# Patient Record
Sex: FEMALE | Race: BLACK OR AFRICAN AMERICAN | Employment: PART TIME | URBAN - METROPOLITAN AREA
[De-identification: names, ages, dates, MRNs, and addresses within clinical notes are randomized per-mention and may not be internally consistent; named-entity substitution may affect disease eponyms.]

---

## 2021-02-23 ENCOUNTER — OFFICE VISIT (OUTPATIENT)
Dept: FAMILY MEDICINE CLINIC | Facility: CLINIC | Age: 66
End: 2021-02-23
Payer: COMMERCIAL

## 2021-02-23 VITALS
HEIGHT: 62 IN | BODY MASS INDEX: 33.31 KG/M2 | OXYGEN SATURATION: 99 % | WEIGHT: 181 LBS | DIASTOLIC BLOOD PRESSURE: 82 MMHG | HEART RATE: 80 BPM | SYSTOLIC BLOOD PRESSURE: 130 MMHG

## 2021-02-23 DIAGNOSIS — Z12.31 SCREENING MAMMOGRAM, ENCOUNTER FOR: ICD-10-CM

## 2021-02-23 DIAGNOSIS — E78.5 HYPERLIPIDEMIA, UNSPECIFIED HYPERLIPIDEMIA TYPE: Primary | ICD-10-CM

## 2021-02-23 DIAGNOSIS — I10 ESSENTIAL HYPERTENSION: ICD-10-CM

## 2021-02-23 DIAGNOSIS — Z12.11 COLON CANCER SCREENING: ICD-10-CM

## 2021-02-23 DIAGNOSIS — Z23 NEED FOR VACCINATION: ICD-10-CM

## 2021-02-23 PROCEDURE — 3008F BODY MASS INDEX DOCD: CPT | Performed by: FAMILY MEDICINE

## 2021-02-23 PROCEDURE — 90471 IMMUNIZATION ADMIN: CPT | Performed by: FAMILY MEDICINE

## 2021-02-23 PROCEDURE — 99203 OFFICE O/P NEW LOW 30 MIN: CPT | Performed by: FAMILY MEDICINE

## 2021-02-23 PROCEDURE — 3075F SYST BP GE 130 - 139MM HG: CPT | Performed by: FAMILY MEDICINE

## 2021-02-23 PROCEDURE — 3079F DIAST BP 80-89 MM HG: CPT | Performed by: FAMILY MEDICINE

## 2021-02-23 PROCEDURE — 90686 IIV4 VACC NO PRSV 0.5 ML IM: CPT | Performed by: FAMILY MEDICINE

## 2021-02-23 RX ORDER — AMLODIPINE BESYLATE 10 MG/1
10 TABLET ORAL DAILY
COMMUNITY
Start: 2021-01-28 | End: 2021-07-09

## 2021-02-23 RX ORDER — METOPROLOL SUCCINATE 50 MG/1
75 TABLET, EXTENDED RELEASE ORAL EVERY EVENING
COMMUNITY
Start: 2021-01-28

## 2021-02-23 RX ORDER — PRAVASTATIN SODIUM 40 MG
40 TABLET ORAL NIGHTLY
Qty: 90 TABLET | Refills: 1 | Status: SHIPPED | OUTPATIENT
Start: 2021-02-23 | End: 2021-10-26

## 2021-02-23 RX ORDER — HYDROCHLOROTHIAZIDE 25 MG/1
25 TABLET ORAL DAILY
COMMUNITY
Start: 2020-10-05 | End: 2021-04-22

## 2021-02-23 RX ORDER — ASPIRIN 81 MG/1
TABLET, CHEWABLE ORAL DAILY
COMMUNITY
End: 2021-11-17 | Stop reason: ALTCHOICE

## 2021-02-23 RX ORDER — PRAVASTATIN SODIUM 40 MG
40 TABLET ORAL NIGHTLY
COMMUNITY
End: 2021-02-23

## 2021-02-23 NOTE — PATIENT INSTRUCTIONS
Controlling Your Cholesterol  Cholesterol is a waxy substance. It travels in your blood through the blood vessels. When you have high cholesterol, it can build up along the walls of the blood vessels.  This makes the vessels narrower and decreases blood f · Eat about two, 3.5 ounce servings of non-fried fish such as salmon, herring, sardines or mackerel per week . Most fish contain omega-3 fatty acids. These help lower total blood cholesterol.  Omega-3 fatty acids lowers triglyceride levels, another form of © 0631-9224 [employer]. All rights reserved. This information is not intended as a substitute for professional medical care. Always follow your healthcare professional's instructions.

## 2021-02-23 NOTE — PROGRESS NOTES
CC:  Patient presents with:  Establish Care: would like a flu shot  Other: patient has not been able to get pravastatin filled so has not been taking it for about 4 months.       HPI: 63year old female here to establish care and requesting medication refil Intimate partner violence        Fear of current or ex partner: Not on file        Emotionally abused: Not on file        Physically abused: Not on file        Forced sexual activity: Not on file    Other Topics      Concerns:        Caffeine Concern: Not return for physical, blood work, UA, and EKG for screening     3. Screening mammogram, encounter for    - Due for screening mammogram and order given   - [facility] MARI 2D+3D SCREENING BILAT (CPT=77067/98188); Future    4.  Colon cancer screening    - Due for scr

## 2021-03-02 ENCOUNTER — HOSPITAL ENCOUNTER (OUTPATIENT)
Dept: MAMMOGRAPHY | Age: 66
Discharge: HOME | End: 2021-03-02
Attending: FAMILY MEDICINE
Payer: COMMERCIAL

## 2021-03-02 DIAGNOSIS — Z12.31 SCREENING MAMMOGRAM, ENCOUNTER FOR: ICD-10-CM

## 2021-03-02 PROCEDURE — 77067 SCR MAMMO BI INCL CAD: CPT | Performed by: FAMILY MEDICINE

## 2021-03-02 PROCEDURE — 77063 BREAST TOMOSYNTHESIS BI: CPT | Performed by: FAMILY MEDICINE

## 2021-03-12 DIAGNOSIS — Z23 NEED FOR VACCINATION: ICD-10-CM

## 2021-04-08 ENCOUNTER — TELEPHONE (OUTPATIENT)
Dept: FAMILY MEDICINE CLINIC | Facility: CLINIC | Age: 66
End: 2021-04-08

## 2021-04-08 DIAGNOSIS — R92.8 ABNORMAL MAMMOGRAM OF LEFT BREAST: Primary | ICD-10-CM

## 2021-04-08 NOTE — TELEPHONE ENCOUNTER
Pt called to say she received a letter asking her to schedule another mammo. Pt states when she called to schedule, she was told she needed an order. Pt would like to know if she does need to retake the mammo?

## 2021-04-08 NOTE — TELEPHONE ENCOUNTER
Reviewed chart and :    RECOMMENDATIONS:   ADDITIONAL MAMMOGRAPHIC VIEWS REQUIRED: LEFT BREAST  --We will call the patient back for additional views and issue an addendum report.        ULTRASOUND: LEFT BREAST  --We will call the patient back for an [place]

## 2021-04-13 ENCOUNTER — HOSPITAL ENCOUNTER (OUTPATIENT)
Dept: MAMMOGRAPHY | Facility: HOSPITAL | Age: 66
Discharge: HOME | End: 2021-04-13
Attending: FAMILY MEDICINE
Payer: COMMERCIAL

## 2021-04-13 DIAGNOSIS — R92.8 ABNORMAL MAMMOGRAM OF LEFT BREAST: ICD-10-CM

## 2021-04-13 PROCEDURE — 77061 BREAST TOMOSYNTHESIS UNI: CPT | Performed by: FAMILY MEDICINE

## 2021-04-13 PROCEDURE — 77065 DX MAMMO INCL CAD UNI: CPT | Performed by: FAMILY MEDICINE

## 2021-04-22 ENCOUNTER — OFFICE VISIT (OUTPATIENT)
Dept: FAMILY MEDICINE CLINIC | Facility: CLINIC | Age: 66
End: 2021-04-22
Payer: COMMERCIAL

## 2021-04-22 ENCOUNTER — HOSPITAL ENCOUNTER (OUTPATIENT)
Dept: GENERAL RADIOLOGY | Age: 66
Discharge: HOME | End: 2021-04-22
Attending: FAMILY MEDICINE
Payer: COMMERCIAL

## 2021-04-22 ENCOUNTER — LAB ENCOUNTER (OUTPATIENT)
Dept: LAB | Facility: REFERENCE LAB | Age: 66
End: 2021-04-22
Attending: FAMILY MEDICINE
Payer: COMMERCIAL

## 2021-04-22 ENCOUNTER — EKG ENCOUNTER (OUTPATIENT)
Dept: LAB | Age: 66
End: 2021-04-22
Attending: FAMILY MEDICINE
Payer: COMMERCIAL

## 2021-04-22 VITALS
DIASTOLIC BLOOD PRESSURE: 84 MMHG | HEIGHT: 62 IN | BODY MASS INDEX: 33.31 KG/M2 | HEART RATE: 64 BPM | SYSTOLIC BLOOD PRESSURE: 128 MMHG | OXYGEN SATURATION: 97 % | WEIGHT: 181 LBS

## 2021-04-22 DIAGNOSIS — Z00.00 ENCOUNTER FOR ROUTINE ADULT HEALTH EXAMINATION WITHOUT ABNORMAL FINDINGS: ICD-10-CM

## 2021-04-22 DIAGNOSIS — I10 ESSENTIAL HYPERTENSION: ICD-10-CM

## 2021-04-22 DIAGNOSIS — Z00.00 ENCOUNTER FOR ROUTINE ADULT HEALTH EXAMINATION WITHOUT ABNORMAL FINDINGS: Primary | ICD-10-CM

## 2021-04-22 DIAGNOSIS — R76.11 POSITIVE TB TEST: ICD-10-CM

## 2021-04-22 DIAGNOSIS — Z12.4 PAP SMEAR FOR CERVICAL CANCER SCREENING: ICD-10-CM

## 2021-04-22 PROBLEM — R73.03 PREDIABETES: Status: ACTIVE | Noted: 2021-04-22

## 2021-04-22 PROCEDURE — 93010 ELECTROCARDIOGRAM REPORT: CPT | Performed by: FAMILY MEDICINE

## 2021-04-22 PROCEDURE — 85025 COMPLETE CBC W/AUTO DIFF WBC: CPT

## 2021-04-22 PROCEDURE — 82043 UR ALBUMIN QUANTITATIVE: CPT | Performed by: FAMILY MEDICINE

## 2021-04-22 PROCEDURE — 83036 HEMOGLOBIN GLYCOSYLATED A1C: CPT

## 2021-04-22 PROCEDURE — 99396 PREV VISIT EST AGE 40-64: CPT | Performed by: FAMILY MEDICINE

## 2021-04-22 PROCEDURE — 80053 COMPREHEN METABOLIC PANEL: CPT

## 2021-04-22 PROCEDURE — 71046 X-RAY EXAM CHEST 2 VIEWS: CPT | Performed by: FAMILY MEDICINE

## 2021-04-22 PROCEDURE — 80061 LIPID PANEL: CPT

## 2021-04-22 PROCEDURE — 87624 HPV HI-RISK TYP POOLED RSLT: CPT | Performed by: FAMILY MEDICINE

## 2021-04-22 PROCEDURE — 93005 ELECTROCARDIOGRAM TRACING: CPT

## 2021-04-22 PROCEDURE — 3008F BODY MASS INDEX DOCD: CPT | Performed by: FAMILY MEDICINE

## 2021-04-22 PROCEDURE — 36415 COLL VENOUS BLD VENIPUNCTURE: CPT

## 2021-04-22 PROCEDURE — 3079F DIAST BP 80-89 MM HG: CPT | Performed by: FAMILY MEDICINE

## 2021-04-22 PROCEDURE — 3074F SYST BP LT 130 MM HG: CPT | Performed by: FAMILY MEDICINE

## 2021-04-22 PROCEDURE — 82570 ASSAY OF URINE CREATININE: CPT | Performed by: FAMILY MEDICINE

## 2021-04-22 RX ORDER — HYDROCHLOROTHIAZIDE 25 MG/1
25 TABLET ORAL DAILY
Qty: 90 TABLET | Refills: 1 | Status: SHIPPED | OUTPATIENT
Start: 2021-04-22 | End: 2021-10-26

## 2021-04-22 NOTE — PROGRESS NOTES
1HPI:   Gricelda Sandhu is a 61year old female who presents for a complete physical exam.     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      Last pap: 2015 and normal, no history of abnormal pap smears   L SWELLING   Past Medical History:   Diagnosis Date   • Essential hypertension    • Hyperlipidemia       Past Surgical History:   Procedure Laterality Date   • BREAST SURGERY      Benign breast cyst   • AIMEE LOCALIZATION WIRE 1 SITE LEFT (CPT=19281) [E]      Hpv High Risk , Thin Prep Collection      Lipid Panel [E]      Hemoglobin A1C (Glycohemoglobin) [E]      CBC W Differential W Platelet [E]      Comp Metabolic Panel (14) [E]      HCV Antibody [E]      ThinPrep PAP Smear [E]    Chest x-ray ordered MG Oral Tab 90 tablet 1     Sig: Take 1 tablet (25 mg total) by mouth daily.        Imaging & Consults:  XR CHEST PA + LAT CHEST (CPT=71046)  EKG 12-LEAD    Maryfrances Halsted, DO  4/22/2021  1:36 PM

## 2021-04-23 ENCOUNTER — TELEPHONE (OUTPATIENT)
Dept: FAMILY MEDICINE CLINIC | Facility: CLINIC | Age: 66
End: 2021-04-23

## 2021-04-23 NOTE — TELEPHONE ENCOUNTER
Called patient after reviewing colonoscopy report in [facility] from 9/2012 showing cancerous polyp that was removed with recommendation to repeat CLN in 3 months.  She states she did do a follow-up 12/2012 and has a copy of the report but does not remember th

## 2021-04-26 ENCOUNTER — TELEPHONE (OUTPATIENT)
Dept: FAMILY MEDICINE CLINIC | Facility: CLINIC | Age: 66
End: 2021-04-26

## 2021-04-26 DIAGNOSIS — Z12.11 COLON CANCER SCREENING: Primary | ICD-10-CM

## 2021-04-27 ENCOUNTER — TELEPHONE (OUTPATIENT)
Dept: FAMILY MEDICINE CLINIC | Facility: CLINIC | Age: 66
End: 2021-04-27

## 2021-04-27 NOTE — TELEPHONE ENCOUNTER
PT made an appt to have her colonoscopy-  Scheduled for 5.27.21  PT said there is no need to call back

## 2021-05-26 NOTE — H&P
[facility], [facility] - Gastroenterology                                                                                                               Reason for consult: c Social History: Social History    Tobacco Use      Smoking status: Never Smoker      Smokeless tobacco: Never Used    Vaping Use      Vaping Use: Never used    Alcohol use: Never    Drug use: Never       Medications (Active prior to today's visit):  Curr Collection Time: 04/22/21  2:17 PM   Result Value Ref Range    WBC 6.9 4.0 - 11.0 x10(3) uL    RBC 4.11 3.80 - 5.30 x10(6)uL    HGB 12.3 12.0 - 16.0 g/dL    HCT 38.1 35.0 - 48.0 %    MCV 92.7 80.0 - 100.0 fL    MCH 29.9 26.0 - 34.0 pg    MCHC 32.3 31.0 - 3 encounters for the requested time period, some results have not been displayed. A complete set of results can be found in Results Review.         ASSESSMENT/PLAN:   Jethro Lopez is a 61year old year-old female with history of htn, hld:    #constipation this.       >>>Please note: if you were prescribed Suprep for the bowel prep and it is too expensive or not covered by insurance, it is okay to substitute Trilyte (or any similar generic prep).  This can be done by notifying the pharmacy or calling our offi

## 2021-05-27 ENCOUNTER — TELEPHONE (OUTPATIENT)
Dept: GASTROENTEROLOGY | Facility: CLINIC | Age: 66
End: 2021-05-27

## 2021-05-27 ENCOUNTER — OFFICE VISIT (OUTPATIENT)
Dept: GASTROENTEROLOGY | Facility: CLINIC | Age: 66
End: 2021-05-27
Payer: COMMERCIAL

## 2021-05-27 VITALS — HEART RATE: 60 BPM | DIASTOLIC BLOOD PRESSURE: 81 MMHG | TEMPERATURE: 98 F | SYSTOLIC BLOOD PRESSURE: 155 MMHG

## 2021-05-27 DIAGNOSIS — K59.00 CONSTIPATION, UNSPECIFIED CONSTIPATION TYPE: ICD-10-CM

## 2021-05-27 DIAGNOSIS — K21.9 GASTROESOPHAGEAL REFLUX DISEASE, UNSPECIFIED WHETHER ESOPHAGITIS PRESENT: ICD-10-CM

## 2021-05-27 DIAGNOSIS — Z86.010 PERSONAL HISTORY OF COLONIC POLYPS: ICD-10-CM

## 2021-05-27 DIAGNOSIS — Z86.010 PERSONAL HISTORY OF COLONIC POLYPS: Primary | ICD-10-CM

## 2021-05-27 DIAGNOSIS — K21.9 GASTROESOPHAGEAL REFLUX DISEASE WITHOUT ESOPHAGITIS: ICD-10-CM

## 2021-05-27 DIAGNOSIS — Z12.11 COLON CANCER SCREENING: Primary | ICD-10-CM

## 2021-05-27 DIAGNOSIS — Z12.11 COLON CANCER SCREENING: ICD-10-CM

## 2021-05-27 PROCEDURE — 3077F SYST BP >= 140 MM HG: CPT | Performed by: NURSE PRACTITIONER

## 2021-05-27 PROCEDURE — 99244 OFF/OP CNSLTJ NEW/EST MOD 40: CPT | Performed by: NURSE PRACTITIONER

## 2021-05-27 PROCEDURE — 3079F DIAST BP 80-89 MM HG: CPT | Performed by: NURSE PRACTITIONER

## 2021-05-27 NOTE — TELEPHONE ENCOUNTER
Scheduled for:  Colonoscopy 46288  Provider Name:  Dr. Marya Noe  Date:  8/10/21  Location:  [facility]  Sedation:  MAC  Time:  11:15am (pt is aware to arrive at 10:15am)  Prep:  Miralax/Gatorade  Meds/Allergies Reconciled?:  Emily/NP reviewed.    Diagnosis with co

## 2021-05-27 NOTE — PATIENT INSTRUCTIONS
-miralax  -pepcid as needed for reflux  -reflux diet modification  -request cln from [facility]  -continue to monitor for need for EGD  1. Schedule colonoscopy with MAC w/ Dr. Cachorro Prather [Diagnosis: h/o cln polyps, constipation]    2.   bowel prep from pharmacy Limit aspirin and ibuprofen  · Stop smoking     Efren last reviewed this educational content on 6/1/2019  © 7904-0928 [employer] [address]. [address] [facility], [address] [place] [address]. All rights reserved.  This information is not intended as a substi

## 2021-07-09 RX ORDER — AMLODIPINE BESYLATE 10 MG/1
TABLET ORAL
Qty: 90 TABLET | Refills: 0 | Status: SHIPPED | OUTPATIENT
Start: 2021-07-09 | End: 2021-10-26

## 2021-07-12 ENCOUNTER — TELEPHONE (OUTPATIENT)
Dept: GASTROENTEROLOGY | Facility: CLINIC | Age: 66
End: 2021-07-12

## 2021-07-26 NOTE — TELEPHONE ENCOUNTER
Avni Buchanan-    Received fax return from [facility] records. No colonoscopy and/or GI records available for patient.      Thank you

## 2021-08-09 ENCOUNTER — TELEPHONE (OUTPATIENT)
Dept: GASTROENTEROLOGY | Facility: CLINIC | Age: 66
End: 2021-08-09

## 2021-08-09 NOTE — TELEPHONE ENCOUNTER
Contacted patient and reviewed prep instructions. Patient had question regarding miralax. I reviewed instructions, patient verbalized understanding.

## 2021-08-09 NOTE — TELEPHONE ENCOUNTER
Pt has procedure tomorrow and needs clarification on how much Miralax she is supposed to take.  Please call

## 2021-08-10 ENCOUNTER — ANESTHESIA EVENT (OUTPATIENT)
Dept: ENDOSCOPY | Facility: HOSPITAL | Age: 66
End: 2021-08-10
Payer: COMMERCIAL

## 2021-08-10 ENCOUNTER — HOSPITAL ENCOUNTER (OUTPATIENT)
Facility: HOSPITAL | Age: 66
Setting detail: HOSPITAL OUTPATIENT SURGERY
Discharge: HOME | End: 2021-08-10
Attending: INTERNAL MEDICINE | Admitting: INTERNAL MEDICINE
Payer: COMMERCIAL

## 2021-08-10 ENCOUNTER — ANESTHESIA (OUTPATIENT)
Dept: ENDOSCOPY | Facility: HOSPITAL | Age: 66
End: 2021-08-10
Payer: COMMERCIAL

## 2021-08-10 VITALS
BODY MASS INDEX: 30.36 KG/M2 | TEMPERATURE: 97 F | WEIGHT: 165 LBS | OXYGEN SATURATION: 100 % | DIASTOLIC BLOOD PRESSURE: 70 MMHG | SYSTOLIC BLOOD PRESSURE: 91 MMHG | RESPIRATION RATE: 16 BRPM | HEART RATE: 64 BPM | HEIGHT: 62 IN

## 2021-08-10 DIAGNOSIS — K59.00 CONSTIPATION, UNSPECIFIED CONSTIPATION TYPE: ICD-10-CM

## 2021-08-10 DIAGNOSIS — Z12.11 COLON CANCER SCREENING: ICD-10-CM

## 2021-08-10 DIAGNOSIS — Z86.010 PERSONAL HISTORY OF COLONIC POLYPS: ICD-10-CM

## 2021-08-10 DIAGNOSIS — K21.9 GASTROESOPHAGEAL REFLUX DISEASE WITHOUT ESOPHAGITIS: ICD-10-CM

## 2021-08-10 PROCEDURE — 0DBM8ZX EXCISION OF DESCENDING COLON, VIA NATURAL OR ARTIFICIAL OPENING ENDOSCOPIC, DIAGNOSTIC: ICD-10-PCS | Performed by: INTERNAL MEDICINE

## 2021-08-10 PROCEDURE — 45385 COLONOSCOPY W/LESION REMOVAL: CPT | Performed by: INTERNAL MEDICINE

## 2021-08-10 PROCEDURE — 45381 COLONOSCOPY SUBMUCOUS NJX: CPT | Performed by: INTERNAL MEDICINE

## 2021-08-10 PROCEDURE — 0DBN8ZX EXCISION OF SIGMOID COLON, VIA NATURAL OR ARTIFICIAL OPENING ENDOSCOPIC, DIAGNOSTIC: ICD-10-PCS | Performed by: INTERNAL MEDICINE

## 2021-08-10 PROCEDURE — 0DBP8ZX EXCISION OF RECTUM, VIA NATURAL OR ARTIFICIAL OPENING ENDOSCOPIC, DIAGNOSTIC: ICD-10-PCS | Performed by: INTERNAL MEDICINE

## 2021-08-10 PROCEDURE — 45380 COLONOSCOPY AND BIOPSY: CPT | Performed by: INTERNAL MEDICINE

## 2021-08-10 RX ORDER — SODIUM CHLORIDE, SODIUM LACTATE, POTASSIUM CHLORIDE, CALCIUM CHLORIDE 600; 310; 30; 20 MG/100ML; MG/100ML; MG/100ML; MG/100ML
INJECTION, SOLUTION INTRAVENOUS CONTINUOUS
Status: DISCONTINUED | OUTPATIENT
Start: 2021-08-10 | End: 2021-08-10

## 2021-08-10 RX ORDER — NALOXONE HYDROCHLORIDE 0.4 MG/ML
80 INJECTION, SOLUTION INTRAMUSCULAR; INTRAVENOUS; SUBCUTANEOUS AS NEEDED
Status: DISCONTINUED | OUTPATIENT
Start: 2021-08-10 | End: 2021-08-10

## 2021-08-10 RX ORDER — LIDOCAINE HYDROCHLORIDE 10 MG/ML
INJECTION, SOLUTION EPIDURAL; INFILTRATION; INTRACAUDAL; PERINEURAL AS NEEDED
Status: DISCONTINUED | OUTPATIENT
Start: 2021-08-10 | End: 2021-08-10 | Stop reason: SURG

## 2021-08-10 RX ADMIN — LIDOCAINE HYDROCHLORIDE 50 MG: 10 INJECTION, SOLUTION EPIDURAL; INFILTRATION; INTRACAUDAL; PERINEURAL at 11:05:00

## 2021-08-10 RX ADMIN — SODIUM CHLORIDE, SODIUM LACTATE, POTASSIUM CHLORIDE, CALCIUM CHLORIDE: 600; 310; 30; 20 INJECTION, SOLUTION INTRAVENOUS at 11:42:00

## 2021-08-10 RX ADMIN — SODIUM CHLORIDE, SODIUM LACTATE, POTASSIUM CHLORIDE, CALCIUM CHLORIDE: 600; 310; 30; 20 INJECTION, SOLUTION INTRAVENOUS at 11:01:00

## 2021-08-10 NOTE — OPERATIVE REPORT
COLONOSCOPY REPORT    Indira Doll 3/30/1955 Age 61year old   PCP Serena Ross DO Endoscopist Frank Agarwal MD     Date of procedure: 08/10/21    Procedure: Colonoscopy w/cold snare polypectomy, cold biopsy and submucosal injectio from entry, a large, soft polypoid mass was noted adjacent to a prior SPOT marking. The polypoid mass is occupying about 50% of the luminal space and appears to be broad based and disha-circumferential to some degree (estimated to be 5x6 cm in size).  The gar obtained and you have not received your pathology results either by phone or letter within 2 weeks, please call our office at [phone].     Specimens: colon     Blood loss: <1 ml

## 2021-08-10 NOTE — PROGRESS NOTES
Brief note:    I was informed patient had a fall and I went to see patient. SHe is sitting comfortably in the chair. Aline Forrest on her R side when she was putting on socks. Denies pain. Denies hitting her head. SPeaking normally.  No pain to palpation on R side,

## 2021-08-10 NOTE — PROGRESS NOTES
Pt fell while getting dressed. Pt got herself up. Denies pain. Seen by Dr. Ana Luisa Olea. Ok to be discharged.

## 2021-08-10 NOTE — ANESTHESIA PREPROCEDURE EVALUATION
Anesthesia PreOp Note    HPI:     Wellington Gomez is a 61year old female who presents for preoperative consultation requested by: Faustino Vega MD    Date of Surgery: 8/10/2021    Procedure(s):  COLONOSCOPY  Indication: Colon cancer screening, P Hypertension Father    • Diabetes Sister    • Diabetes Brother    • Diabetes Sister      Social History    Socioeconomic History      Marital status:       Spouse name: Not on file      Number of children: Not on file      Years of education: Not on visit.      Anesthesia Evaluation     Patient summary reviewed and Nursing notes reviewed    No history of anesthetic complications   Airway   Mallampati: II  TM distance: >3 FB  Neck ROM: full  Dental - normal exam     Pulmonary - negative ROS and normal e

## 2021-08-10 NOTE — ANESTHESIA POSTPROCEDURE EVALUATION
Patient: Lupe Padilla    Procedure Summary     Date: 08/10/21 Room / Location: [address] [place] [address] ENDOSCOPY 01 / [address] [place] [address] ENDOSCOPY    Anesthesia Start: 1101 Anesthesia Stop: 2076    Procedure: COLONOSCOPY (N/A ) Diagnosis:       Colon cancer screening      Personal

## 2021-08-10 NOTE — H&P
History & Physical Examination    Patient Name: Carter Wills  MRN: [id number]  [facility]: [id number]  YOB: 1955    Diagnosis: screening for colon cancer    AMLODIPINE BESYLATE 10 MG Oral Tab, TAKE 1 TABLET BY MOUTH DAILY, Disp: 90 tablet, Tory Ott MD  [facility] - Gastroenterology  8/10/2021  11:01 AM

## 2021-08-11 ENCOUNTER — TELEPHONE (OUTPATIENT)
Dept: GASTROENTEROLOGY | Facility: CLINIC | Age: 66
End: 2021-08-11

## 2021-08-16 ENCOUNTER — TELEPHONE (OUTPATIENT)
Dept: GASTROENTEROLOGY | Facility: CLINIC | Age: 66
End: 2021-08-16

## 2021-08-16 DIAGNOSIS — K63.89 COLONIC MASS: Primary | ICD-10-CM

## 2021-08-16 RX ORDER — PREDNISONE 50 MG/1
TABLET ORAL
Qty: 3 TABLET | Refills: 0 | Status: SHIPPED | OUTPATIENT
Start: 2021-08-16 | End: 2021-11-17

## 2021-08-16 NOTE — TELEPHONE ENCOUNTER
Late entry note:    I discussed the pathology results from c-scope showing no malignancy. Possibly large polyp. However will need to proceed with CT A/P to exclude LAD.     She remembers having HIVES during prior contrast, so will pre-medicate with Steroids

## 2021-08-17 NOTE — TELEPHONE ENCOUNTER
Contacted patient and reviewed medication instructions, phone number provided to patient for central scheduling. Patient verbalized understanding. Sent patient rankur message with instructions.

## 2021-09-08 ENCOUNTER — TELEPHONE (OUTPATIENT)
Dept: CASE MANAGEMENT | Age: 66
End: 2021-09-08

## 2021-09-08 NOTE — TELEPHONE ENCOUNTER
[facility] and spoke to [facility]. She took down the information and will call back with date/time for peer to peer.     Please transfer to RN

## 2021-09-08 NOTE — TELEPHONE ENCOUNTER
GI Clinical Staff:   Can you please set up a peer to peer, thank you.  Monday's after 4pm work best. thanks

## 2021-09-08 NOTE — TELEPHONE ENCOUNTER
Dr. Reina Encarnacion,    I contacted [address] [place] [address] and spoke to [facility]. They do not have any availability after 4pm for any day.  They have 30 minute blocks open the latest would be 3pm-3:30pm and earliest 9:30am.     Please provide additional dates/times of availabili

## 2021-09-08 NOTE — TELEPHONE ENCOUNTER
HI Dr. Olesya Hunter,    The CT you ordered for Vipin Matias has been denied by her insurance. Please f/u with patient for her future plan of care.     Thank you  Yanet Beltran

## 2021-09-10 NOTE — TELEPHONE ENCOUNTER
Contacted patient and informed her insurance did authorize CT A/P.      Patient will call central scheduling to reschedule test.

## 2021-09-10 NOTE — TELEPHONE ENCOUNTER
Peer to peer done yesterday:    Authorization number [id number]- for 3 months    Please have patient proceed with CT A/P ASAP.

## 2021-09-20 ENCOUNTER — TELEPHONE (OUTPATIENT)
Dept: GASTROENTEROLOGY | Facility: CLINIC | Age: 66
End: 2021-09-20

## 2021-09-29 ENCOUNTER — TELEPHONE (OUTPATIENT)
Dept: GASTROENTEROLOGY | Facility: CLINIC | Age: 66
End: 2021-09-29

## 2021-09-29 NOTE — TELEPHONE ENCOUNTER
Contacted patient and reviewed prednisone and diphenhydramine instructions to be taken prior to Ct exam.     Patient verbalized understanding. Sent again via [address]

## 2021-09-29 NOTE — TELEPHONE ENCOUNTER
Pt has question about the upcoming CT scan, she is wondering if she needs to stop taking any medication prior to the CT scan.  Please follow up

## 2021-10-01 ENCOUNTER — HOSPITAL ENCOUNTER (OUTPATIENT)
Dept: CT IMAGING | Facility: HOSPITAL | Age: 66
Discharge: HOME | End: 2021-10-01
Attending: INTERNAL MEDICINE
Payer: COMMERCIAL

## 2021-10-01 ENCOUNTER — TELEPHONE (OUTPATIENT)
Dept: GASTROENTEROLOGY | Facility: CLINIC | Age: 66
End: 2021-10-01

## 2021-10-01 DIAGNOSIS — K63.89 COLONIC MASS: ICD-10-CM

## 2021-10-01 DIAGNOSIS — N28.89 RIGHT RENAL MASS: Primary | ICD-10-CM

## 2021-10-01 PROCEDURE — 82565 ASSAY OF CREATININE: CPT

## 2021-10-01 PROCEDURE — 74177 CT ABD & PELVIS W/CONTRAST: CPT | Performed by: INTERNAL MEDICINE

## 2021-10-01 NOTE — TELEPHONE ENCOUNTER
D/w patient re: CT A/P. I asked her to go for CT due to a very large sigmoid polyp, which on biopsies show no malignancy but I wanted to confirm no invasive features in the sigmoid on CT. The CT shows no obvious evidence of colon cancer.  However there i

## 2021-10-02 NOTE — TELEPHONE ENCOUNTER
Please notify patient I have referred her to Dr. Irving Phipps from urology for further evaluation of right renal mass found on CT scan.  She is aware of need to see someone ASAP and please make sure she calls back if having a hard time getting a visit in a lizet

## 2021-10-04 NOTE — TELEPHONE ENCOUNTER
Called pt and provided Dr. Ricki Doe instructions including calling insurance to expedite authorization. This RN [facility] with [facility]. Pt verbalized understanding.

## 2021-10-04 NOTE — TELEPHONE ENCOUNTER
Message from [address] [place] [address]:  Good Morning Tania Pinos - Patient has [employer]. so no authorization is needed for an office visit. Patient just needs to confirm that the provider accepts her insurnace.  Thanks    Called pt already has appointment and did provide to She will need repeat DXA scan in future  Continue vitamin D supplementation

## 2021-10-05 ENCOUNTER — OFFICE VISIT (OUTPATIENT)
Dept: SURGERY | Facility: CLINIC | Age: 66
End: 2021-10-05
Payer: COMMERCIAL

## 2021-10-05 VITALS
WEIGHT: 165 LBS | SYSTOLIC BLOOD PRESSURE: 132 MMHG | RESPIRATION RATE: 16 BRPM | HEART RATE: 65 BPM | HEIGHT: 62 IN | BODY MASS INDEX: 30.36 KG/M2 | DIASTOLIC BLOOD PRESSURE: 70 MMHG

## 2021-10-05 DIAGNOSIS — D25.9 UTERINE LEIOMYOMA, UNSPECIFIED LOCATION: ICD-10-CM

## 2021-10-05 DIAGNOSIS — R35.1 NOCTURIA: ICD-10-CM

## 2021-10-05 DIAGNOSIS — N28.89 RIGHT RENAL MASS: Primary | ICD-10-CM

## 2021-10-05 DIAGNOSIS — K80.20 CALCULUS OF GALLBLADDER WITHOUT CHOLECYSTITIS WITHOUT OBSTRUCTION: ICD-10-CM

## 2021-10-05 DIAGNOSIS — Z79.82 LONG TERM CURRENT USE OF ASPIRIN: ICD-10-CM

## 2021-10-05 PROCEDURE — 3075F SYST BP GE 130 - 139MM HG: CPT | Performed by: UROLOGY

## 2021-10-05 PROCEDURE — 3078F DIAST BP <80 MM HG: CPT | Performed by: UROLOGY

## 2021-10-05 PROCEDURE — 3008F BODY MASS INDEX DOCD: CPT | Performed by: UROLOGY

## 2021-10-05 PROCEDURE — 99244 OFF/OP CNSLTJ NEW/EST MOD 40: CPT | Performed by: UROLOGY

## 2021-10-05 NOTE — PROGRESS NOTES
Vick Mata is a 61year old female. Reason for Consultation:       History provided by patient.  Referred by Dr. Janna Ascencio, PCP      History of Present Illness:       Renal Mass  Incidental finding on 10/01/2021 CT abdomen + pelvis =\" Suspicious blood pressure    • High cholesterol       Past Surgical History:   Procedure Laterality Date   • BREAST SURGERY      Benign breast cyst   • COLONOSCOPY N/A 8/10/2021    Procedure: COLONOSCOPY;  Surgeon: Liseth Warner MD;  Location: [facility] ENDOSCOPY   • MA activity, polydipsia and polyphagia; Positive for hot flashes     Allergic/Immuno:  Negative for environmental allergies;  Positive for food allergies   Cardiovascular:  Negative for cool extremity and irregular heartbeat/palpitations  Constitutional:  Nega heterogeneously enhancing 4.4 cm exophytic right upper pole renal mass;   No suspicious  retroperitoneal lymphadenopathy or renal vein invasion/thrombosis; no pelvic lymphadenopathy; no significant bony lesion or fracture; multi fibroid uterus; cholelithias taking any medication for this. She feels this is stable and chooses to continue observation.  Patient will submit a urine specimen for UA tomorrow morning.     (Z79.82) Long term current use of aspirin  Patient is on aspirin 81 mg for general health  and a Prescriptions      No prescriptions requested or ordered in this encounter       Imaging & Referrals:  CT CHEST (CPT=71250)     By signing my name below, I, Pascale Backers,  attest that this documentation has been prepared under the direction and in the pre

## 2021-10-05 NOTE — PATIENT INSTRUCTIONS
Sohan Rai M.D.      1.  The right kidney has a 4.4 cm growth (approximately      1   3/4  of an inch ) on CT of 10/1/2021; no evidence of spread outside the kidney. This  is more likely than not malignant.

## 2021-10-06 ENCOUNTER — OFFICE VISIT (OUTPATIENT)
Dept: SURGERY | Facility: CLINIC | Age: 66
End: 2021-10-06
Payer: COMMERCIAL

## 2021-10-06 ENCOUNTER — TELEPHONE (OUTPATIENT)
Dept: SURGERY | Facility: CLINIC | Age: 66
End: 2021-10-06

## 2021-10-06 ENCOUNTER — LAB ENCOUNTER (OUTPATIENT)
Dept: LAB | Facility: HOSPITAL | Age: 66
End: 2021-10-06
Attending: UROLOGY
Payer: COMMERCIAL

## 2021-10-06 VITALS
SYSTOLIC BLOOD PRESSURE: 171 MMHG | WEIGHT: 178 LBS | BODY MASS INDEX: 32.76 KG/M2 | HEART RATE: 65 BPM | HEIGHT: 62 IN | DIASTOLIC BLOOD PRESSURE: 64 MMHG

## 2021-10-06 DIAGNOSIS — R35.1 NOCTURIA: ICD-10-CM

## 2021-10-06 DIAGNOSIS — N28.89 RIGHT RENAL MASS: Primary | ICD-10-CM

## 2021-10-06 DIAGNOSIS — Z51.81 MONITORING FOR ANTICOAGULANT USE: ICD-10-CM

## 2021-10-06 DIAGNOSIS — N28.89 RIGHT RENAL MASS: ICD-10-CM

## 2021-10-06 DIAGNOSIS — E87.6 HYPOKALEMIA: Primary | ICD-10-CM

## 2021-10-06 DIAGNOSIS — Z79.01 MONITORING FOR ANTICOAGULANT USE: ICD-10-CM

## 2021-10-06 PROCEDURE — 3008F BODY MASS INDEX DOCD: CPT | Performed by: UROLOGY

## 2021-10-06 PROCEDURE — 3077F SYST BP >= 140 MM HG: CPT | Performed by: UROLOGY

## 2021-10-06 PROCEDURE — 87086 URINE CULTURE/COLONY COUNT: CPT | Performed by: UROLOGY

## 2021-10-06 PROCEDURE — 81001 URINALYSIS AUTO W/SCOPE: CPT

## 2021-10-06 PROCEDURE — 99215 OFFICE O/P EST HI 40 MIN: CPT | Performed by: UROLOGY

## 2021-10-06 PROCEDURE — 80053 COMPREHEN METABOLIC PANEL: CPT

## 2021-10-06 PROCEDURE — 85025 COMPLETE CBC W/AUTO DIFF WBC: CPT

## 2021-10-06 PROCEDURE — 36415 COLL VENOUS BLD VENIPUNCTURE: CPT

## 2021-10-06 PROCEDURE — 3078F DIAST BP <80 MM HG: CPT | Performed by: UROLOGY

## 2021-10-06 RX ORDER — POTASSIUM CHLORIDE 20 MEQ/1
TABLET, EXTENDED RELEASE ORAL
Qty: 180 TABLET | Refills: 0 | Status: SHIPPED | OUTPATIENT
Start: 2021-10-06 | End: 2021-11-17

## 2021-10-06 RX ORDER — DIPHENHYDRAMINE HCL 50 MG/1
CAPSULE ORAL
COMMUNITY
Start: 2021-08-16 | End: 2021-11-17

## 2021-10-06 NOTE — TELEPHONE ENCOUNTER
Called patient regarding low potassium result. Has been having more muscle cramps in her feet and legs but no other symptoms.  Has been on hydrochlorothiazide for several years and does not recall low potassium in the past. Will start KCl 20 meq x 4 doses t

## 2021-10-06 NOTE — PROGRESS NOTES
Urology staff,  Please call this patient on urgent basis and notify her that her blood potassium level was 2.9, and that is abnormally low; patient needs to call Dr. Kimberly Pemberton office; I sent Dr. Tim Nolasco a detailed message. Also please see below.   Thank you,

## 2021-10-06 NOTE — TELEPHONE ENCOUNTER
----- Message from Usman Jennings MD sent at 10/6/2021  4:28 PM CDT -----  Urology staff,  Please call this patient on urgent basis and notify her that her blood potassium level was 2.9, and that is abnormally low; patient needs to call Dr. Yobany Willis offic

## 2021-10-06 NOTE — H&P
[facility], [facility] Urologic Oncology  Initial Office Consultation    HPI:   Sophia Mills is a 61year old female here today for consultation at the request of, and a copy of this note will be sent to, Darvin Ahumada, DO, and Marvin Gandara MD.     1 Patient Position: Sitting, Cuff Size: adult)   Pulse 65   Ht 5' 2\" (1.575 m)   Wt 178 lb (80.7 kg)   BMI 32.56 kg/m²     Physical Exam  Vitals reviewed. Constitutional:       General: She is not in acute distress. Appearance: She is well-developed. recommended. 3. Small retrocardiac hiatal hernia. 4. Cholelithiasis. 5. There is a multi fibroid uterus. IMPRESSION:  61year old -American female with an incidental 4.4 cm solid enhancing right renal mass (clinical stage T1b Nx Mx).     After incidentally detected renal masses may have a slow growing course.   The limitations of these data and their uncertainty were stressed and the patient understands that observation is a calculated risk and that even small, closely followed renal masses may m therapy) given their history. The expected hospital and post-operative courses were reviewed.       Regarding nephron sparing surgery or partial nephrectomy, the rationale for this approach was outlined in detail including the way this operation is perform proactive in learning about renal masses and the treatment risks and benefits. They understand that the decision as to which approach to take is one made based on the medical risks and benefits as well as their own informed tolerance of this risk.  They wer

## 2021-10-06 NOTE — TELEPHONE ENCOUNTER
-S/w pt; she correctly verifies identity with name & .  -I read message from [address] as stated below.  -Pt had to pull over from driving & assures me she will call Dr. Rajan Holcomb office as directed by [facility].   -We reviewed potassium rich foods listed in K messag

## 2021-10-06 NOTE — TELEPHONE ENCOUNTER
Doctor Ar Avila,  As part of work-up for suspected renal malignancy, patient underwent a blood draw for CMP today and unexpectedly the serum potassium is 2.9. I am asking patient to call your office for further work-up and treatment of that.   Today the ania

## 2021-10-06 NOTE — PROGRESS NOTES
Patient seen in office, scheduled RIGHT ROBOTIC ASSISTED LAPAROSCOPIC PARTIAL NEPHRECTOMY, INTRAOPERATIVE ULTRASOUND,POSSIBLE TOTAL, POSSIBLE OPEN, [facility] went over pre-op instructions, some labs done today, marie

## 2021-10-07 ENCOUNTER — TELEPHONE (OUTPATIENT)
Dept: GASTROENTEROLOGY | Facility: CLINIC | Age: 66
End: 2021-10-07

## 2021-10-07 ENCOUNTER — TELEPHONE (OUTPATIENT)
Dept: SURGERY | Facility: CLINIC | Age: 66
End: 2021-10-07

## 2021-10-07 DIAGNOSIS — Z86.010 HISTORY OF COLON POLYPS: Primary | ICD-10-CM

## 2021-10-07 NOTE — TELEPHONE ENCOUNTER
Patient with large sigmoid laterally spreading polyp (benign on biopsy) that needs removal with endoscopic mucosal resection. I spoke with Dr. Jose Jolley in our group who will attempt to remove polyp.     She does have a CT a/p showing a large R kidney lesio

## 2021-10-07 NOTE — TELEPHONE ENCOUNTER
----- Message from Claire Peacock MD sent at 10/6/2021  4:28 PM CDT -----  Urology staff,  Please call this patient on urgent basis and notify her that her blood potassium level was 2.9, and that is abnormally low; patient needs to call Dr. Lennox Sark offic

## 2021-10-07 NOTE — TELEPHONE ENCOUNTER
I LM for pt about RENATA's results note as stated below and told her that I see that she read RENATA's my chart msg to her also and hope that she is following up with her PCP and following his instructions on diet.  I asked that she c/b if she has any questions o

## 2021-10-08 ENCOUNTER — LAB ENCOUNTER (OUTPATIENT)
Dept: LAB | Facility: REFERENCE LAB | Age: 66
End: 2021-10-08
Attending: FAMILY MEDICINE
Payer: COMMERCIAL

## 2021-10-08 ENCOUNTER — LAB ENCOUNTER (OUTPATIENT)
Dept: LAB | Age: 66
End: 2021-10-08
Attending: FAMILY MEDICINE
Payer: COMMERCIAL

## 2021-10-08 DIAGNOSIS — E87.6 HYPOKALEMIA: ICD-10-CM

## 2021-10-08 DIAGNOSIS — E87.6 HYPOPOTASSEMIA: Primary | ICD-10-CM

## 2021-10-08 PROCEDURE — 93005 ELECTROCARDIOGRAM TRACING: CPT

## 2021-10-08 PROCEDURE — 93010 ELECTROCARDIOGRAM REPORT: CPT | Performed by: FAMILY MEDICINE

## 2021-10-08 NOTE — TELEPHONE ENCOUNTER
I think it should be ok as it is before her kidney surgery    Caitlin Lynn MD  [facility], [facility] - Gastroenterology  10/8/2021  10:56 AM

## 2021-10-08 NOTE — TELEPHONE ENCOUNTER
Dr Phu Núñez- Dr William Schroeder next available is 11/15/2021. Would this time work? FYI: Dr Trinity Amor please see message below.

## 2021-10-08 NOTE — TELEPHONE ENCOUNTER
Scheduled for:  Colonoscopy  Provider Name:  Dr Connie Maciel  Date:  11/15/2021  Location:  [facility]  Sedation:  MAC  Time:  2:30PM (pt is aware to arrive at 1:30PM)    Prep:  TRILYTE  Meds/Allergies Reconciled?:  Physician reviewed  Diagnosis with codes:  Hx of C

## 2021-10-09 ENCOUNTER — HOSPITAL ENCOUNTER (OUTPATIENT)
Dept: CT IMAGING | Facility: HOSPITAL | Age: 66
Discharge: HOME | End: 2021-10-09
Attending: UROLOGY
Payer: COMMERCIAL

## 2021-10-09 ENCOUNTER — LAB ENCOUNTER (OUTPATIENT)
Dept: LAB | Facility: REFERENCE LAB | Age: 66
End: 2021-10-09
Attending: FAMILY MEDICINE
Payer: COMMERCIAL

## 2021-10-09 DIAGNOSIS — E87.6 HYPOKALEMIA: ICD-10-CM

## 2021-10-09 DIAGNOSIS — Z79.01 MONITORING FOR ANTICOAGULANT USE: ICD-10-CM

## 2021-10-09 DIAGNOSIS — N28.89 RIGHT RENAL MASS: ICD-10-CM

## 2021-10-09 DIAGNOSIS — Z51.81 MONITORING FOR ANTICOAGULANT USE: ICD-10-CM

## 2021-10-09 PROCEDURE — 85730 THROMBOPLASTIN TIME PARTIAL: CPT

## 2021-10-09 PROCEDURE — 71250 CT THORAX DX C-: CPT | Performed by: UROLOGY

## 2021-10-09 PROCEDURE — 80048 BASIC METABOLIC PNL TOTAL CA: CPT

## 2021-10-09 PROCEDURE — 85610 PROTHROMBIN TIME: CPT

## 2021-10-09 PROCEDURE — 36415 COLL VENOUS BLD VENIPUNCTURE: CPT

## 2021-10-10 DIAGNOSIS — N28.89 RENAL MASS: ICD-10-CM

## 2021-10-10 DIAGNOSIS — M95.8 ACQUIRED DEFORMITY OF CLAVICLE: ICD-10-CM

## 2021-10-10 DIAGNOSIS — D49.519 RENAL NEOPLASM: ICD-10-CM

## 2021-10-10 DIAGNOSIS — R93.89 ABNORMAL CT OF THE CHEST: Primary | ICD-10-CM

## 2021-10-26 RX ORDER — HYDROCHLOROTHIAZIDE 25 MG/1
TABLET ORAL
Qty: 90 TABLET | Refills: 1 | Status: SHIPPED | OUTPATIENT
Start: 2021-10-26

## 2021-10-26 RX ORDER — AMLODIPINE BESYLATE 10 MG/1
TABLET ORAL
Qty: 90 TABLET | Refills: 0 | Status: SHIPPED | OUTPATIENT
Start: 2021-10-26

## 2021-10-26 RX ORDER — PRAVASTATIN SODIUM 40 MG
TABLET ORAL
Qty: 90 TABLET | Refills: 1 | Status: SHIPPED | OUTPATIENT
Start: 2021-10-26

## 2021-11-05 ENCOUNTER — HOSPITAL ENCOUNTER (OUTPATIENT)
Dept: NUCLEAR MEDICINE | Facility: HOSPITAL | Age: 66
Discharge: HOME | End: 2021-11-05
Attending: UROLOGY
Payer: COMMERCIAL

## 2021-11-05 DIAGNOSIS — R93.89 ABNORMAL CT OF THE CHEST: ICD-10-CM

## 2021-11-05 DIAGNOSIS — M95.8 ACQUIRED DEFORMITY OF CLAVICLE: ICD-10-CM

## 2021-11-05 DIAGNOSIS — D49.519 RENAL NEOPLASM: ICD-10-CM

## 2021-11-05 DIAGNOSIS — N28.89 RENAL MASS: ICD-10-CM

## 2021-11-05 PROCEDURE — 78306 BONE IMAGING WHOLE BODY: CPT | Performed by: UROLOGY

## 2021-11-08 ENCOUNTER — TELEPHONE (OUTPATIENT)
Dept: GASTROENTEROLOGY | Facility: CLINIC | Age: 66
End: 2021-11-08

## 2021-11-08 NOTE — TELEPHONE ENCOUNTER
I called the pharmacy and spoke to [facility]. She had the bowel prep on file for the pt since she never picked it up on 10/08/21    I called and spoke to the pt and I let her know.     I will put her instructions on MyChart for her    She will call me with a

## 2021-11-08 NOTE — TELEPHONE ENCOUNTER
Patient states pharmacy has not received Preps Rx and has Questions regarding Prep instructions for 11/15/2021 CLN. Please call. Thank you.

## 2021-11-12 ENCOUNTER — LAB ENCOUNTER (OUTPATIENT)
Dept: LAB | Age: 66
End: 2021-11-12
Attending: INTERNAL MEDICINE
Payer: COMMERCIAL

## 2021-11-12 DIAGNOSIS — Z01.818 PRE-OP TESTING: ICD-10-CM

## 2021-11-15 ENCOUNTER — ANESTHESIA (OUTPATIENT)
Dept: ENDOSCOPY | Facility: HOSPITAL | Age: 66
End: 2021-11-15
Payer: COMMERCIAL

## 2021-11-15 ENCOUNTER — HOSPITAL ENCOUNTER (OUTPATIENT)
Facility: HOSPITAL | Age: 66
Setting detail: HOSPITAL OUTPATIENT SURGERY
Discharge: HOME | End: 2021-11-15
Attending: INTERNAL MEDICINE | Admitting: INTERNAL MEDICINE
Payer: COMMERCIAL

## 2021-11-15 ENCOUNTER — ANESTHESIA EVENT (OUTPATIENT)
Dept: ENDOSCOPY | Facility: HOSPITAL | Age: 66
End: 2021-11-15
Payer: COMMERCIAL

## 2021-11-15 VITALS
BODY MASS INDEX: 30.36 KG/M2 | WEIGHT: 165 LBS | OXYGEN SATURATION: 99 % | RESPIRATION RATE: 11 BRPM | HEART RATE: 77 BPM | SYSTOLIC BLOOD PRESSURE: 136 MMHG | HEIGHT: 62 IN | DIASTOLIC BLOOD PRESSURE: 59 MMHG

## 2021-11-15 DIAGNOSIS — Z01.818 PRE-OP TESTING: Primary | ICD-10-CM

## 2021-11-15 DIAGNOSIS — Z86.010 HISTORY OF COLON POLYPS: ICD-10-CM

## 2021-11-15 PROCEDURE — 45338 SIGMOIDOSCOPY W/TUMR REMOVE: CPT | Performed by: INTERNAL MEDICINE

## 2021-11-15 PROCEDURE — 3E0H8GC INTRODUCTION OF OTHER THERAPEUTIC SUBSTANCE INTO LOWER GI, VIA NATURAL OR ARTIFICIAL OPENING ENDOSCOPIC: ICD-10-PCS | Performed by: INTERNAL MEDICINE

## 2021-11-15 PROCEDURE — 45335 SIGMOIDOSCOPY W/SUBMUC INJ: CPT | Performed by: INTERNAL MEDICINE

## 2021-11-15 PROCEDURE — 0DBN8ZX EXCISION OF SIGMOID COLON, VIA NATURAL OR ARTIFICIAL OPENING ENDOSCOPIC, DIAGNOSTIC: ICD-10-PCS | Performed by: INTERNAL MEDICINE

## 2021-11-15 RX ORDER — LIDOCAINE HYDROCHLORIDE 10 MG/ML
INJECTION, SOLUTION EPIDURAL; INFILTRATION; INTRACAUDAL; PERINEURAL AS NEEDED
Status: DISCONTINUED | OUTPATIENT
Start: 2021-11-15 | End: 2021-11-15 | Stop reason: SURG

## 2021-11-15 RX ORDER — SODIUM CHLORIDE, SODIUM LACTATE, POTASSIUM CHLORIDE, CALCIUM CHLORIDE 600; 310; 30; 20 MG/100ML; MG/100ML; MG/100ML; MG/100ML
INJECTION, SOLUTION INTRAVENOUS CONTINUOUS
Status: DISCONTINUED | OUTPATIENT
Start: 2021-11-15 | End: 2021-11-15

## 2021-11-15 RX ADMIN — LIDOCAINE HYDROCHLORIDE 50 MG: 10 INJECTION, SOLUTION EPIDURAL; INFILTRATION; INTRACAUDAL; PERINEURAL at 13:48:00

## 2021-11-15 RX ADMIN — SODIUM CHLORIDE, SODIUM LACTATE, POTASSIUM CHLORIDE, CALCIUM CHLORIDE: 600; 310; 30; 20 INJECTION, SOLUTION INTRAVENOUS at 13:48:00

## 2021-11-15 NOTE — ANESTHESIA PREPROCEDURE EVALUATION
Anesthesia PreOp Note    HPI:     Sophia Mills is a 61year old female who presents for preoperative consultation requested by: Leandro Carvalho MD    Date of Surgery: 11/15/2021    Procedure(s):  COLONOSCOPY  Indication: History of colon poly current Epic-ordered outpatient medications on file.         Lisinopril              SWELLING  Radiology Contrast *    RASH    Family History   Problem Relation Age of Onset   • Diabetes Mother    • Hypertension Mother    • Hypertension Father    • Diabetes BMI.   vitals were not taken for this visit. There were no vitals filed for this visit.      Anesthesia Evaluation     Patient summary reviewed and Nursing notes reviewed    Airway   Mallampati: II  TM distance: >3 FB  Neck ROM: full  Dental      Pulmonar

## 2021-11-15 NOTE — H&P
History & Physical Examination    Patient Name: Zahira Victoria  MRN: [id number]  CSN: [id number]  YOB: 1955    Diagnosis: adenomatous sigmoid colon polyp    AMLODIPINE 10 MG Oral Tab, TAKE 1 TABLET BY MOUTH DAILY, Disp: 90 tablet, Rfl tobacco: Never Used    Alcohol use: Never      SYSTEM Check if Physical Exam is Normal If not normal, please explain:   CHANTEL Hwang  [ Dinora Ho [ Brian Piedra [ Juliana Kaye [ Babar Bell [ X]      I have discussed the risks and benefits an

## 2021-11-15 NOTE — OPERATIVE REPORT
FlexibleSigmoidoscopy Report    Vedia Sicard     3/30/1955 Age 61year old   PCP Jolene Saenz DO Endoscopist Britton Kaminski MD     Date of procedure: 11/15/21    Procedure: Flexible sigmoidoscopy w/ submucosal injection and snare polypecto insignificant    Specimens collected:  Colon polyp    Complications: none     Sigmoidoscoy findings:     The gastroscope fitted with a clear cap was advanced into the colon to the sigmoid colon where blue hued mucosa was noted starting at approximately 25 c

## 2021-11-15 NOTE — ANESTHESIA POSTPROCEDURE EVALUATION
Patient: Lenard Shine    Procedure Summary     Date: 11/15/21 Room / Location: [address] [place] [address] ENDOSCOPY 01 / [address] [place] [address] ENDOSCOPY    Anesthesia Start: 0433 Anesthesia Stop:     Procedure: COLONOSCOPY (N/A ) Diagnosis:       History of colon polyps      (colon poly

## 2021-11-17 ENCOUNTER — OFFICE VISIT (OUTPATIENT)
Dept: FAMILY MEDICINE CLINIC | Facility: CLINIC | Age: 66
End: 2021-11-17
Payer: COMMERCIAL

## 2021-11-17 VITALS
BODY MASS INDEX: 32.2 KG/M2 | HEIGHT: 62 IN | HEART RATE: 69 BPM | DIASTOLIC BLOOD PRESSURE: 78 MMHG | WEIGHT: 175 LBS | OXYGEN SATURATION: 98 % | SYSTOLIC BLOOD PRESSURE: 122 MMHG

## 2021-11-17 DIAGNOSIS — N28.89 RIGHT RENAL MASS: ICD-10-CM

## 2021-11-17 DIAGNOSIS — R73.03 PREDIABETES: ICD-10-CM

## 2021-11-17 DIAGNOSIS — Z23 NEED FOR VACCINATION: ICD-10-CM

## 2021-11-17 DIAGNOSIS — E78.5 HYPERLIPIDEMIA, UNSPECIFIED HYPERLIPIDEMIA TYPE: ICD-10-CM

## 2021-11-17 DIAGNOSIS — K63.5 POLYP OF SIGMOID COLON, UNSPECIFIED TYPE: ICD-10-CM

## 2021-11-17 DIAGNOSIS — Z01.818 PREOPERATIVE CLEARANCE: Primary | ICD-10-CM

## 2021-11-17 DIAGNOSIS — I10 ESSENTIAL HYPERTENSION: ICD-10-CM

## 2021-11-17 PROCEDURE — 3008F BODY MASS INDEX DOCD: CPT | Performed by: FAMILY MEDICINE

## 2021-11-17 PROCEDURE — 99214 OFFICE O/P EST MOD 30 MIN: CPT | Performed by: FAMILY MEDICINE

## 2021-11-17 PROCEDURE — 90750 HZV VACC RECOMBINANT IM: CPT | Performed by: FAMILY MEDICINE

## 2021-11-17 PROCEDURE — 90471 IMMUNIZATION ADMIN: CPT | Performed by: FAMILY MEDICINE

## 2021-11-17 PROCEDURE — 3078F DIAST BP <80 MM HG: CPT | Performed by: FAMILY MEDICINE

## 2021-11-17 PROCEDURE — 3074F SYST BP LT 130 MM HG: CPT | Performed by: FAMILY MEDICINE

## 2021-11-17 RX ORDER — POTASSIUM CHLORIDE 20 MEQ/1
40 TABLET, EXTENDED RELEASE ORAL DAILY
Qty: 180 TABLET | Refills: 0 | COMMUNITY
Start: 2021-11-17

## 2021-11-17 NOTE — PROGRESS NOTES
Torey Chirinos is a 61year old female. Patient presents with:  Pre-Op Exam: having nephrectomy on 11/23/21 by Jono Mckeon      HPI:   PREOP EXAM    PRE-OP Physical  What is the full name of procedure/ surgery?  Right robotic assisted laparoscopic Mother    • Hypertension Mother    • Hypertension Father    • Diabetes Sister    • Diabetes Brother    • Diabetes Sister        Current Outpatient Medications   Medication Sig Dispense Refill   • AMLODIPINE 10 MG Oral Tab TAKE 1 TABLET BY MOUTH DAILY 90 ta exertion  GI: denies abdominal pain and denies heartburn  NEURO: denies headaches    EXAM:   /78   Pulse 69   Ht 5' 2\" (1.575 m)   Wt 175 lb (79.4 kg)   SpO2 98%   Breastfeeding No   BMI 32.01 kg/m²  Body mass index is 32.01 kg/m².     GENERAL: well

## 2021-11-20 ENCOUNTER — LAB ENCOUNTER (OUTPATIENT)
Dept: LAB | Facility: HOSPITAL | Age: 66
End: 2021-11-20
Attending: UROLOGY
Payer: COMMERCIAL

## 2021-11-20 DIAGNOSIS — Z01.818 PREOPERATIVE CLEARANCE: ICD-10-CM

## 2021-11-20 DIAGNOSIS — Z01.818 PREOP TESTING: ICD-10-CM

## 2021-11-20 PROCEDURE — 36415 COLL VENOUS BLD VENIPUNCTURE: CPT

## 2021-11-20 PROCEDURE — 80048 BASIC METABOLIC PNL TOTAL CA: CPT

## 2021-11-20 PROCEDURE — 86901 BLOOD TYPING SEROLOGIC RH(D): CPT

## 2021-11-20 PROCEDURE — 86850 RBC ANTIBODY SCREEN: CPT

## 2021-11-20 PROCEDURE — 86900 BLOOD TYPING SEROLOGIC ABO: CPT

## 2021-11-23 ENCOUNTER — ANESTHESIA EVENT (OUTPATIENT)
Dept: SURGERY | Facility: HOSPITAL | Age: 66
End: 2021-11-23
Payer: COMMERCIAL

## 2021-11-23 ENCOUNTER — HOSPITAL ENCOUNTER (OUTPATIENT)
Facility: HOSPITAL | Age: 66
Discharge: HOME | End: 2021-11-25
Attending: UROLOGY | Admitting: UROLOGY
Payer: COMMERCIAL

## 2021-11-23 ENCOUNTER — ANESTHESIA (OUTPATIENT)
Dept: SURGERY | Facility: HOSPITAL | Age: 66
End: 2021-11-23
Payer: COMMERCIAL

## 2021-11-23 DIAGNOSIS — Z01.818 PREOP TESTING: Primary | ICD-10-CM

## 2021-11-23 DIAGNOSIS — N28.89 RIGHT RENAL MASS: ICD-10-CM

## 2021-11-23 PROCEDURE — 76775 US EXAM ABDO BACK WALL LIM: CPT | Performed by: UROLOGY

## 2021-11-23 PROCEDURE — 50543 LAPARO PARTIAL NEPHRECTOMY: CPT | Performed by: UROLOGY

## 2021-11-23 PROCEDURE — 99203 OFFICE O/P NEW LOW 30 MIN: CPT | Performed by: HOSPITALIST

## 2021-11-23 PROCEDURE — 0TB04ZZ EXCISION OF RIGHT KIDNEY, PERCUTANEOUS ENDOSCOPIC APPROACH: ICD-10-PCS | Performed by: UROLOGY

## 2021-11-23 PROCEDURE — 8E0W4CZ ROBOTIC ASSISTED PROCEDURE OF TRUNK REGION, PERCUTANEOUS ENDOSCOPIC APPROACH: ICD-10-PCS | Performed by: UROLOGY

## 2021-11-23 RX ORDER — CEFAZOLIN SODIUM/WATER 2 G/20 ML
2 SYRINGE (ML) INTRAVENOUS ONCE
Status: COMPLETED | OUTPATIENT
Start: 2021-11-23 | End: 2021-11-23

## 2021-11-23 RX ORDER — GLYCOPYRROLATE 0.2 MG/ML
INJECTION, SOLUTION INTRAMUSCULAR; INTRAVENOUS AS NEEDED
Status: DISCONTINUED | OUTPATIENT
Start: 2021-11-23 | End: 2021-11-23 | Stop reason: SURG

## 2021-11-23 RX ORDER — ONDANSETRON 2 MG/ML
INJECTION INTRAMUSCULAR; INTRAVENOUS AS NEEDED
Status: DISCONTINUED | OUTPATIENT
Start: 2021-11-23 | End: 2021-11-23 | Stop reason: SURG

## 2021-11-23 RX ORDER — ONDANSETRON 2 MG/ML
4 INJECTION INTRAMUSCULAR; INTRAVENOUS EVERY 6 HOURS PRN
Status: DISCONTINUED | OUTPATIENT
Start: 2021-11-23 | End: 2021-11-25

## 2021-11-23 RX ORDER — BUPIVACAINE HYDROCHLORIDE 5 MG/ML
INJECTION, SOLUTION EPIDURAL; INTRACAUDAL AS NEEDED
Status: DISCONTINUED | OUTPATIENT
Start: 2021-11-23 | End: 2021-11-23 | Stop reason: HOSPADM

## 2021-11-23 RX ORDER — SODIUM CHLORIDE, SODIUM LACTATE, POTASSIUM CHLORIDE, CALCIUM CHLORIDE 600; 310; 30; 20 MG/100ML; MG/100ML; MG/100ML; MG/100ML
INJECTION, SOLUTION INTRAVENOUS CONTINUOUS
Status: DISCONTINUED | OUTPATIENT
Start: 2021-11-23 | End: 2021-11-23 | Stop reason: HOSPADM

## 2021-11-23 RX ORDER — DEXAMETHASONE SODIUM PHOSPHATE 4 MG/ML
VIAL (ML) INJECTION AS NEEDED
Status: DISCONTINUED | OUTPATIENT
Start: 2021-11-23 | End: 2021-11-23 | Stop reason: SURG

## 2021-11-23 RX ORDER — METOCLOPRAMIDE HYDROCHLORIDE 5 MG/ML
5 INJECTION INTRAMUSCULAR; INTRAVENOUS EVERY 6 HOURS PRN
Status: DISCONTINUED | OUTPATIENT
Start: 2021-11-23 | End: 2021-11-25

## 2021-11-23 RX ORDER — OXYCODONE HYDROCHLORIDE 5 MG/1
10 TABLET ORAL EVERY 4 HOURS PRN
Status: DISCONTINUED | OUTPATIENT
Start: 2021-11-23 | End: 2021-11-25

## 2021-11-23 RX ORDER — METOCLOPRAMIDE 10 MG/1
10 TABLET ORAL ONCE
Status: COMPLETED | OUTPATIENT
Start: 2021-11-23 | End: 2021-11-23

## 2021-11-23 RX ORDER — ATORVASTATIN CALCIUM 10 MG/1
10 TABLET, FILM COATED ORAL NIGHTLY
Status: DISCONTINUED | OUTPATIENT
Start: 2021-11-23 | End: 2021-11-25

## 2021-11-23 RX ORDER — MORPHINE SULFATE 10 MG/ML
6 INJECTION, SOLUTION INTRAMUSCULAR; INTRAVENOUS EVERY 10 MIN PRN
Status: DISCONTINUED | OUTPATIENT
Start: 2021-11-23 | End: 2021-11-23 | Stop reason: HOSPADM

## 2021-11-23 RX ORDER — ENOXAPARIN SODIUM 100 MG/ML
40 INJECTION SUBCUTANEOUS NIGHTLY
Status: DISCONTINUED | OUTPATIENT
Start: 2021-11-23 | End: 2021-11-25

## 2021-11-23 RX ORDER — ROCURONIUM BROMIDE 10 MG/ML
INJECTION, SOLUTION INTRAVENOUS AS NEEDED
Status: DISCONTINUED | OUTPATIENT
Start: 2021-11-23 | End: 2021-11-23 | Stop reason: SURG

## 2021-11-23 RX ORDER — SODIUM CHLORIDE, SODIUM LACTATE, POTASSIUM CHLORIDE, CALCIUM CHLORIDE 600; 310; 30; 20 MG/100ML; MG/100ML; MG/100ML; MG/100ML
INJECTION, SOLUTION INTRAVENOUS CONTINUOUS PRN
Status: DISCONTINUED | OUTPATIENT
Start: 2021-11-23 | End: 2021-11-23 | Stop reason: SURG

## 2021-11-23 RX ORDER — LIDOCAINE HYDROCHLORIDE 10 MG/ML
INJECTION, SOLUTION EPIDURAL; INFILTRATION; INTRACAUDAL; PERINEURAL AS NEEDED
Status: DISCONTINUED | OUTPATIENT
Start: 2021-11-23 | End: 2021-11-23 | Stop reason: SURG

## 2021-11-23 RX ORDER — SODIUM CHLORIDE, SODIUM LACTATE, POTASSIUM CHLORIDE, CALCIUM CHLORIDE 600; 310; 30; 20 MG/100ML; MG/100ML; MG/100ML; MG/100ML
INJECTION, SOLUTION INTRAVENOUS CONTINUOUS
Status: DISCONTINUED | OUTPATIENT
Start: 2021-11-23 | End: 2021-11-23

## 2021-11-23 RX ORDER — METRONIDAZOLE 500 MG/100ML
500 INJECTION, SOLUTION INTRAVENOUS ONCE
Status: COMPLETED | OUTPATIENT
Start: 2021-11-23 | End: 2021-11-23

## 2021-11-23 RX ORDER — AMLODIPINE BESYLATE 10 MG/1
10 TABLET ORAL EVERY EVENING
Status: DISCONTINUED | OUTPATIENT
Start: 2021-11-23 | End: 2021-11-25

## 2021-11-23 RX ORDER — HYDROMORPHONE HYDROCHLORIDE 1 MG/ML
0.4 INJECTION, SOLUTION INTRAMUSCULAR; INTRAVENOUS; SUBCUTANEOUS EVERY 4 HOURS PRN
Status: DISCONTINUED | OUTPATIENT
Start: 2021-11-23 | End: 2021-11-25

## 2021-11-23 RX ORDER — MORPHINE SULFATE 4 MG/ML
2 INJECTION, SOLUTION INTRAMUSCULAR; INTRAVENOUS EVERY 10 MIN PRN
Status: DISCONTINUED | OUTPATIENT
Start: 2021-11-23 | End: 2021-11-23 | Stop reason: HOSPADM

## 2021-11-23 RX ORDER — ONDANSETRON 2 MG/ML
4 INJECTION INTRAMUSCULAR; INTRAVENOUS ONCE AS NEEDED
Status: DISCONTINUED | OUTPATIENT
Start: 2021-11-23 | End: 2021-11-23 | Stop reason: HOSPADM

## 2021-11-23 RX ORDER — CEFAZOLIN SODIUM/WATER 2 G/20 ML
2 SYRINGE (ML) INTRAVENOUS EVERY 8 HOURS
Status: COMPLETED | OUTPATIENT
Start: 2021-11-23 | End: 2021-11-24

## 2021-11-23 RX ORDER — SODIUM CHLORIDE, SODIUM LACTATE, POTASSIUM CHLORIDE, CALCIUM CHLORIDE 600; 310; 30; 20 MG/100ML; MG/100ML; MG/100ML; MG/100ML
INJECTION, SOLUTION INTRAVENOUS CONTINUOUS
Status: DISCONTINUED | OUTPATIENT
Start: 2021-11-23 | End: 2021-11-24

## 2021-11-23 RX ORDER — ONDANSETRON 4 MG/1
4 TABLET, FILM COATED ORAL EVERY 6 HOURS PRN
Status: DISCONTINUED | OUTPATIENT
Start: 2021-11-23 | End: 2021-11-25

## 2021-11-23 RX ORDER — HYDROCHLOROTHIAZIDE 25 MG/1
25 TABLET ORAL DAILY
Status: DISCONTINUED | OUTPATIENT
Start: 2021-11-24 | End: 2021-11-25

## 2021-11-23 RX ORDER — HYDROMORPHONE HYDROCHLORIDE 1 MG/ML
0.6 INJECTION, SOLUTION INTRAMUSCULAR; INTRAVENOUS; SUBCUTANEOUS EVERY 5 MIN PRN
Status: DISCONTINUED | OUTPATIENT
Start: 2021-11-23 | End: 2021-11-23 | Stop reason: HOSPADM

## 2021-11-23 RX ORDER — HYDROCODONE BITARTRATE AND ACETAMINOPHEN 5; 325 MG/1; MG/1
2 TABLET ORAL AS NEEDED
Status: DISCONTINUED | OUTPATIENT
Start: 2021-11-23 | End: 2021-11-23 | Stop reason: HOSPADM

## 2021-11-23 RX ORDER — HYDROMORPHONE HYDROCHLORIDE 1 MG/ML
0.2 INJECTION, SOLUTION INTRAMUSCULAR; INTRAVENOUS; SUBCUTANEOUS EVERY 5 MIN PRN
Status: DISCONTINUED | OUTPATIENT
Start: 2021-11-23 | End: 2021-11-23 | Stop reason: HOSPADM

## 2021-11-23 RX ORDER — OXYCODONE HYDROCHLORIDE 5 MG/1
5 TABLET ORAL EVERY 4 HOURS PRN
Status: DISCONTINUED | OUTPATIENT
Start: 2021-11-23 | End: 2021-11-25

## 2021-11-23 RX ORDER — FAMOTIDINE 20 MG/1
20 TABLET ORAL ONCE
Status: COMPLETED | OUTPATIENT
Start: 2021-11-23 | End: 2021-11-23

## 2021-11-23 RX ORDER — SODIUM CHLORIDE 9 MG/ML
INJECTION, SOLUTION INTRAVENOUS CONTINUOUS PRN
Status: DISCONTINUED | OUTPATIENT
Start: 2021-11-23 | End: 2021-11-23 | Stop reason: SURG

## 2021-11-23 RX ORDER — MORPHINE SULFATE 4 MG/ML
4 INJECTION, SOLUTION INTRAMUSCULAR; INTRAVENOUS EVERY 10 MIN PRN
Status: DISCONTINUED | OUTPATIENT
Start: 2021-11-23 | End: 2021-11-23 | Stop reason: HOSPADM

## 2021-11-23 RX ORDER — ACETAMINOPHEN 10 MG/ML
1000 INJECTION, SOLUTION INTRAVENOUS EVERY 8 HOURS
Status: COMPLETED | OUTPATIENT
Start: 2021-11-23 | End: 2021-11-24

## 2021-11-23 RX ORDER — ACETAMINOPHEN 500 MG
1000 TABLET ORAL ONCE
Status: COMPLETED | OUTPATIENT
Start: 2021-11-23 | End: 2021-11-23

## 2021-11-23 RX ORDER — HYDROMORPHONE HYDROCHLORIDE 1 MG/ML
0.4 INJECTION, SOLUTION INTRAMUSCULAR; INTRAVENOUS; SUBCUTANEOUS EVERY 5 MIN PRN
Status: DISCONTINUED | OUTPATIENT
Start: 2021-11-23 | End: 2021-11-23 | Stop reason: HOSPADM

## 2021-11-23 RX ORDER — NALOXONE HYDROCHLORIDE 0.4 MG/ML
80 INJECTION, SOLUTION INTRAMUSCULAR; INTRAVENOUS; SUBCUTANEOUS AS NEEDED
Status: DISCONTINUED | OUTPATIENT
Start: 2021-11-23 | End: 2021-11-23 | Stop reason: HOSPADM

## 2021-11-23 RX ORDER — HALOPERIDOL 5 MG/ML
0.25 INJECTION INTRAMUSCULAR ONCE AS NEEDED
Status: DISCONTINUED | OUTPATIENT
Start: 2021-11-23 | End: 2021-11-23 | Stop reason: HOSPADM

## 2021-11-23 RX ORDER — PROCHLORPERAZINE EDISYLATE 5 MG/ML
5 INJECTION INTRAMUSCULAR; INTRAVENOUS ONCE AS NEEDED
Status: DISCONTINUED | OUTPATIENT
Start: 2021-11-23 | End: 2021-11-23 | Stop reason: HOSPADM

## 2021-11-23 RX ORDER — HYDROCODONE BITARTRATE AND ACETAMINOPHEN 5; 325 MG/1; MG/1
1 TABLET ORAL AS NEEDED
Status: DISCONTINUED | OUTPATIENT
Start: 2021-11-23 | End: 2021-11-23 | Stop reason: HOSPADM

## 2021-11-23 RX ADMIN — ONDANSETRON 4 MG: 2 INJECTION INTRAMUSCULAR; INTRAVENOUS at 12:28:00

## 2021-11-23 RX ADMIN — SODIUM CHLORIDE, SODIUM LACTATE, POTASSIUM CHLORIDE, CALCIUM CHLORIDE: 600; 310; 30; 20 INJECTION, SOLUTION INTRAVENOUS at 13:07:00

## 2021-11-23 RX ADMIN — METRONIDAZOLE 500 MG: 500 INJECTION, SOLUTION INTRAVENOUS at 08:00:00

## 2021-11-23 RX ADMIN — ROCURONIUM BROMIDE 20 MG: 10 INJECTION, SOLUTION INTRAVENOUS at 09:01:00

## 2021-11-23 RX ADMIN — ROCURONIUM BROMIDE 5 MG: 10 INJECTION, SOLUTION INTRAVENOUS at 07:40:00

## 2021-11-23 RX ADMIN — SODIUM CHLORIDE: 9 INJECTION, SOLUTION INTRAVENOUS at 07:45:00

## 2021-11-23 RX ADMIN — CEFAZOLIN SODIUM/WATER 2 G: 2 G/20 ML SYRINGE (ML) INTRAVENOUS at 07:50:00

## 2021-11-23 RX ADMIN — ROCURONIUM BROMIDE 20 MG: 10 INJECTION, SOLUTION INTRAVENOUS at 08:33:00

## 2021-11-23 RX ADMIN — SODIUM CHLORIDE, SODIUM LACTATE, POTASSIUM CHLORIDE, CALCIUM CHLORIDE: 600; 310; 30; 20 INJECTION, SOLUTION INTRAVENOUS at 13:08:00

## 2021-11-23 RX ADMIN — SODIUM CHLORIDE, SODIUM LACTATE, POTASSIUM CHLORIDE, CALCIUM CHLORIDE: 600; 310; 30; 20 INJECTION, SOLUTION INTRAVENOUS at 11:18:00

## 2021-11-23 RX ADMIN — LIDOCAINE HYDROCHLORIDE 50 MG: 10 INJECTION, SOLUTION EPIDURAL; INFILTRATION; INTRACAUDAL; PERINEURAL at 07:41:00

## 2021-11-23 RX ADMIN — ROCURONIUM BROMIDE 20 MG: 10 INJECTION, SOLUTION INTRAVENOUS at 11:21:00

## 2021-11-23 RX ADMIN — DEXAMETHASONE SODIUM PHOSPHATE 4 MG: 4 MG/ML VIAL (ML) INJECTION at 08:26:00

## 2021-11-23 RX ADMIN — GLYCOPYRROLATE 0.2 MG: 0.2 INJECTION, SOLUTION INTRAMUSCULAR; INTRAVENOUS at 08:27:00

## 2021-11-23 RX ADMIN — SODIUM CHLORIDE, SODIUM LACTATE, POTASSIUM CHLORIDE, CALCIUM CHLORIDE: 600; 310; 30; 20 INJECTION, SOLUTION INTRAVENOUS at 07:35:00

## 2021-11-23 RX ADMIN — SODIUM CHLORIDE, SODIUM LACTATE, POTASSIUM CHLORIDE, CALCIUM CHLORIDE: 600; 310; 30; 20 INJECTION, SOLUTION INTRAVENOUS at 11:19:00

## 2021-11-23 RX ADMIN — ROCURONIUM BROMIDE 45 MG: 10 INJECTION, SOLUTION INTRAVENOUS at 07:45:00

## 2021-11-23 NOTE — ADDENDUM NOTE
Addendum  created 11/23/21 1415 by Ambar Simmons CRNA    Clinical Note Signed, Intraprocedure Blocks edited

## 2021-11-23 NOTE — H&P
History & Physical Examination    Patient Name: Brittney Hernandez  MRN: [id number]  CSN: [id number]  YOB: 1955    Diagnosis: Right Renal Mass (cT1b Nx Mx).     Present Illness: Patient is a pleasant 61year old female who was incidentally Social History    Tobacco Use      Smoking status: Never Smoker      Smokeless tobacco: Never Used    Alcohol use: Never      SYSTEM Check if Review is Normal Check if Physical Exam is Normal If not normal, please explain:   HEENT [X] [X]    NECK & DAVIN

## 2021-11-23 NOTE — ANESTHESIA PROCEDURE NOTES
Airway  Date/Time: 11/23/2021 7:42 AM  Urgency: elective    Airway not difficult    General Information and Staff    Patient location during procedure: OR  Anesthesiologist: Luis Alberto Price MD  Resident/CRNA: Shahram Lo CRNA  Performed: CRNA     Ind

## 2021-11-23 NOTE — PLAN OF CARE
Pt arrived to unit post L partial nephrectomy per Dr. Parish Gaming. Alert/oriented. Vitals stable. Lap sites x6 intact. PAMELA drain with 20mL bloody output. Clear liquids, some nausea. Reglan given. IVF infusing.  Pain control with scheduled Tylenol and dilaudid p social influences on pain and pain management  - Manage/alleviate anxiety  - Utilize distraction and/or relaxation techniques  - Monitor for opioid side effects  - Notify MD/LIP if interventions unsuccessful or patient reports new pain  - Anticipate increa post-hospital services based on physician/LIP order or complex needs related to functional status, cognitive ability or social support system  Outcome: Progressing

## 2021-11-23 NOTE — ANESTHESIA PREPROCEDURE EVALUATION
Anesthesia PreOp Note    HPI:     Lenard Shine is a 61year old female who presents for preoperative consultation requested by: Apoorva Byers MD    Date of Surgery: 11/23/2021    Procedure(s):  RIGHT XI ROBOTIC ASSISTED LAPAROSCOPIC PARTIAL N 11/21/2021 at 1800      lactated ringers infusion, , Intravenous, Continuous, Roderick Villegas MD  metRONIDAZOLE in NaCl (FLAGYL) IVPB premix 500 mg, 500 mg, Intravenous, Once, Yanelis Ludwig MD  ceFAZolin sodium (ANCEF/KEFZOL) 2 GM/20ML premix IV syri 30.0 10/06/2021    MCHC 33.3 10/06/2021    RDW 11.9 10/06/2021    .0 10/06/2021     Lab Results   Component Value Date     11/20/2021    K 3.9 11/20/2021     11/20/2021    CO2 28.0 11/20/2021    BUN 12 11/20/2021    CREATSERUM 1.18 (H) 1 answered to the best of my ability. The patient desires the anesthetic management as planned.   Derick Holden MD  11/23/2021 7:09 AM

## 2021-11-23 NOTE — PROGRESS NOTES
[facility] HOSP - [facility]    Progress Note    Jennifer Bass Patient Status:  Outpatient in a Bed    3/30/1955 MRN [id number]   Location [address] [facility] Attending Juan Sanders MD   Hosp Day # 0 PCP Chey Monson 10/06/2021    .0 10/06/2021    CREATSERUM 1.18 (H) 11/20/2021    BUN 12 11/20/2021     11/20/2021    K 3.9 11/20/2021     11/20/2021    CO2 28.0 11/20/2021    GLU 96 11/20/2021    CA 9.6 11/20/2021    ALB 4.0 10/06/2021    ALKPHO 61 10/0

## 2021-11-23 NOTE — OPERATIVE REPORT
[facility]   Urology Operative Note     Nalini Flores Location: OR   [facility] [id number] MRN [id number]   Admission Date 11/23/2021 Operation Date 11/23/2021   Service Urology Surgeon Milagro Greene MD      Primary Surgeon: Mac Elmore the inferior vena cava requiring additional dissection. Anesthesia: General endotracheal.      Surgical Findings: Right upper pole posterior renal mass. Robotic assisted laparoscopic right partial nephrectomy performed.   All gross potential disease was direct vision, consisting of a monopolar scissors, maryland bipolar dissector and a ProGrasp grasper. A self-locking grasper was used to grasp the diaphragm, in order to lift the liver anteriorly and cranially.  The colon was mobilized along the line of used to crispin out the margins of the resection. A total of three bulldog clamps were then placed on the 2 renal arteries and the vein was left unclamped.  Then cold resection was used with the scissors with minimal use of cautery, in a spiral circumferentia Interrupted 0 Vicryl sutures were used to close the fascia incision, 3-0 Vicryl was used for the subcutaneous tissues, and then the skin incisions were closed using 4-0 Monocryl and topical skin adhesive. The skin was infiltrated at the incisions using 0.

## 2021-11-23 NOTE — ANESTHESIA POSTPROCEDURE EVALUATION
Patient: Carter Wills    Procedure Summary     Date: 11/23/21 Room / Location: [address] [place] [address] MAIN OR 06 / [address] [place] [address] MAIN OR    Anesthesia Start: 5186 Anesthesia Stop: 6320    Procedure: RIGHT XI ROBOTIC ASSISTED LAPAROSCOPIC PARTIAL NEPHRECTOMY, INTRAOPERATIVE U

## 2021-11-23 NOTE — PROGRESS NOTES
[facility] Pharmacy Note:  Renal Dose Adjustment for Metoclopramide (REGLAN)    Sebastien Spenec has been prescribed Metoclopramide (REGLAN) 10 mg every 6 hours as needed for nausea/vomiting,.     Estimated Creatinine Clearance: 38.1 mL/min (A) (based on SCr

## 2021-11-24 PROCEDURE — 99214 OFFICE O/P EST MOD 30 MIN: CPT | Performed by: HOSPITALIST

## 2021-11-24 RX ORDER — SENNA AND DOCUSATE SODIUM 50; 8.6 MG/1; MG/1
2 TABLET, FILM COATED ORAL NIGHTLY PRN
Qty: 28 TABLET | Refills: 0 | Status: SHIPPED | OUTPATIENT
Start: 2021-11-24 | End: 2021-12-08

## 2021-11-24 RX ORDER — MAGNESIUM OXIDE 400 MG (241.3 MG MAGNESIUM) TABLET
400 TABLET ONCE
Status: COMPLETED | OUTPATIENT
Start: 2021-11-24 | End: 2021-11-24

## 2021-11-24 RX ORDER — HYDROCODONE BITARTRATE AND ACETAMINOPHEN 5; 325 MG/1; MG/1
1-2 TABLET ORAL EVERY 6 HOURS PRN
Qty: 30 TABLET | Refills: 0 | Status: SHIPPED | OUTPATIENT
Start: 2021-11-24 | End: 2021-11-25

## 2021-11-24 RX ORDER — POTASSIUM CHLORIDE 20 MEQ/1
40 TABLET, EXTENDED RELEASE ORAL DAILY
Status: DISCONTINUED | OUTPATIENT
Start: 2021-11-24 | End: 2021-11-25

## 2021-11-24 RX ORDER — SODIUM CHLORIDE, SODIUM LACTATE, POTASSIUM CHLORIDE, CALCIUM CHLORIDE 600; 310; 30; 20 MG/100ML; MG/100ML; MG/100ML; MG/100ML
INJECTION, SOLUTION INTRAVENOUS CONTINUOUS
Status: DISCONTINUED | OUTPATIENT
Start: 2021-11-24 | End: 2021-11-25

## 2021-11-24 NOTE — DISCHARGE SUMMARY
[facility]    Discharge Summary    Ganesh  Patient Status:  Outpatient in a Bed    3/30/1955 MRN [id number]   Location [facility] 4W/SW/SE Attending Janeth Henry MD   Hosp Day # 0 PCP Hodan Bates DO     Date of Pending Labs     Order Current Status    Specimen to Pathology Tissue In process          Discharge Plan:   Discharge Condition: Stable    Current Discharge Medication List    New Orders    HYDROcodone-acetaminophen 5-325 MG Oral Tab  Take 1-2 tablets Tb24  Commonly known as: Toprol XL      Take 75 mg by mouth every evening. Refills: 0     potassium chloride 20 MEQ Tbcr  Commonly known as: K-DUR M20      Take 2 tablets (40 mEq total) by mouth daily.    Quantity: 180 tablet  Refills: 0     pravastatin 4 types of foods will likely add to that. Drink plenty of fluids. You should start passing gas within 2-3 days of surgery. You should have a bowel movement within 3-5 days from surgery.      Wound Care  You may shower starting the second day after your surger

## 2021-11-24 NOTE — PROGRESS NOTES
[facility]  Urology Consult Follow-Up Note    Sophia Mills Patient Status:  Outpatient in a Bed    3/30/1955 MRN [id number]   Location [facility] 4W/SW/SE Attending Teresa Martinez MD   Hosp Day # 0 PCP and afebrile. Good UOP, lucio removed this am and she is due to void. PAMELA with serosanguinous drainage, small amount of output. Recommendations:  - Advance to general diet as tolerated. Encouraged patient to eat small frequent meals.     - Up to chair

## 2021-11-24 NOTE — PLAN OF CARE
VSS. Room Air. Main issue throughout night was nausea and vomiting. PRN Reglan and Zofran. Nausea seems to be improving this morning per patient. Hanson in place and draining clear, yellow. PAMELA draining serosanguinous.  PRN Oxy which patient stated eased the devices as appropriate  - Consider OT/PT consult to assist with strengthening/mobility  - Encourage toileting schedule  Outcome: Progressing     Problem: GASTROINTESTINAL - ADULT  Goal: Minimal or absence of nausea and vomiting  Description: INTERVENTIONS:

## 2021-11-25 VITALS
BODY MASS INDEX: 32.42 KG/M2 | SYSTOLIC BLOOD PRESSURE: 151 MMHG | HEART RATE: 51 BPM | HEIGHT: 62 IN | TEMPERATURE: 99 F | DIASTOLIC BLOOD PRESSURE: 57 MMHG | WEIGHT: 176.19 LBS | OXYGEN SATURATION: 94 % | RESPIRATION RATE: 18 BRPM

## 2021-11-25 PROCEDURE — 99214 OFFICE O/P EST MOD 30 MIN: CPT | Performed by: HOSPITALIST

## 2021-11-25 RX ORDER — HYDROCODONE BITARTRATE AND ACETAMINOPHEN 5; 325 MG/1; MG/1
1 TABLET ORAL EVERY 6 HOURS PRN
Qty: 20 TABLET | Refills: 0 | Status: SHIPPED | OUTPATIENT
Start: 2021-11-25 | End: 2021-12-17

## 2021-11-25 RX ORDER — SODIUM CHLORIDE 9 MG/ML
INJECTION INTRAVENOUS
Status: DISCONTINUED
Start: 2021-11-25 | End: 2021-11-25

## 2021-11-25 NOTE — PROGRESS NOTES
[facility]    Progress Note    Emma Christiansen Patient Status:  Outpatient in a Bed    3/30/1955 MRN [id number]   Location [facility] 4W/SW/SE Attending Fara Mcgee MD   Hosp Day # 0 PCP Puma Pierre DO       Subjective:

## 2021-11-25 NOTE — PLAN OF CARE
Patient is resting in bed, A&O x4, VSS, and denies pain or nausea. Tolerating general diet. IV is saline locked. Up independently and voiding freely. PAMELA drain is in place and emptying.  Bed is in lowest position, safety precautions are in place, and call li interventions unsuccessful or patient reports new pain  - Anticipate increased pain with activity and pre-medicate as appropriate  Outcome: Progressing     Problem: RISK FOR INFECTION - ADULT  Goal: Absence of fever/infection during anticipated neutropenic GASTROINTESTINAL - ADULT  Goal: Minimal or absence of nausea and vomiting  Description: INTERVENTIONS:  - Maintain adequate hydration with IV or PO as ordered and tolerated  - Nasogastric tube to low intermittent suction as ordered  - Evaluate effectivenes

## 2021-11-25 NOTE — PLAN OF CARE
Pt is alert/oriented. Vitals stable. Lap sites clean dry intact. PAMELA drain with 70mL output. Vomited x2 - once this morning and once this afternoon. Zofran and reglan prn. Tolerated dinner so far, tentative plan to discharge in AM if tolerating diet.  [employer] cultural and social influences on pain and pain management  - Manage/alleviate anxiety  - Utilize distraction and/or relaxation techniques  - Monitor for opioid side effects  - Notify MD/LIP if interventions unsuccessful or patient reports new pain  - Anti patient needs post-hospital services based on physician/LIP order or complex needs related to functional status, cognitive ability or social support system  Outcome: Progressing     Problem: GASTROINTESTINAL - ADULT  Goal: Minimal or absence of nausea and

## 2021-11-25 NOTE — PROGRESS NOTES
[facility]    Progress Note    Dinorah Dimas Patient Status:  Outpatient in a Bed    3/30/1955 MRN [id number]   Location [address] [facility] Attending Angi Robles MD   Hosp Day # 0 PCP Chris Lopez Results   Component Value Date    WBC 12.4 (H) 11/25/2021    HGB 10.7 (L) 11/25/2021    HCT 32.8 (L) 11/25/2021    .0 11/25/2021    CREATSERUM 1.12 (H) 11/25/2021    BUN 13 11/25/2021     11/25/2021    K 3.6 11/25/2021     11/25/2021

## 2021-11-26 ENCOUNTER — TELEPHONE (OUTPATIENT)
Dept: SURGERY | Facility: CLINIC | Age: 66
End: 2021-11-26

## 2021-11-26 NOTE — TELEPHONE ENCOUNTER
Please advise, can patient be seen by NP? Or please advise when patient should be seen. No future appointments.

## 2021-11-26 NOTE — TELEPHONE ENCOUNTER
Pt contacted. Post op appointment scheduled 12-8-21 doctor not available and needs to rescheduled. Pt had surgery 11-23-21. Pt is not sure when pt would need to be seen.   Please call pt to advise

## 2021-11-30 NOTE — TELEPHONE ENCOUNTER
Tasked to [facility], can pt see [facility] insteadsince you will be out of the office. Follow up:             Follow-up Information           Radha Arroyo MD. Go on 12/8/2021.     Specialty: UROLOGY  Why: at 1:30 pm; [facility], [address] parking, 2nd floo

## 2021-12-08 NOTE — TELEPHONE ENCOUNTER
S/W pt and offered her the appt for 12 noon on Fri. 12/17 and she accepted. I WILL HAVE BAM OPEN THE SLOT SO I CAN BOOK THE PT'S APPT.

## 2021-12-08 NOTE — TELEPHONE ENCOUNTER
For now, please add her on to my schedule for 12/17/21 at noon. I'm in the OR that day doing minor cases but I should be able to see her. We might need to tweak the time of the appointment however Iet's do noon for now.      ZH

## 2021-12-16 ENCOUNTER — TELEPHONE (OUTPATIENT)
Dept: SURGERY | Facility: CLINIC | Age: 66
End: 2021-12-16

## 2021-12-16 NOTE — TELEPHONE ENCOUNTER
LM will also send [facility] message.      Future Appointments   Date Time Provider Ana Hernández   12/17/2021 12:00 PM [facility]

## 2021-12-17 ENCOUNTER — OFFICE VISIT (OUTPATIENT)
Dept: SURGERY | Facility: CLINIC | Age: 66
End: 2021-12-17
Payer: COMMERCIAL

## 2021-12-17 DIAGNOSIS — N28.89 RIGHT RENAL MASS: Primary | ICD-10-CM

## 2021-12-17 PROCEDURE — 99024 POSTOP FOLLOW-UP VISIT: CPT | Performed by: UROLOGY

## 2021-12-17 NOTE — PROGRESS NOTES
[facility] Urology  Follow-Up Visit    HPI: Emma Christiansen is a 61year old female presents for a follow up visit. Patient was last seen on 10/6/2021. Here by herself.     INTERVAL HISTORY: Patient is status post right robotic assisted laparos 11/23/2021: underwent right robotic assisted laparoscopic partial nephrectomy. 24 minutes warm ischemia time. Pathology pending and sent for consultation at [facility]. PAST MEDICAL HISTORY: HTN, HLD, pre-DM.      PAST SURGICAL HISTORY: Tubal ligati    CONCLUSION:  1. Polypoid sigmoid colonic lesion described on recent prior colonoscopy does not demonstrate a convincing CT correlate. Please note that the majority of the sigmoid colon is incompletely distended and suboptimally evaluated.   There are understanding. All questions answered. PLAN:  1. Final pathology results from right partial nephrectomy still pending, awaiting report of consultation from [facility]. Patient will be notified of the results and further follow-up accordingly.     2.

## 2022-01-19 ENCOUNTER — OFFICE VISIT (OUTPATIENT)
Dept: SURGERY | Facility: CLINIC | Age: 67
End: 2022-01-19
Payer: COMMERCIAL

## 2022-01-19 DIAGNOSIS — C64.1 RENAL CELL CARCINOMA OF RIGHT KIDNEY (HCC): Primary | ICD-10-CM

## 2022-01-19 PROBLEM — N28.89 RIGHT RENAL MASS: Status: RESOLVED | Noted: 2021-11-23 | Resolved: 2022-01-19

## 2022-01-19 PROCEDURE — 99024 POSTOP FOLLOW-UP VISIT: CPT | Performed by: UROLOGY

## 2022-01-19 RX ORDER — PREDNISONE 50 MG/1
TABLET ORAL
Qty: 3 TABLET | Refills: 0 | Status: SHIPPED | OUTPATIENT
Start: 2022-01-19

## 2022-01-19 NOTE — PROGRESS NOTES
[address] [facility] Urology  Follow-Up Visit    HPI: Henrique Graft is a 61year old female presents for a follow up visit. Patient was last seen on 12/17/2021. Here by herself.     INTERVAL HISTORY: Patient is status post right robotic assisted laparo 11/23/2021: underwent right robotic assisted laparoscopic partial nephrectomy. 24 minutes warm ischemia time.   Pathology pending and sent for consultation at [facility].    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  · Tumor measures 4.3 cm in greatest dimension and is limited to the kidney. · No vascular or perinephric fat invasion identified.   · See synoptic report for fu 0.55 - 1.02 mg/dL 1.12 1.01 0.85   eGFR      >=60 60 68 84       IMAGING:    CT ABDOMEN+PELVIS w/IV Contrast (10/1/2021):      KIDNEYS:  Suspicious heterogeneously enhancing exophytic 4.4 x 3.8 x 4.0 cm right upper pole renal mass.      C chest x-ray, and BMP prior to office visit. 2. Patient is cleared from a urological standpoint to proceed with surgical management of large colonic polyp.       Ramsey Rosenthal MD  01/19/22

## 2022-01-23 ENCOUNTER — TELEPHONE (OUTPATIENT)
Dept: GASTROENTEROLOGY | Facility: CLINIC | Age: 67
End: 2022-01-23

## 2022-01-23 NOTE — TELEPHONE ENCOUNTER
Received messages from Dr. Nisha Mejia and Dr. Rayne Wilde regarding completion and recovery from recent kidney surgery and clearance for surgery of her colon polyp.      GI staff:    Please contact patient and ask her to schedule an appointment with Dr. Yudi Seymour to whom

## 2022-01-24 NOTE — TELEPHONE ENCOUNTER
Spoke with [employer]. Reviewed MD response below. Agreed reasonable consulting with Dr. Anmol Emerson in relation to removal of large colon polyp. Verified MD phone number to contact there office.      Expressed understanding on instructions provided and appr

## 2022-02-01 ENCOUNTER — TELEPHONE (OUTPATIENT)
Dept: SURGERY | Facility: CLINIC | Age: 67
End: 2022-02-01

## 2022-02-01 ENCOUNTER — OFFICE VISIT (OUTPATIENT)
Dept: SURGERY | Facility: CLINIC | Age: 67
End: 2022-02-01
Payer: COMMERCIAL

## 2022-02-01 VITALS — WEIGHT: 176 LBS | BODY MASS INDEX: 32 KG/M2

## 2022-02-01 DIAGNOSIS — D12.5 ADENOMATOUS POLYP OF SIGMOID COLON: Primary | ICD-10-CM

## 2022-02-01 PROCEDURE — 99204 OFFICE O/P NEW MOD 45 MIN: CPT | Performed by: SURGERY

## 2022-02-01 RX ORDER — METRONIDAZOLE 500 MG/1
TABLET ORAL
Qty: 6 TABLET | Refills: 0 | Status: SHIPPED | OUTPATIENT
Start: 2022-03-08 | End: 2022-03-08

## 2022-02-01 RX ORDER — NEOMYCIN SULFATE 500 MG/1
TABLET ORAL
Qty: 6 TABLET | Refills: 0 | Status: SHIPPED | OUTPATIENT
Start: 2022-03-08 | End: 2022-03-08

## 2022-02-01 NOTE — TELEPHONE ENCOUNTER
Pt was wondering if she should be taking prednisone and I notified her that this is to be taken prior to exam. Instructed her to take prednisone and benadryl prior to CT exam due to her contrast dye allergy that is listed as a rash reaction in order to prevent an adverse reaction. Notified her to take prednisone 13 hours before, 7 hours before, and 1 hour before and to take benadryl 1 hour prior. Instructed her to call central scheduling to set up appointment for exam. Pt verbalized understanding.

## 2022-02-01 NOTE — TELEPHONE ENCOUNTER
Pt was in office for an appt with another provider and asked if she should be taking the medication Dr Mercy Cannon ordered for her on 1/19/2022

## 2022-02-07 ENCOUNTER — TELEPHONE (OUTPATIENT)
Dept: SURGERY | Facility: CLINIC | Age: 67
End: 2022-02-07

## 2022-02-23 RX ORDER — AMLODIPINE BESYLATE 10 MG/1
10 TABLET ORAL DAILY
Qty: 90 TABLET | Refills: 0 | Status: SHIPPED | OUTPATIENT
Start: 2022-02-23

## 2022-03-03 ENCOUNTER — TELEPHONE (OUTPATIENT)
Dept: SURGERY | Facility: CLINIC | Age: 67
End: 2022-03-03

## 2022-03-07 ENCOUNTER — TELEPHONE (OUTPATIENT)
Dept: SURGERY | Facility: CLINIC | Age: 67
End: 2022-03-07

## 2022-03-19 ENCOUNTER — HOSPITAL ENCOUNTER (OUTPATIENT)
Dept: GENERAL RADIOLOGY | Facility: HOSPITAL | Age: 67
Discharge: HOME | End: 2022-03-19
Attending: UROLOGY
Payer: COMMERCIAL

## 2022-03-19 ENCOUNTER — HOSPITAL ENCOUNTER (OUTPATIENT)
Dept: CT IMAGING | Facility: HOSPITAL | Age: 67
Discharge: HOME | End: 2022-03-19
Attending: UROLOGY
Payer: COMMERCIAL

## 2022-03-19 ENCOUNTER — LAB ENCOUNTER (OUTPATIENT)
Dept: LAB | Facility: HOSPITAL | Age: 67
End: 2022-03-19
Attending: UROLOGY
Payer: COMMERCIAL

## 2022-03-19 DIAGNOSIS — C64.1 RENAL CELL CARCINOMA OF RIGHT KIDNEY (HCC): ICD-10-CM

## 2022-03-19 DIAGNOSIS — Z01.818 PREOP TESTING: ICD-10-CM

## 2022-03-19 LAB
ANION GAP SERPL CALC-SCNC: 7 MMOL/L (ref 0–18)
ANTIBODY SCREEN: NEGATIVE
BUN BLD-MCNC: 13 MG/DL (ref 7–18)
BUN/CREAT SERPL: 12.7 (ref 10–20)
CALCIUM BLD-MCNC: 9.5 MG/DL (ref 8.5–10.1)
CHLORIDE SERPL-SCNC: 104 MMOL/L (ref 98–112)
CO2 SERPL-SCNC: 30 MMOL/L (ref 21–32)
CREAT BLD-MCNC: 1.02 MG/DL
GLUCOSE BLD-MCNC: 116 MG/DL (ref 70–99)
OSMOLALITY SERPL CALC.SUM OF ELEC: 293 MOSM/KG (ref 275–295)
POTASSIUM SERPL-SCNC: 4 MMOL/L (ref 3.5–5.1)
RH BLOOD TYPE: POSITIVE
SODIUM SERPL-SCNC: 141 MMOL/L (ref 136–145)

## 2022-03-19 PROCEDURE — 86900 BLOOD TYPING SEROLOGIC ABO: CPT

## 2022-03-19 PROCEDURE — 36415 COLL VENOUS BLD VENIPUNCTURE: CPT

## 2022-03-19 PROCEDURE — 86850 RBC ANTIBODY SCREEN: CPT

## 2022-03-19 PROCEDURE — 71046 X-RAY EXAM CHEST 2 VIEWS: CPT | Performed by: UROLOGY

## 2022-03-19 PROCEDURE — 80048 BASIC METABOLIC PNL TOTAL CA: CPT

## 2022-03-19 PROCEDURE — 86901 BLOOD TYPING SEROLOGIC RH(D): CPT

## 2022-03-20 ENCOUNTER — LAB ENCOUNTER (OUTPATIENT)
Dept: LAB | Facility: HOSPITAL | Age: 67
End: 2022-03-20
Attending: SURGERY
Payer: COMMERCIAL

## 2022-03-20 DIAGNOSIS — Z01.818 PREOP TESTING: ICD-10-CM

## 2022-03-21 LAB — SARS-COV-2 RNA RESP QL NAA+PROBE: NOT DETECTED

## 2022-03-21 RX ORDER — METRONIDAZOLE 500 MG/1
500 TABLET ORAL 3 TIMES DAILY
COMMUNITY
End: 2022-03-26

## 2022-03-21 RX ORDER — NEOMYCIN SULFATE 500 MG/1
500 TABLET ORAL 4 TIMES DAILY
COMMUNITY
End: 2022-03-26

## 2022-03-21 NOTE — TELEPHONE ENCOUNTER
Patient would like a call back. States she has some questions in regards to her procedure scheduled for Wednesday 03/23/2022.  Please advise

## 2022-03-23 ENCOUNTER — HOSPITAL ENCOUNTER (INPATIENT)
Facility: HOSPITAL | Age: 67
LOS: 3 days | Discharge: HOME | DRG: 331 | End: 2022-03-26
Attending: SURGERY | Admitting: HOSPITALIST
Payer: COMMERCIAL

## 2022-03-23 ENCOUNTER — ANESTHESIA EVENT (OUTPATIENT)
Dept: SURGERY | Facility: HOSPITAL | Age: 67
DRG: 331 | End: 2022-03-23
Payer: COMMERCIAL

## 2022-03-23 ENCOUNTER — ANESTHESIA (OUTPATIENT)
Dept: SURGERY | Facility: HOSPITAL | Age: 67
DRG: 331 | End: 2022-03-23
Payer: COMMERCIAL

## 2022-03-23 DIAGNOSIS — D12.5 ADENOMATOUS POLYP OF SIGMOID COLON: ICD-10-CM

## 2022-03-23 DIAGNOSIS — Z01.818 PREOP TESTING: Primary | ICD-10-CM

## 2022-03-23 PROBLEM — D12.6 ADENOMATOUS COLON POLYP: Status: ACTIVE | Noted: 2022-03-23

## 2022-03-23 PROCEDURE — 88307 TISSUE EXAM BY PATHOLOGIST: CPT | Performed by: SURGERY

## 2022-03-23 PROCEDURE — 88341 IMHCHEM/IMCYTCHM EA ADD ANTB: CPT | Performed by: SURGERY

## 2022-03-23 PROCEDURE — 88309 TISSUE EXAM BY PATHOLOGIST: CPT | Performed by: SURGERY

## 2022-03-23 PROCEDURE — S0030 INJECTION, METRONIDAZOLE: HCPCS

## 2022-03-23 PROCEDURE — 88305 TISSUE EXAM BY PATHOLOGIST: CPT | Performed by: SURGERY

## 2022-03-23 PROCEDURE — 88342 IMHCHEM/IMCYTCHM 1ST ANTB: CPT | Performed by: SURGERY

## 2022-03-23 PROCEDURE — 0T778DZ DILATION OF LEFT URETER WITH INTRALUMINAL DEVICE, VIA NATURAL OR ARTIFICIAL OPENING ENDOSCOPIC: ICD-10-PCS | Performed by: UROLOGY

## 2022-03-23 PROCEDURE — 0DTN4ZZ RESECTION OF SIGMOID COLON, PERCUTANEOUS ENDOSCOPIC APPROACH: ICD-10-PCS | Performed by: SURGERY

## 2022-03-23 PROCEDURE — C9113 INJ PANTOPRAZOLE SODIUM, VIA: HCPCS | Performed by: SURGERY

## 2022-03-23 RX ORDER — AMLODIPINE BESYLATE 10 MG/1
10 TABLET ORAL DAILY
Status: DISCONTINUED | OUTPATIENT
Start: 2022-03-24 | End: 2022-03-26

## 2022-03-23 RX ORDER — HEPARIN SODIUM 5000 [USP'U]/ML
5000 INJECTION, SOLUTION INTRAVENOUS; SUBCUTANEOUS EVERY 12 HOURS SCHEDULED
Status: DISCONTINUED | OUTPATIENT
Start: 2022-03-23 | End: 2022-03-26

## 2022-03-23 RX ORDER — MORPHINE SULFATE 4 MG/ML
2 INJECTION, SOLUTION INTRAMUSCULAR; INTRAVENOUS EVERY 10 MIN PRN
Status: DISCONTINUED | OUTPATIENT
Start: 2022-03-23 | End: 2022-03-23 | Stop reason: HOSPADM

## 2022-03-23 RX ORDER — LIDOCAINE HYDROCHLORIDE ANHYDROUS AND DEXTROSE MONOHYDRATE .8; 5 G/100ML; G/100ML
INJECTION, SOLUTION INTRAVENOUS CONTINUOUS PRN
Status: DISCONTINUED | OUTPATIENT
Start: 2022-03-23 | End: 2022-03-23 | Stop reason: SURG

## 2022-03-23 RX ORDER — KETAMINE HYDROCHLORIDE 50 MG/ML
INJECTION, SOLUTION, CONCENTRATE INTRAMUSCULAR; INTRAVENOUS AS NEEDED
Status: DISCONTINUED | OUTPATIENT
Start: 2022-03-23 | End: 2022-03-23 | Stop reason: SURG

## 2022-03-23 RX ORDER — PROCHLORPERAZINE EDISYLATE 5 MG/ML
5 INJECTION INTRAMUSCULAR; INTRAVENOUS ONCE AS NEEDED
Status: DISCONTINUED | OUTPATIENT
Start: 2022-03-23 | End: 2022-03-23 | Stop reason: HOSPADM

## 2022-03-23 RX ORDER — ONDANSETRON 2 MG/ML
4 INJECTION INTRAMUSCULAR; INTRAVENOUS ONCE AS NEEDED
Status: DISCONTINUED | OUTPATIENT
Start: 2022-03-23 | End: 2022-03-23 | Stop reason: HOSPADM

## 2022-03-23 RX ORDER — ROCURONIUM BROMIDE 10 MG/ML
INJECTION, SOLUTION INTRAVENOUS AS NEEDED
Status: DISCONTINUED | OUTPATIENT
Start: 2022-03-23 | End: 2022-03-23 | Stop reason: SURG

## 2022-03-23 RX ORDER — METRONIDAZOLE 500 MG/100ML
500 INJECTION, SOLUTION INTRAVENOUS ONCE
Status: COMPLETED | OUTPATIENT
Start: 2022-03-23 | End: 2022-03-23

## 2022-03-23 RX ORDER — PROCHLORPERAZINE EDISYLATE 5 MG/ML
5 INJECTION INTRAMUSCULAR; INTRAVENOUS EVERY 8 HOURS PRN
Status: DISCONTINUED | OUTPATIENT
Start: 2022-03-23 | End: 2022-03-26

## 2022-03-23 RX ORDER — ACETAMINOPHEN 500 MG
1000 TABLET ORAL ONCE
Status: COMPLETED | OUTPATIENT
Start: 2022-03-23 | End: 2022-03-23

## 2022-03-23 RX ORDER — OXYCODONE HYDROCHLORIDE 5 MG/1
5 TABLET ORAL EVERY 4 HOURS PRN
Status: DISCONTINUED | OUTPATIENT
Start: 2022-03-23 | End: 2022-03-26

## 2022-03-23 RX ORDER — LIDOCAINE HYDROCHLORIDE 10 MG/ML
INJECTION, SOLUTION EPIDURAL; INFILTRATION; INTRACAUDAL; PERINEURAL AS NEEDED
Status: DISCONTINUED | OUTPATIENT
Start: 2022-03-23 | End: 2022-03-23 | Stop reason: SURG

## 2022-03-23 RX ORDER — SODIUM CHLORIDE, SODIUM LACTATE, POTASSIUM CHLORIDE, CALCIUM CHLORIDE 600; 310; 30; 20 MG/100ML; MG/100ML; MG/100ML; MG/100ML
INJECTION, SOLUTION INTRAVENOUS CONTINUOUS
Status: DISCONTINUED | OUTPATIENT
Start: 2022-03-23 | End: 2022-03-25

## 2022-03-23 RX ORDER — HYDROCODONE BITARTRATE AND ACETAMINOPHEN 5; 325 MG/1; MG/1
1 TABLET ORAL AS NEEDED
Status: DISCONTINUED | OUTPATIENT
Start: 2022-03-23 | End: 2022-03-23 | Stop reason: HOSPADM

## 2022-03-23 RX ORDER — HYDROCODONE BITARTRATE AND ACETAMINOPHEN 5; 325 MG/1; MG/1
2 TABLET ORAL AS NEEDED
Status: DISCONTINUED | OUTPATIENT
Start: 2022-03-23 | End: 2022-03-23 | Stop reason: HOSPADM

## 2022-03-23 RX ORDER — HYDROMORPHONE HYDROCHLORIDE 1 MG/ML
0.4 INJECTION, SOLUTION INTRAMUSCULAR; INTRAVENOUS; SUBCUTANEOUS EVERY 5 MIN PRN
Status: DISCONTINUED | OUTPATIENT
Start: 2022-03-23 | End: 2022-03-23 | Stop reason: HOSPADM

## 2022-03-23 RX ORDER — HYDROMORPHONE HYDROCHLORIDE 1 MG/ML
0.4 INJECTION, SOLUTION INTRAMUSCULAR; INTRAVENOUS; SUBCUTANEOUS EVERY 2 HOUR PRN
Status: DISCONTINUED | OUTPATIENT
Start: 2022-03-23 | End: 2022-03-26

## 2022-03-23 RX ORDER — FAMOTIDINE 20 MG/1
20 TABLET, FILM COATED ORAL ONCE
Status: COMPLETED | OUTPATIENT
Start: 2022-03-23 | End: 2022-03-23

## 2022-03-23 RX ORDER — PRAVASTATIN SODIUM 20 MG
20 TABLET ORAL NIGHTLY
Status: DISCONTINUED | OUTPATIENT
Start: 2022-03-23 | End: 2022-03-26

## 2022-03-23 RX ORDER — METOPROLOL TARTRATE 5 MG/5ML
2.5 INJECTION INTRAVENOUS ONCE
Status: DISCONTINUED | OUTPATIENT
Start: 2022-03-23 | End: 2022-03-23 | Stop reason: HOSPADM

## 2022-03-23 RX ORDER — MIDAZOLAM HYDROCHLORIDE 1 MG/ML
INJECTION INTRAMUSCULAR; INTRAVENOUS AS NEEDED
Status: DISCONTINUED | OUTPATIENT
Start: 2022-03-23 | End: 2022-03-23 | Stop reason: SURG

## 2022-03-23 RX ORDER — SODIUM CHLORIDE, SODIUM LACTATE, POTASSIUM CHLORIDE, CALCIUM CHLORIDE 600; 310; 30; 20 MG/100ML; MG/100ML; MG/100ML; MG/100ML
INJECTION, SOLUTION INTRAVENOUS CONTINUOUS
Status: DISCONTINUED | OUTPATIENT
Start: 2022-03-23 | End: 2022-03-23 | Stop reason: HOSPADM

## 2022-03-23 RX ORDER — METOCLOPRAMIDE 10 MG/1
10 TABLET ORAL ONCE
Status: COMPLETED | OUTPATIENT
Start: 2022-03-23 | End: 2022-03-23

## 2022-03-23 RX ORDER — KETOROLAC TROMETHAMINE 15 MG/ML
15 INJECTION, SOLUTION INTRAMUSCULAR; INTRAVENOUS EVERY 6 HOURS
Status: DISPENSED | OUTPATIENT
Start: 2022-03-23 | End: 2022-03-25

## 2022-03-23 RX ORDER — EPHEDRINE SULFATE 50 MG/ML
INJECTION, SOLUTION INTRAVENOUS AS NEEDED
Status: DISCONTINUED | OUTPATIENT
Start: 2022-03-23 | End: 2022-03-23 | Stop reason: SURG

## 2022-03-23 RX ORDER — NALOXONE HYDROCHLORIDE 0.4 MG/ML
80 INJECTION, SOLUTION INTRAMUSCULAR; INTRAVENOUS; SUBCUTANEOUS AS NEEDED
Status: DISCONTINUED | OUTPATIENT
Start: 2022-03-23 | End: 2022-03-23 | Stop reason: HOSPADM

## 2022-03-23 RX ORDER — BUPIVACAINE HYDROCHLORIDE 5 MG/ML
INJECTION, SOLUTION EPIDURAL; INTRACAUDAL AS NEEDED
Status: DISCONTINUED | OUTPATIENT
Start: 2022-03-23 | End: 2022-03-23 | Stop reason: HOSPADM

## 2022-03-23 RX ORDER — HYDROCHLOROTHIAZIDE 25 MG/1
25 TABLET ORAL DAILY
Status: DISCONTINUED | OUTPATIENT
Start: 2022-03-24 | End: 2022-03-26

## 2022-03-23 RX ORDER — MORPHINE SULFATE 10 MG/ML
6 INJECTION, SOLUTION INTRAMUSCULAR; INTRAVENOUS EVERY 10 MIN PRN
Status: DISCONTINUED | OUTPATIENT
Start: 2022-03-23 | End: 2022-03-23 | Stop reason: HOSPADM

## 2022-03-23 RX ORDER — HYDROMORPHONE HYDROCHLORIDE 1 MG/ML
0.6 INJECTION, SOLUTION INTRAMUSCULAR; INTRAVENOUS; SUBCUTANEOUS EVERY 5 MIN PRN
Status: DISCONTINUED | OUTPATIENT
Start: 2022-03-23 | End: 2022-03-23 | Stop reason: HOSPADM

## 2022-03-23 RX ORDER — OXYCODONE HYDROCHLORIDE 5 MG/1
10 TABLET ORAL EVERY 4 HOURS PRN
Status: DISCONTINUED | OUTPATIENT
Start: 2022-03-23 | End: 2022-03-26

## 2022-03-23 RX ORDER — MORPHINE SULFATE 4 MG/ML
4 INJECTION, SOLUTION INTRAMUSCULAR; INTRAVENOUS EVERY 10 MIN PRN
Status: DISCONTINUED | OUTPATIENT
Start: 2022-03-23 | End: 2022-03-23 | Stop reason: HOSPADM

## 2022-03-23 RX ORDER — DEXAMETHASONE SODIUM PHOSPHATE 4 MG/ML
VIAL (ML) INJECTION AS NEEDED
Status: DISCONTINUED | OUTPATIENT
Start: 2022-03-23 | End: 2022-03-23 | Stop reason: SURG

## 2022-03-23 RX ORDER — SODIUM CHLORIDE, SODIUM LACTATE, POTASSIUM CHLORIDE, CALCIUM CHLORIDE 600; 310; 30; 20 MG/100ML; MG/100ML; MG/100ML; MG/100ML
INJECTION, SOLUTION INTRAVENOUS CONTINUOUS
Status: DISCONTINUED | OUTPATIENT
Start: 2022-03-23 | End: 2022-03-26

## 2022-03-23 RX ORDER — CEFAZOLIN SODIUM/WATER 2 G/20 ML
2 SYRINGE (ML) INTRAVENOUS ONCE
Status: COMPLETED | OUTPATIENT
Start: 2022-03-23 | End: 2022-03-23

## 2022-03-23 RX ORDER — HYDROMORPHONE HYDROCHLORIDE 1 MG/ML
0.8 INJECTION, SOLUTION INTRAMUSCULAR; INTRAVENOUS; SUBCUTANEOUS EVERY 2 HOUR PRN
Status: DISCONTINUED | OUTPATIENT
Start: 2022-03-23 | End: 2022-03-26

## 2022-03-23 RX ORDER — ONDANSETRON 2 MG/ML
INJECTION INTRAMUSCULAR; INTRAVENOUS AS NEEDED
Status: DISCONTINUED | OUTPATIENT
Start: 2022-03-23 | End: 2022-03-23 | Stop reason: SURG

## 2022-03-23 RX ORDER — HALOPERIDOL 5 MG/ML
0.25 INJECTION INTRAMUSCULAR ONCE AS NEEDED
Status: DISCONTINUED | OUTPATIENT
Start: 2022-03-23 | End: 2022-03-23 | Stop reason: HOSPADM

## 2022-03-23 RX ORDER — HYDROMORPHONE HYDROCHLORIDE 1 MG/ML
0.2 INJECTION, SOLUTION INTRAMUSCULAR; INTRAVENOUS; SUBCUTANEOUS EVERY 5 MIN PRN
Status: DISCONTINUED | OUTPATIENT
Start: 2022-03-23 | End: 2022-03-23 | Stop reason: HOSPADM

## 2022-03-23 RX ORDER — ONDANSETRON 2 MG/ML
4 INJECTION INTRAMUSCULAR; INTRAVENOUS EVERY 6 HOURS PRN
Status: DISCONTINUED | OUTPATIENT
Start: 2022-03-23 | End: 2022-03-26

## 2022-03-23 RX ORDER — SODIUM CHLORIDE 9 MG/ML
INJECTION, SOLUTION INTRAVENOUS CONTINUOUS PRN
Status: DISCONTINUED | OUTPATIENT
Start: 2022-03-23 | End: 2022-03-23 | Stop reason: SURG

## 2022-03-23 RX ADMIN — LIDOCAINE HYDROCHLORIDE 50 MG: 10 INJECTION, SOLUTION EPIDURAL; INFILTRATION; INTRACAUDAL; PERINEURAL at 07:45:00

## 2022-03-23 RX ADMIN — SODIUM CHLORIDE, SODIUM LACTATE, POTASSIUM CHLORIDE, CALCIUM CHLORIDE: 600; 310; 30; 20 INJECTION, SOLUTION INTRAVENOUS at 07:45:00

## 2022-03-23 RX ADMIN — KETAMINE HYDROCHLORIDE 35 MG: 50 INJECTION, SOLUTION, CONCENTRATE INTRAMUSCULAR; INTRAVENOUS at 07:58:00

## 2022-03-23 RX ADMIN — SODIUM CHLORIDE: 9 INJECTION, SOLUTION INTRAVENOUS at 07:57:00

## 2022-03-23 RX ADMIN — METRONIDAZOLE 500 MG: 500 INJECTION, SOLUTION INTRAVENOUS at 07:53:00

## 2022-03-23 RX ADMIN — DEXAMETHASONE SODIUM PHOSPHATE 4 MG: 4 MG/ML VIAL (ML) INJECTION at 07:45:00

## 2022-03-23 RX ADMIN — CEFAZOLIN SODIUM/WATER 2 G: 2 G/20 ML SYRINGE (ML) INTRAVENOUS at 07:53:00

## 2022-03-23 RX ADMIN — EPHEDRINE SULFATE 5 MG: 50 INJECTION, SOLUTION INTRAVENOUS at 08:21:00

## 2022-03-23 RX ADMIN — SODIUM CHLORIDE: 9 INJECTION, SOLUTION INTRAVENOUS at 09:56:00

## 2022-03-23 RX ADMIN — LIDOCAINE HYDROCHLORIDE ANHYDROUS AND DEXTROSE MONOHYDRATE 1 MG/MIN: .8; 5 INJECTION, SOLUTION INTRAVENOUS at 07:56:00

## 2022-03-23 RX ADMIN — MIDAZOLAM HYDROCHLORIDE 2 MG: 1 INJECTION INTRAMUSCULAR; INTRAVENOUS at 07:36:00

## 2022-03-23 RX ADMIN — ROCURONIUM BROMIDE 50 MG: 10 INJECTION, SOLUTION INTRAVENOUS at 07:45:00

## 2022-03-23 RX ADMIN — ONDANSETRON 4 MG: 2 INJECTION INTRAMUSCULAR; INTRAVENOUS at 09:50:00

## 2022-03-23 RX ADMIN — SODIUM CHLORIDE, SODIUM LACTATE, POTASSIUM CHLORIDE, CALCIUM CHLORIDE: 600; 310; 30; 20 INJECTION, SOLUTION INTRAVENOUS at 09:55:00

## 2022-03-23 NOTE — OPERATIVE REPORT
[facility]   Urology Operative Report     Wan Juan Location: OR   CSN [id number] MRN [id number]   Admission Date 3/23/2022 Operation Date 3/23/2022   Service Urology Surgeon Yoseph Stearns MD      Primary Surgeon: Yoseph Stearns MD      Assistant(s): None. Indication for Procedure: Patient is a pleasant 21-year-old female with history of stage I right kidney RCC status post robotic partial nephrectomy 11/2021. She has a large sigmoid polyp not amenable to endoscopic resection. She has been seen by general surgery and presents today for elective laparoscopic sigmoid colectomy. Urologic assistance was requested to perform cystoscopy and insertion of a left-sided ureteral stent at the beginning of the procedure to aid in intraoperative identification of the left ureter. We discussed the procedure with the patient including the rationale, approach, benefits, risks, possible complications, reasonable alternatives. She verbalized understanding and wishes to proceed. Preoperative Diagnosis: Adenomatous polyp of sigmoid colon [D12.5]     Postoperative Diagnosis: Adenomatous polyp of sigmoid colon [D12.5]     Procedure Performed: CYSTOSCOPY WITH INSERTION OF  LEFT LIGHTED URETERAL STENT. Anesthesia: General endotracheal.     Surgical Findings: Normal bladder without masses, stones, or lesions. Description of Procedure: The patient was brought to the operating room and identified by their wristband including the name, medical record number, and date of birth. They were transferred to the operating room table. Appropriate monitoring devices were connected to the patient. Successful induction of general level endotracheal anesthesia was achieved. IV antibiotics were administered for surgical prophylaxis. The patient was then positioned in dorsal lithotomy with their legs in [address] [place] [address] and all pressure areas and bony prominences padded appropriately.  The surgical site was prepped and draped in standard sterile fashion. A full surgical timeout was performed with agreement upon all of its components. A 22 [place] Zealander rigid cystoscope with a 30 degree lens was introduced per urethral meatus and advanced up to the level of the urinary bladder. Pan-cystoscopy was performed which revealed no evidence of any tumors, masses, stones or lesions. The bilateral ureteral orifices were identified in their orthotopic locations. A 0.035 sensor guidewire was introduced and the left ureteral orifice was cannulated. The outer open-ended sheath of the [place] lighted ureteral stent kit was introduced over the wire into the left ureteral orifice and advanced up to the 20 cm level under direct visual guidance without meeting any resistance. The wire was removed. Cystoscope was removed while maintaining the open-ended catheter in place. A 16 [place] Zealander Hanson catheter was inserted alongside the stent and 10 mL's of sterile water were utilized to inflate the balloon once correct positioning was confirmed. The inner lighted fiber was then introduced through the outer sheath and secured to the Hanson catheter tubing using clips. This concluded my part of the procedure which the patient tolerated well without any immediate complications. The case was turned over to Dr. Eren Acevedo for their part of the operation. Estimated Blood Loss: None for urology. Complications: None for urology. Specimens Removed: None for urology. Drains: [place] lighted left ureteral stent. 16 New Zealander urethral Hanson catheter. Patient's Condition at the End of the Procedure: Remains intubated for colorectal surgery. Disposition: Dr. Eren Acevedo to perform his operation. He may remove the lighted stent when no longer needed. Hanson management per surgery protocol. I was present, scrubbed and performed the urological procedure in its entirety.     Anamika Amezcua MD  3/23/2022

## 2022-03-23 NOTE — ANESTHESIA POSTPROCEDURE EVALUATION
Patient: João Cortes    Procedure Summary     Date: 03/23/22 Room / Location: [address] [place] [address] MAIN OR 12 / [address] [place] [address] MAIN OR    Anesthesia Start: 8488 Anesthesia Stop: 3097    Procedures:       LAPAROSCOPIC SIGMOIDECTOMY, SPLENIC FLEXURE MOBILIZATION (DREW), INSERTION OF  LEFT LIGHTED URETERAL PLACEMENT (Nelly) (Left Abdomen)      LIGHTED URETERAL STENT INSERTION (Left Urethra) Diagnosis:       Adenomatous polyp of sigmoid colon      (Adenomatous polyp of sigmoid colon [D12.5])    Surgeons: Raman Gómez MD; Jay Jay Calvo MD Anesthesiologist: Tenisha Brooks MD    Anesthesia Type: general ASA Status: 3          Anesthesia Type: general    Vitals Value Taken Time   /62 03/23/22 1040   Temp 97.8 03/23/22 1040   Pulse 75 03/23/22 1040   Resp 14 03/23/22 1040   SpO2 100 % 03/23/22 1040   Vitals shown include unvalidated device data.     [address] [place] [address] AN Post Evaluation:   Patient Evaluated in PACU  Patient Participation: waiting for patient participation  Level of Consciousness: sleepy but conscious  Pain Management: adequate  Airway Patency:patent  Dental exam unchanged from preop  Yes    Cardiovascular Status: acceptable  Respiratory Status: acceptable and nasal cannula  Postoperative Hydration acceptable      Odella Gitelman, CRNA  3/23/2022 10:40 AM

## 2022-03-23 NOTE — ANESTHESIA PROCEDURE NOTES
Airway  Date/Time: 3/23/2022 7:47 AM  Urgency: elective    Airway not difficult    General Information and Staff    Patient location during procedure: OR  Anesthesiologist: Carmen Davis MD  Resident/CRNA: Opal Mar CRNA  Performed: CRNA     Indications and Patient Condition  Indications for airway management: airway protection and anesthesia  Preoxygenated: yes  Mask difficulty assessment: 1 - vent by mask    Final Airway Details  Final airway type: endotracheal airway      Successful airway: ETT  Cuffed: yes   Successful intubation technique: Video laryngoscopy  Facilitating devices/methods: intubating stylet  Blade: GlideScope  Blade size: #3  ETT size (mm): 7.0    Cormack-Lehane Classification: grade I - full view of glottis  Placement verified by: chest auscultation and capnometry

## 2022-03-23 NOTE — OPERATIVE REPORT
Operative Report    Patient Name:  Graciela Linares  MR:  [id number]  :  3/30/1955  DOS:  24    Preop Dx: Adenomatous polyp of sigmoid colon [D12.5]  Postop Dx: Adenomatous polyp of sigmoid colon [D12.5]  Procedure:    1, Laparoscopic sigmoidectomy with colorectal anastomosis  2. Laparoscopic splenic flexure mobilization  Surgeon:  Praveen Turner MD  Surgical Assistant.: Trang Adhikari CSA, Enrico Reyes MD  EBL: Blood Output: 50 mL (3/23/2022  7:64 AM)    Complication:  None    INDICATION:  Pt is a 61year old female who with Adenomatous polyp of sigmoid colon [D12.5] who is scheduled for a LAPAROSCOPIC SIGMOIDECTOMY, SPLENIC FLEXURE MOBILIZATION (DREW), INSERTION OF  LEFT LIGHTED URETERAL PLACEMENT (Nelly). CONSENT:  An informed consent discussion was held with the patient regarding the nature of large colonic polyps, the treatment options and the details of the procedure. The risks including but not limited to bleeding, wound infection, intra-abdominal infection, injury to the colon, small intestine, left ureter, incomplete resection, anastomotic leak/bleeding/stricture, internal and incisional hernia were discussed. The patient expressed understanding and want to proceed with the planned procedure. TECHNIQUE:  The patient was taken to the OR and placed in supine position. General anesthesia was established and the patient was placed in lithotomy position. Dr. Geovanny Gomez performed a cystoscopy and placement of a left lighted ureteral stent. Please see his operative note for further details. The perineum and abdomen were then prepped in standard fashion. Pneumoperitoneum was obtained using Veress needle technique through a supraumbilical incision. A 5-mm trocar was inserted under direct visualization and no injury occurred. Examination of the abdomen showed no obvious pathology. Four additional 5-mm trocars were placed in each of the abdominal quadrants.   A Pfannenstiel incision was made to place the small Ayden port device. Examination of the sigmoid colon showed areas of endoscopic ink tattoo of the colon proximal and distal to the colonic polyp. The left lighted ureteral stent was identified. We performed a lateral to medial approach by incising the line of Toldt. The descending and sigmoid colon were free from the retroperitoneum. The splenic flexure was mobilized to allow the descending colon to reach the pelvis. The mesentery of the sigmoid colon was divided using the Enseal.  The distal sigmoid was divided using a 60 x 3.6 mm linear stapler. The sigmoid colon was delivered through the Brooke port. The descending colon was divided and the anvil head of the 29 mm EEA was inserted. The anvil head was secured using a 3-0 PDS. The handle of the EEA was inserted per rectum. The spike was advanced through the rectal stump and connected to the anvil head. The EEA was created and two complete donuts were seen. I further reinforced the EEA using interrupted 4-0 PDS. The EEA was tested using air insufflation and found to be air tight. There was no tension, twisting or bleeding from the EEA. The abdomen was irrigated with saline and found to be hemostatic. The fascia of the Pfannenstiel incision was closed using 0 looped PDS. The ports were withdrawn under direct visualization and no port site bleeding was seen. The skin incisions were closed using 4-0 vicryl. Sterile dressings were applied. All instrument and sponge counts were correct. I was present during the critical portions of the procedure.         Taz Pacheco MD

## 2022-03-24 LAB
ANION GAP SERPL CALC-SCNC: 6 MMOL/L (ref 0–18)
BASOPHILS # BLD AUTO: 0.01 X10(3) UL (ref 0–0.2)
BASOPHILS NFR BLD AUTO: 0.1 %
BUN BLD-MCNC: 12 MG/DL (ref 7–18)
BUN/CREAT SERPL: 10.8 (ref 10–20)
CALCIUM BLD-MCNC: 8.1 MG/DL (ref 8.5–10.1)
CHLORIDE SERPL-SCNC: 109 MMOL/L (ref 98–112)
CO2 SERPL-SCNC: 26 MMOL/L (ref 21–32)
CREAT BLD-MCNC: 1.11 MG/DL
DEPRECATED RDW RBC AUTO: 42.1 FL (ref 35.1–46.3)
EOSINOPHIL # BLD AUTO: 0 X10(3) UL (ref 0–0.7)
EOSINOPHIL NFR BLD AUTO: 0 %
ERYTHROCYTE [DISTWIDTH] IN BLOOD BY AUTOMATED COUNT: 12.4 % (ref 11–15)
GLUCOSE BLD-MCNC: 117 MG/DL (ref 70–99)
HCT VFR BLD AUTO: 32.1 %
HGB BLD-MCNC: 10.6 G/DL
IMM GRANULOCYTES # BLD AUTO: 0.05 X10(3) UL (ref 0–1)
IMM GRANULOCYTES NFR BLD: 0.4 %
LYMPHOCYTES # BLD AUTO: 1.54 X10(3) UL (ref 1–4)
LYMPHOCYTES NFR BLD AUTO: 13.5 %
MAGNESIUM SERPL-MCNC: 2.2 MG/DL (ref 1.6–2.6)
MCH RBC QN AUTO: 30.6 PG (ref 26–34)
MCHC RBC AUTO-ENTMCNC: 33 G/DL (ref 31–37)
MCV RBC AUTO: 92.8 FL
MONOCYTES # BLD AUTO: 0.87 X10(3) UL (ref 0.1–1)
MONOCYTES NFR BLD AUTO: 7.7 %
NEUTROPHILS # BLD AUTO: 8.9 X10 (3) UL (ref 1.5–7.7)
NEUTROPHILS # BLD AUTO: 8.9 X10(3) UL (ref 1.5–7.7)
NEUTROPHILS NFR BLD AUTO: 78.3 %
OSMOLALITY SERPL CALC.SUM OF ELEC: 293 MOSM/KG (ref 275–295)
PHOSPHATE SERPL-MCNC: 2.8 MG/DL (ref 2.5–4.9)
PLATELET # BLD AUTO: 246 10(3)UL (ref 150–450)
POTASSIUM SERPL-SCNC: 3.7 MMOL/L (ref 3.5–5.1)
RBC # BLD AUTO: 3.46 X10(6)UL
SODIUM SERPL-SCNC: 141 MMOL/L (ref 136–145)
WBC # BLD AUTO: 11.4 X10(3) UL (ref 4–11)

## 2022-03-24 PROCEDURE — C9113 INJ PANTOPRAZOLE SODIUM, VIA: HCPCS | Performed by: SURGERY

## 2022-03-24 PROCEDURE — 83735 ASSAY OF MAGNESIUM: CPT | Performed by: SURGERY

## 2022-03-24 PROCEDURE — 85025 COMPLETE CBC W/AUTO DIFF WBC: CPT | Performed by: SURGERY

## 2022-03-24 PROCEDURE — 80048 BASIC METABOLIC PNL TOTAL CA: CPT | Performed by: SURGERY

## 2022-03-24 PROCEDURE — 84100 ASSAY OF PHOSPHORUS: CPT | Performed by: SURGERY

## 2022-03-24 NOTE — PLAN OF CARE
No c/o nausea. Scheduled toradol covering pain. Patient moving well, up with walker and SBA to bathroom. Voiding [place] clear urine, MD aware. No flatus yet. IVF infusing, still NPO with sips of clear allowed. Safety plan in place, calling appropriately for assistance. Plan is home upon discharge. Problem: Patient/Family Goals  Goal: Patient/Family Short Term Goal  Description: Patient's Short Term Goal: no pain and recovery quickly  - See additional Care Plan goals for specific interventions  Outcome: Progressing         Monitor pain  Monitor VS  Monitor labs  administer medications  Keep patient updated on plan of care  Attentive listening        Problem: PAIN - ADULT  Goal: Verbalizes/displays adequate comfort level or patient's stated pain goal  Description: INTERVENTIONS:  - Encourage pt to monitor pain and request assistance  - Assess pain using appropriate pain scale  - Administer analgesics based on type and severity of pain and evaluate response  - Implement non-pharmacological measures as appropriate and evaluate response  - Consider cultural and social influences on pain and pain management  - Manage/alleviate anxiety  - Utilize distraction and/or relaxation techniques  - Monitor for opioid side effects  - Notify MD/LIP if interventions unsuccessful or patient reports new pain  - Anticipate increased pain with activity and pre-medicate as appropriate  Outcome: Progressing     Problem: RISK FOR INFECTION - ADULT  Goal: Absence of fever/infection during anticipated neutropenic period  Description: INTERVENTIONS  - Monitor WBC  - Administer growth factors as ordered  - Implement neutropenic guidelines  Outcome: Progressing     Problem: SAFETY ADULT - FALL  Goal: Free from fall injury  Description: INTERVENTIONS:  - Assess pt frequently for physical needs  - Identify cognitive and physical deficits and behaviors that affect risk of falls.   - [place] fall precautions as indicated by assessment.  - Educate pt/family on patient safety including physical limitations  - Instruct pt to call for assistance with activity based on assessment  - Modify environment to reduce risk of injury  - Provide assistive devices as appropriate  - Consider OT/PT consult to assist with strengthening/mobility  - Encourage toileting schedule  Outcome: Progressing     Problem: DISCHARGE PLANNING  Goal: Discharge to home or other facility with appropriate resources  Description: INTERVENTIONS:  - Identify barriers to discharge w/pt and caregiver  - Include patient/family/discharge partner in discharge planning  - Arrange for needed discharge resources and transportation as appropriate  - Identify discharge learning needs (meds, wound care, etc)  - Arrange for interpreters to assist at discharge as needed  - Consider post-discharge preferences of patient/family/discharge partner  - Complete POLST form as appropriate  - Assess patient's ability to be responsible for managing their own health  - Refer to Case Management Department for coordinating discharge planning if the patient needs post-hospital services based on physician/LIP order or complex needs related to functional status, cognitive ability or social support system  Outcome: Progressing

## 2022-03-24 NOTE — PLAN OF CARE
Olesya Mike is alert/oriented. Vitals stable. Tolerating sips of clear liquids and ice chips. Ambulating with assistance. IVF infusing. Pain control adequate with scheduled toradol. Heparin given for DVT prophylaxis. Voiding adequately. No gas yet, ambulation encouraged to promote. Plan of care reviewed with pt. Bed low, locked, all safety measures in place.     Problem: Patient Centered Care  Goal: Patient preferences are identified and integrated in the patient's plan of care  Description: Interventions:  - What would you like us to know as we care for you?   - Provide timely, complete, and accurate information to patient/family  - Incorporate patient and family knowledge, values, beliefs, and cultural backgrounds into the planning and delivery of care  - Encourage patient/family to participate in care and decision-making at the level they choose  - Honor patient and family perspectives and choices  Outcome: Progressing     Problem: Patient/Family Goals  Goal: Patient/Family Long Term Goal  Description: Patient's Long Term Goal:     Interventions:  -   - See additional Care Plan goals for specific interventions  Outcome: Progressing  Goal: Patient/Family Short Term Goal  Description: Patient's Short Term Goal:     Interventions:   -   - See additional Care Plan goals for specific interventions  Outcome: Progressing     Problem: PAIN - ADULT  Goal: Verbalizes/displays adequate comfort level or patient's stated pain goal  Description: INTERVENTIONS:  - Encourage pt to monitor pain and request assistance  - Assess pain using appropriate pain scale  - Administer analgesics based on type and severity of pain and evaluate response  - Implement non-pharmacological measures as appropriate and evaluate response  - Consider cultural and social influences on pain and pain management  - Manage/alleviate anxiety  - Utilize distraction and/or relaxation techniques  - Monitor for opioid side effects  - Notify MD/LIP if interventions unsuccessful or patient reports new pain  - Anticipate increased pain with activity and pre-medicate as appropriate  Outcome: Progressing     Problem: RISK FOR INFECTION - ADULT  Goal: Absence of fever/infection during anticipated neutropenic period  Description: INTERVENTIONS  - Monitor WBC  - Administer growth factors as ordered  - Implement neutropenic guidelines  Outcome: Progressing     Problem: SAFETY ADULT - FALL  Goal: Free from fall injury  Description: INTERVENTIONS:  - Assess pt frequently for physical needs  - Identify cognitive and physical deficits and behaviors that affect risk of falls.   - [place] fall precautions as indicated by assessment.  - Educate pt/family on patient safety including physical limitations  - Instruct pt to call for assistance with activity based on assessment  - Modify environment to reduce risk of injury  - Provide assistive devices as appropriate  - Consider OT/PT consult to assist with strengthening/mobility  - Encourage toileting schedule  Outcome: Progressing     Problem: DISCHARGE PLANNING  Goal: Discharge to home or other facility with appropriate resources  Description: INTERVENTIONS:  - Identify barriers to discharge w/pt and caregiver  - Include patient/family/discharge partner in discharge planning  - Arrange for needed discharge resources and transportation as appropriate  - Identify discharge learning needs (meds, wound care, etc)  - Arrange for interpreters to assist at discharge as needed  - Consider post-discharge preferences of patient/family/discharge partner  - Complete POLST form as appropriate  - Assess patient's ability to be responsible for managing their own health  - Refer to Case Management Department for coordinating discharge planning if the patient needs post-hospital services based on physician/LIP order or complex needs related to functional status, cognitive ability or social support system  Outcome: Progressing

## 2022-03-25 LAB
ANION GAP SERPL CALC-SCNC: 4 MMOL/L (ref 0–18)
BASOPHILS # BLD AUTO: 0.01 X10(3) UL (ref 0–0.2)
BASOPHILS NFR BLD AUTO: 0.1 %
BUN BLD-MCNC: 11 MG/DL (ref 7–18)
BUN/CREAT SERPL: 11.7 (ref 10–20)
CALCIUM BLD-MCNC: 8.7 MG/DL (ref 8.5–10.1)
CHLORIDE SERPL-SCNC: 108 MMOL/L (ref 98–112)
CO2 SERPL-SCNC: 31 MMOL/L (ref 21–32)
CREAT BLD-MCNC: 0.94 MG/DL
DEPRECATED RDW RBC AUTO: 41.5 FL (ref 35.1–46.3)
EOSINOPHIL # BLD AUTO: 0.04 X10(3) UL (ref 0–0.7)
EOSINOPHIL NFR BLD AUTO: 0.5 %
ERYTHROCYTE [DISTWIDTH] IN BLOOD BY AUTOMATED COUNT: 12.2 % (ref 11–15)
GLUCOSE BLD-MCNC: 76 MG/DL (ref 70–99)
HCT VFR BLD AUTO: 33 %
HGB BLD-MCNC: 10.6 G/DL
IMM GRANULOCYTES # BLD AUTO: 0.02 X10(3) UL (ref 0–1)
IMM GRANULOCYTES NFR BLD: 0.2 %
LYMPHOCYTES # BLD AUTO: 2.83 X10(3) UL (ref 1–4)
LYMPHOCYTES NFR BLD AUTO: 33.5 %
MCH RBC QN AUTO: 29.7 PG (ref 26–34)
MCHC RBC AUTO-ENTMCNC: 32.1 G/DL (ref 31–37)
MCV RBC AUTO: 92.4 FL
MONOCYTES # BLD AUTO: 0.69 X10(3) UL (ref 0.1–1)
MONOCYTES NFR BLD AUTO: 8.2 %
NEUTROPHILS # BLD AUTO: 4.86 X10 (3) UL (ref 1.5–7.7)
NEUTROPHILS # BLD AUTO: 4.86 X10(3) UL (ref 1.5–7.7)
NEUTROPHILS NFR BLD AUTO: 57.5 %
OSMOLALITY SERPL CALC.SUM OF ELEC: 294 MOSM/KG (ref 275–295)
PLATELET # BLD AUTO: 239 10(3)UL (ref 150–450)
POTASSIUM SERPL-SCNC: 3.4 MMOL/L (ref 3.5–5.1)
RBC # BLD AUTO: 3.57 X10(6)UL
SODIUM SERPL-SCNC: 143 MMOL/L (ref 136–145)
WBC # BLD AUTO: 8.5 X10(3) UL (ref 4–11)

## 2022-03-25 PROCEDURE — 85025 COMPLETE CBC W/AUTO DIFF WBC: CPT | Performed by: HOSPITALIST

## 2022-03-25 PROCEDURE — C9113 INJ PANTOPRAZOLE SODIUM, VIA: HCPCS | Performed by: SURGERY

## 2022-03-25 PROCEDURE — 80048 BASIC METABOLIC PNL TOTAL CA: CPT | Performed by: HOSPITALIST

## 2022-03-25 RX ORDER — POTASSIUM CHLORIDE 20 MEQ/1
40 TABLET, EXTENDED RELEASE ORAL ONCE
Status: COMPLETED | OUTPATIENT
Start: 2022-03-25 | End: 2022-03-25

## 2022-03-25 NOTE — PLAN OF CARE
Passed flatus overnight, no BM. Scheduled toradol covering pain. Up with walker and SBA to bathroom. Abdominal binder in place. Urine clear yellow, voiding well. IVF infusing. NPO with sips of clear , no c/o nausea. Safety plan in place, calling appropriately for assistance. Plan is home with family upon discharge.     Problem: Patient Centered Care  Goal: Patient preferences are identified and integrated in the patient's plan of care  Description: Interventions:  - What would you like us to know as we care for you?   - Provide timely, complete, and accurate information to patient/family  - Incorporate patient and family knowledge, values, beliefs, and cultural backgrounds into the planning and delivery of care  - Encourage patient/family to participate in care and decision-making at the level they choose  - Honor patient and family perspectives and choices  Outcome: Progressing     Problem: PAIN - ADULT  Goal: Verbalizes/displays adequate comfort level or patient's stated pain goal  Description: INTERVENTIONS:  - Encourage pt to monitor pain and request assistance  - Assess pain using appropriate pain scale  - Administer analgesics based on type and severity of pain and evaluate response  - Implement non-pharmacological measures as appropriate and evaluate response  - Consider cultural and social influences on pain and pain management  - Manage/alleviate anxiety  - Utilize distraction and/or relaxation techniques  - Monitor for opioid side effects  - Notify MD/LIP if interventions unsuccessful or patient reports new pain  - Anticipate increased pain with activity and pre-medicate as appropriate  Outcome: Progressing     Problem: RISK FOR INFECTION - ADULT  Goal: Absence of fever/infection during anticipated neutropenic period  Description: INTERVENTIONS  - Monitor WBC  - Administer growth factors as ordered  - Implement neutropenic guidelines  Outcome: Progressing     Problem: SAFETY ADULT - FALL  Goal: Free from fall injury  Description: INTERVENTIONS:  - Assess pt frequently for physical needs  - Identify cognitive and physical deficits and behaviors that affect risk of falls.   - [place] fall precautions as indicated by assessment.  - Educate pt/family on patient safety including physical limitations  - Instruct pt to call for assistance with activity based on assessment  - Modify environment to reduce risk of injury  - Provide assistive devices as appropriate  - Consider OT/PT consult to assist with strengthening/mobility  - Encourage toileting schedule  Outcome: Progressing     Problem: DISCHARGE PLANNING  Goal: Discharge to home or other facility with appropriate resources  Description: INTERVENTIONS:  - Identify barriers to discharge w/pt and caregiver  - Include patient/family/discharge partner in discharge planning  - Arrange for needed discharge resources and transportation as appropriate  - Identify discharge learning needs (meds, wound care, etc)  - Arrange for interpreters to assist at discharge as needed  - Consider post-discharge preferences of patient/family/discharge partner  - Complete POLST form as appropriate  - Assess patient's ability to be responsible for managing their own health  - Refer to Case Management Department for coordinating discharge planning if the patient needs post-hospital services based on physician/LIP order or complex needs related to functional status, cognitive ability or social support system  Outcome: Progressing

## 2022-03-26 VITALS
SYSTOLIC BLOOD PRESSURE: 141 MMHG | WEIGHT: 170 LBS | HEART RATE: 50 BPM | HEIGHT: 62 IN | RESPIRATION RATE: 18 BRPM | TEMPERATURE: 98 F | BODY MASS INDEX: 31.28 KG/M2 | DIASTOLIC BLOOD PRESSURE: 63 MMHG | OXYGEN SATURATION: 98 %

## 2022-03-26 LAB — POTASSIUM SERPL-SCNC: 3.6 MMOL/L (ref 3.5–5.1)

## 2022-03-26 PROCEDURE — 84132 ASSAY OF SERUM POTASSIUM: CPT | Performed by: HOSPITALIST

## 2022-03-26 PROCEDURE — C9113 INJ PANTOPRAZOLE SODIUM, VIA: HCPCS | Performed by: SURGERY

## 2022-03-26 RX ORDER — POLYETHYLENE GLYCOL 3350 17 G/17G
17 POWDER, FOR SOLUTION ORAL DAILY
Qty: 14 PACKET | Refills: 0 | Status: SHIPPED | OUTPATIENT
Start: 2022-03-26 | End: 2022-04-09

## 2022-03-26 RX ORDER — HYDROCODONE BITARTRATE AND ACETAMINOPHEN 5; 325 MG/1; MG/1
1 TABLET ORAL EVERY 6 HOURS PRN
Qty: 20 TABLET | Refills: 0 | Status: SHIPPED | OUTPATIENT
Start: 2022-03-26

## 2022-03-26 NOTE — PLAN OF CARE
Tolerating PO intake. Passing flatus, having bowel movements. No PRNs needed overnight. Wearing abdominal binder. Anticipated discharge home today. Problem: Patient Centered Care  Goal: Patient preferences are identified and integrated in the patient's plan of care  Description: Interventions:  - What would you like us to know as we care for you?  I had surgery!  - Provide timely, complete, and accurate information to patient/family  - Incorporate patient and family knowledge, values, beliefs, and cultural backgrounds into the planning and delivery of care  - Encourage patient/family to participate in care and decision-making at the level they choose  - Honor patient and family perspectives and choices  Outcome: Progressing     Problem: Patient/Family Goals  Goal: Patient/Family Long Term Goal  Description: Patient's Long Term Goal: go home    Interventions:  - monitor labs  - continue with plan of care  - See additional Care Plan goals for specific interventions  Outcome: Progressing  Goal: Patient/Family Short Term Goal  Description: Patient's Short Term Goal: have a regular bowel movement    Interventions:   - ambulation  - slow diet advancement  - See additional Care Plan goals for specific interventions  Outcome: Progressing     Problem: PAIN - ADULT  Goal: Verbalizes/displays adequate comfort level or patient's stated pain goal  Description: INTERVENTIONS:  - Encourage pt to monitor pain and request assistance  - Assess pain using appropriate pain scale  - Administer analgesics based on type and severity of pain and evaluate response  - Implement non-pharmacological measures as appropriate and evaluate response  - Consider cultural and social influences on pain and pain management  - Manage/alleviate anxiety  - Utilize distraction and/or relaxation techniques  - Monitor for opioid side effects  - Notify MD/LIP if interventions unsuccessful or patient reports new pain  - Anticipate increased pain with activity and pre-medicate as appropriate  Outcome: Progressing     Problem: RISK FOR INFECTION - ADULT  Goal: Absence of fever/infection during anticipated neutropenic period  Description: INTERVENTIONS  - Monitor WBC  - Administer growth factors as ordered  - Implement neutropenic guidelines  Outcome: Progressing     Problem: SAFETY ADULT - FALL  Goal: Free from fall injury  Description: INTERVENTIONS:  - Assess pt frequently for physical needs  - Identify cognitive and physical deficits and behaviors that affect risk of falls.   - [place] fall precautions as indicated by assessment.  - Educate pt/family on patient safety including physical limitations  - Instruct pt to call for assistance with activity based on assessment  - Modify environment to reduce risk of injury  - Provide assistive devices as appropriate  - Consider OT/PT consult to assist with strengthening/mobility  - Encourage toileting schedule  Outcome: Progressing     Problem: DISCHARGE PLANNING  Goal: Discharge to home or other facility with appropriate resources  Description: INTERVENTIONS:  - Identify barriers to discharge w/pt and caregiver  - Include patient/family/discharge partner in discharge planning  - Arrange for needed discharge resources and transportation as appropriate  - Identify discharge learning needs (meds, wound care, etc)  - Arrange for interpreters to assist at discharge as needed  - Consider post-discharge preferences of patient/family/discharge partner  - Complete POLST form as appropriate  - Assess patient's ability to be responsible for managing their own health  - Refer to Case Management Department for coordinating discharge planning if the patient needs post-hospital services based on physician/LIP order or complex needs related to functional status, cognitive ability or social support system  Outcome: Progressing

## 2022-03-26 NOTE — PLAN OF CARE
Mike Berrios is alert/oriented. Vitals stable. Passing gas, advanced to soft diet which she is tolerating well. Ambulating independently. IVF stopped. No complaints of pain. Heparin given for DVT prophylaxis. Voiding adequately. Plan of care reviewed with pt. Bed low, locked, all safety measures in place.     Problem: Patient Centered Care  Goal: Patient preferences are identified and integrated in the patient's plan of care  Description: Interventions:  - What would you like us to know as we care for you?   - Provide timely, complete, and accurate information to patient/family  - Incorporate patient and family knowledge, values, beliefs, and cultural backgrounds into the planning and delivery of care  - Encourage patient/family to participate in care and decision-making at the level they choose  - Honor patient and family perspectives and choices  Outcome: Progressing     Problem: Patient/Family Goals  Goal: Patient/Family Long Term Goal  Description: Patient's Long Term Goal:     Interventions:  -   - See additional Care Plan goals for specific interventions  Outcome: Progressing  Goal: Patient/Family Short Term Goal  Description: Patient's Short Term Goal:     Interventions:   -   - See additional Care Plan goals for specific interventions  Outcome: Progressing     Problem: PAIN - ADULT  Goal: Verbalizes/displays adequate comfort level or patient's stated pain goal  Description: INTERVENTIONS:  - Encourage pt to monitor pain and request assistance  - Assess pain using appropriate pain scale  - Administer analgesics based on type and severity of pain and evaluate response  - Implement non-pharmacological measures as appropriate and evaluate response  - Consider cultural and social influences on pain and pain management  - Manage/alleviate anxiety  - Utilize distraction and/or relaxation techniques  - Monitor for opioid side effects  - Notify MD/LIP if interventions unsuccessful or patient reports new pain  - Anticipate increased pain with activity and pre-medicate as appropriate  Outcome: Progressing     Problem: RISK FOR INFECTION - ADULT  Goal: Absence of fever/infection during anticipated neutropenic period  Description: INTERVENTIONS  - Monitor WBC  - Administer growth factors as ordered  - Implement neutropenic guidelines  Outcome: Progressing     Problem: SAFETY ADULT - FALL  Goal: Free from fall injury  Description: INTERVENTIONS:  - Assess pt frequently for physical needs  - Identify cognitive and physical deficits and behaviors that affect risk of falls.   - [place] fall precautions as indicated by assessment.  - Educate pt/family on patient safety including physical limitations  - Instruct pt to call for assistance with activity based on assessment  - Modify environment to reduce risk of injury  - Provide assistive devices as appropriate  - Consider OT/PT consult to assist with strengthening/mobility  - Encourage toileting schedule  Outcome: Progressing     Problem: DISCHARGE PLANNING  Goal: Discharge to home or other facility with appropriate resources  Description: INTERVENTIONS:  - Identify barriers to discharge w/pt and caregiver  - Include patient/family/discharge partner in discharge planning  - Arrange for needed discharge resources and transportation as appropriate  - Identify discharge learning needs (meds, wound care, etc)  - Arrange for interpreters to assist at discharge as needed  - Consider post-discharge preferences of patient/family/discharge partner  - Complete POLST form as appropriate  - Assess patient's ability to be responsible for managing their own health  - Refer to Case Management Department for coordinating discharge planning if the patient needs post-hospital services based on physician/LIP order or complex needs related to functional status, cognitive ability or social support system  Outcome: Progressing

## 2022-03-26 NOTE — PLAN OF CARE
Pt A/O x4. Tolerating diet. Oxy IR PRN for pain. Surgical incisions C/D/I. Abd binder in place. Up ad giselle, voiding. All dc information given; answered all questions. Fall precautions in place. Call light within reach. Calls appropriately. Frequent rounding. Will be going home today. Problem: Patient Centered Care  Goal: Patient preferences are identified and integrated in the patient's plan of care  Description: Interventions:  - What would you like us to know as we care for you?  I had surgery!  - Provide timely, complete, and accurate information to patient/family  - Incorporate patient and family knowledge, values, beliefs, and cultural backgrounds into the planning and delivery of care  - Encourage patient/family to participate in care and decision-making at the level they choose  - Honor patient and family perspectives and choices  Outcome: Adequate for Discharge     Problem: Patient/Family Goals  Goal: Patient/Family Long Term Goal  Description: Patient's Long Term Goal: go home    Interventions:  - monitor labs  - continue with plan of care  - See additional Care Plan goals for specific interventions  Outcome: Adequate for Discharge  Goal: Patient/Family Short Term Goal  Description: Patient's Short Term Goal: have a regular bowel movement    Interventions:   - ambulation  - slow diet advancement  - See additional Care Plan goals for specific interventions  Outcome: Adequate for Discharge     Problem: PAIN - ADULT  Goal: Verbalizes/displays adequate comfort level or patient's stated pain goal  Description: INTERVENTIONS:  - Encourage pt to monitor pain and request assistance  - Assess pain using appropriate pain scale  - Administer analgesics based on type and severity of pain and evaluate response  - Implement non-pharmacological measures as appropriate and evaluate response  - Consider cultural and social influences on pain and pain management  - Manage/alleviate anxiety  - Utilize distraction and/or relaxation techniques  - Monitor for opioid side effects  - Notify MD/LIP if interventions unsuccessful or patient reports new pain  - Anticipate increased pain with activity and pre-medicate as appropriate  Outcome: Adequate for Discharge     Problem: RISK FOR INFECTION - ADULT  Goal: Absence of fever/infection during anticipated neutropenic period  Description: INTERVENTIONS  - Monitor WBC  - Administer growth factors as ordered  - Implement neutropenic guidelines  Outcome: Adequate for Discharge     Problem: SAFETY ADULT - FALL  Goal: Free from fall injury  Description: INTERVENTIONS:  - Assess pt frequently for physical needs  - Identify cognitive and physical deficits and behaviors that affect risk of falls.   - Isabel fall precautions as indicated by assessment.  - Educate pt/family on patient safety including physical limitations  - Instruct pt to call for assistance with activity based on assessment  - Modify environment to reduce risk of injury  - Provide assistive devices as appropriate  - Consider OT/PT consult to assist with strengthening/mobility  - Encourage toileting schedule  Outcome: Adequate for Discharge     Problem: DISCHARGE PLANNING  Goal: Discharge to home or other facility with appropriate resources  Description: INTERVENTIONS:  - Identify barriers to discharge w/pt and caregiver  - Include patient/family/discharge partner in discharge planning  - Arrange for needed discharge resources and transportation as appropriate  - Identify discharge learning needs (meds, wound care, etc)  - Arrange for interpreters to assist at discharge as needed  - Consider post-discharge preferences of patient/family/discharge partner  - Complete POLST form as appropriate  - Assess patient's ability to be responsible for managing their own health  - Refer to Case Management Department for coordinating discharge planning if the patient needs post-hospital services based on physician/LIP order or complex needs related to functional status, cognitive ability or social support system  Outcome: Adequate for Discharge

## 2022-04-12 ENCOUNTER — OFFICE VISIT (OUTPATIENT)
Dept: SURGERY | Facility: CLINIC | Age: 67
End: 2022-04-12
Payer: COMMERCIAL

## 2022-04-12 ENCOUNTER — TELEMEDICINE (OUTPATIENT)
Dept: FAMILY MEDICINE CLINIC | Facility: CLINIC | Age: 67
End: 2022-04-12
Payer: COMMERCIAL

## 2022-04-12 VITALS — DIASTOLIC BLOOD PRESSURE: 68 MMHG | SYSTOLIC BLOOD PRESSURE: 153 MMHG | HEART RATE: 61 BPM

## 2022-04-12 VITALS — BODY MASS INDEX: 31 KG/M2 | WEIGHT: 170 LBS

## 2022-04-12 DIAGNOSIS — Z12.31 SCREENING MAMMOGRAM, ENCOUNTER FOR: ICD-10-CM

## 2022-04-12 DIAGNOSIS — E87.6 HYPOKALEMIA: ICD-10-CM

## 2022-04-12 DIAGNOSIS — I10 ESSENTIAL HYPERTENSION: ICD-10-CM

## 2022-04-12 DIAGNOSIS — C18.9 COLON ADENOCARCINOMA (HCC): Primary | ICD-10-CM

## 2022-04-12 DIAGNOSIS — C18.7 MALIGNANT NEOPLASM OF SIGMOID COLON (HCC): Primary | ICD-10-CM

## 2022-04-12 PROCEDURE — 99024 POSTOP FOLLOW-UP VISIT: CPT | Performed by: SURGERY

## 2022-04-12 PROCEDURE — 99213 OFFICE O/P EST LOW 20 MIN: CPT | Performed by: FAMILY MEDICINE

## 2022-04-12 PROCEDURE — 1111F DSCHRG MED/CURRENT MED MERGE: CPT | Performed by: SURGERY

## 2022-04-12 PROCEDURE — 3078F DIAST BP <80 MM HG: CPT | Performed by: FAMILY MEDICINE

## 2022-04-12 PROCEDURE — 3077F SYST BP >= 140 MM HG: CPT | Performed by: FAMILY MEDICINE

## 2022-04-14 ENCOUNTER — TELEPHONE (OUTPATIENT)
Dept: OBGYN CLINIC | Facility: CLINIC | Age: 67
End: 2022-04-14

## 2022-04-14 NOTE — TELEPHONE ENCOUNTER
Called patient to confirm her insurance information- She said she will be starting the process for Medicare next week. Only coverage is mutual of [employer] at the moment.

## 2022-05-09 ENCOUNTER — HOSPITAL ENCOUNTER (OUTPATIENT)
Dept: MAMMOGRAPHY | Facility: HOSPITAL | Age: 67
Discharge: HOME | End: 2022-05-09
Attending: FAMILY MEDICINE
Payer: MEDICARE

## 2022-05-09 DIAGNOSIS — Z12.31 SCREENING MAMMOGRAM, ENCOUNTER FOR: ICD-10-CM

## 2022-05-09 PROCEDURE — 77067 SCR MAMMO BI INCL CAD: CPT | Performed by: FAMILY MEDICINE

## 2022-05-09 PROCEDURE — 77063 BREAST TOMOSYNTHESIS BI: CPT | Performed by: FAMILY MEDICINE

## 2022-05-19 ENCOUNTER — HOSPITAL ENCOUNTER (OUTPATIENT)
Dept: CT IMAGING | Facility: HOSPITAL | Age: 67
Discharge: HOME | End: 2022-05-19
Attending: UROLOGY
Payer: MEDICARE

## 2022-05-19 LAB — CREAT BLD-MCNC: 1 MG/DL

## 2022-05-19 PROCEDURE — 82565 ASSAY OF CREATININE: CPT

## 2022-05-19 PROCEDURE — 74170 CT ABD WO CNTRST FLWD CNTRST: CPT | Performed by: UROLOGY

## 2022-05-25 ENCOUNTER — OFFICE VISIT (OUTPATIENT)
Dept: SURGERY | Facility: CLINIC | Age: 67
End: 2022-05-25
Payer: MEDICARE

## 2022-05-25 DIAGNOSIS — C64.1 RENAL CELL CARCINOMA OF RIGHT KIDNEY (HCC): Primary | ICD-10-CM

## 2022-05-25 DIAGNOSIS — Z91.041 CONTRAST MEDIA ALLERGY: ICD-10-CM

## 2022-05-25 PROCEDURE — 99213 OFFICE O/P EST LOW 20 MIN: CPT | Performed by: UROLOGY

## 2022-05-25 RX ORDER — PREDNISONE 50 MG/1
TABLET ORAL
Qty: 3 TABLET | Refills: 0 | Status: SHIPPED | OUTPATIENT
Start: 2022-05-25

## 2022-06-07 ENCOUNTER — TELEPHONE (OUTPATIENT)
Dept: FAMILY MEDICINE CLINIC | Facility: CLINIC | Age: 67
End: 2022-06-07

## 2022-06-11 RX ORDER — AMLODIPINE BESYLATE 10 MG/1
10 TABLET ORAL DAILY
Qty: 90 TABLET | Refills: 0 | Status: SHIPPED | OUTPATIENT
Start: 2022-06-11

## 2022-06-11 NOTE — TELEPHONE ENCOUNTER
Please assist patient with making an appointment to receive further refills. Thank you. Refill passed per [employer] protocol.    Requested Prescriptions   Pending Prescriptions Disp Refills    AMLODIPINE 10 MG Oral Tab [Pharmacy Med Name: AMLODIPINE BESYLATE 10MG TABLETS] 90 tablet 0     Sig: TAKE 1 TABLET(10 MG) BY MOUTH DAILY        Hypertensive Medications Protocol Failed - 6/7/2022  4:50 PM        Failed - Appointment in past 6 or next 3 months        Passed - CMP or BMP in past 12 months        Passed - GFR  > 50     Lab Results   Component Value Date    GFRAA 68 05/19/2022                        Recent Outpatient Visits              2 weeks ago Renal cell carcinoma of right kidney [facility])    [facility], Shanel Olivo MD    Office Visit    2 months ago Colon adenocarcinoma [facility])    [address] [place] [address]    Telemedicine    2 months ago Malignant neoplasm of sigmoid colon [facility])    [facility] Surgery Kyree Siegel MD    Office Visit    4 months ago Adenomatous polyp of sigmoid colon    [facility] Surgery Kyree Siegel MD    Office Visit    4 months ago Renal cell carcinoma of right kidney [facility])    [facility], [address], Caridad Espitia MD    Office Visit            Future Appointments         Provider Department Appt Notes    In 3 days Mohan Perrin 19 Hematology Oncology consult Malignant neoplasm of sigmoid colon dr Pete awan ref

## 2022-06-13 NOTE — TELEPHONE ENCOUNTER
[address] [facility] office for DR Ellie Borrero: please reach out to patient for f/u appt. Last televisit 4/12/22; Patient was to f/u around end of May for Physical and bp check.

## 2022-06-14 ENCOUNTER — LAB ENCOUNTER (OUTPATIENT)
Dept: LAB | Facility: HOSPITAL | Age: 67
End: 2022-06-14
Attending: INTERNAL MEDICINE
Payer: MEDICARE

## 2022-06-14 ENCOUNTER — TELEPHONE (OUTPATIENT)
Dept: GASTROENTEROLOGY | Facility: CLINIC | Age: 67
End: 2022-06-14

## 2022-06-14 ENCOUNTER — OFFICE VISIT (OUTPATIENT)
Dept: HEMATOLOGY/ONCOLOGY | Facility: HOSPITAL | Age: 67
End: 2022-06-14
Attending: SURGERY
Payer: MEDICARE

## 2022-06-14 VITALS
WEIGHT: 170 LBS | OXYGEN SATURATION: 100 % | DIASTOLIC BLOOD PRESSURE: 60 MMHG | SYSTOLIC BLOOD PRESSURE: 168 MMHG | BODY MASS INDEX: 31.28 KG/M2 | HEIGHT: 62 IN | HEART RATE: 66 BPM | RESPIRATION RATE: 16 BRPM

## 2022-06-14 DIAGNOSIS — C18.7 CANCER OF SIGMOID COLON (HCC): Primary | ICD-10-CM

## 2022-06-14 DIAGNOSIS — C64.1 RENAL CELL CARCINOMA OF RIGHT KIDNEY (HCC): ICD-10-CM

## 2022-06-14 DIAGNOSIS — D64.9 NORMOCYTIC ANEMIA: ICD-10-CM

## 2022-06-14 LAB
BASOPHILS # BLD AUTO: 0.02 X10(3) UL (ref 0–0.2)
BASOPHILS NFR BLD AUTO: 0.3 %
DEPRECATED RDW RBC AUTO: 42.3 FL (ref 35.1–46.3)
EOSINOPHIL # BLD AUTO: 0.04 X10(3) UL (ref 0–0.7)
EOSINOPHIL NFR BLD AUTO: 0.6 %
ERYTHROCYTE [DISTWIDTH] IN BLOOD BY AUTOMATED COUNT: 12.2 % (ref 11–15)
HCT VFR BLD AUTO: 39.2 %
HGB BLD-MCNC: 12.6 G/DL
IMM GRANULOCYTES # BLD AUTO: 0.03 X10(3) UL (ref 0–1)
IMM GRANULOCYTES NFR BLD: 0.5 %
LYMPHOCYTES # BLD AUTO: 1.82 X10(3) UL (ref 1–4)
LYMPHOCYTES NFR BLD AUTO: 27.6 %
MCH RBC QN AUTO: 30 PG (ref 26–34)
MCHC RBC AUTO-ENTMCNC: 32.1 G/DL (ref 31–37)
MCV RBC AUTO: 93.3 FL
MONOCYTES # BLD AUTO: 0.57 X10(3) UL (ref 0.1–1)
MONOCYTES NFR BLD AUTO: 8.6 %
NEUTROPHILS # BLD AUTO: 4.12 X10 (3) UL (ref 1.5–7.7)
NEUTROPHILS # BLD AUTO: 4.12 X10(3) UL (ref 1.5–7.7)
NEUTROPHILS NFR BLD AUTO: 62.4 %
PLATELET # BLD AUTO: 324 10(3)UL (ref 150–450)
RBC # BLD AUTO: 4.2 X10(6)UL
WBC # BLD AUTO: 6.6 X10(3) UL (ref 4–11)

## 2022-06-14 PROCEDURE — 99204 OFFICE O/P NEW MOD 45 MIN: CPT | Performed by: INTERNAL MEDICINE

## 2022-06-14 PROCEDURE — 36415 COLL VENOUS BLD VENIPUNCTURE: CPT

## 2022-06-14 PROCEDURE — 85025 COMPLETE CBC W/AUTO DIFF WBC: CPT

## 2022-06-22 ENCOUNTER — APPOINTMENT (OUTPATIENT)
Dept: HEMATOLOGY/ONCOLOGY | Facility: HOSPITAL | Age: 67
End: 2022-06-22
Attending: INTERNAL MEDICINE
Payer: MEDICARE

## 2022-06-22 ENCOUNTER — GENETICS ENCOUNTER (OUTPATIENT)
Dept: GENETICS | Facility: HOSPITAL | Age: 67
End: 2022-06-22
Attending: INTERNAL MEDICINE
Payer: MEDICARE

## 2022-06-22 DIAGNOSIS — C64.1 RENAL CELL CARCINOMA OF RIGHT KIDNEY (HCC): Primary | ICD-10-CM

## 2022-06-22 DIAGNOSIS — Z85.038 PERSONAL HISTORY OF COLON CANCER: ICD-10-CM

## 2022-06-22 PROCEDURE — 96040 HC GENETIC COUNSELING EA 30 MIN: CPT | Performed by: GENETIC COUNSELOR, MS

## 2022-07-05 ENCOUNTER — OFFICE VISIT (OUTPATIENT)
Dept: HEMATOLOGY/ONCOLOGY | Facility: HOSPITAL | Age: 67
End: 2022-07-05
Attending: NURSE PRACTITIONER
Payer: MEDICARE

## 2022-07-05 DIAGNOSIS — C18.7 CANCER OF SIGMOID COLON (HCC): Primary | ICD-10-CM

## 2022-07-05 DIAGNOSIS — Z71.9 COUNSELING, UNSPECIFIED: ICD-10-CM

## 2022-07-05 DIAGNOSIS — Z08 ENCOUNTER FOR FOLLOW-UP EXAMINATION AFTER COMPLETED TREATMENT FOR MALIGNANT NEOPLASM: ICD-10-CM

## 2022-07-05 DIAGNOSIS — C64.1 RENAL CELL CARCINOMA OF RIGHT KIDNEY (HCC): ICD-10-CM

## 2022-07-05 PROCEDURE — 99215 OFFICE O/P EST HI 40 MIN: CPT | Performed by: NURSE PRACTITIONER

## 2022-07-05 NOTE — PROGRESS NOTES
I met with Paul Yanez for a Survivorship Clinic visit to provide a survivorship care plan (SCP) and information related to post-treatment care. Paul Yanez had a history of sigmoid polypectomy in 2012. She had a colonoscopy with Dr. Ceferino Alicea in 8/2021 which revealed a large soft polypoid mass occupying about 50% of the luminal space at 25-30 cm from entry. Biopsy was negative for malignancy. A CT scan was done and showed a sigmoid colonic lesion and a 4.4 cm exophytic right upper pole renal mass concerning for renal cell carcinoma. CT chest imaging noted a 1.2 cm lesion in the medial aspect of the left clavicle, noted likely to represent a non-aggressive osseous lesion with no other evidence of metastatic disease in the chest.  A bone scan was done on 11/5/2021 that showed no evidence of metastatic disease. On 11/15/2021 she had a flexible sigmoidoscopy with Dr. Celia Ambrose and the colon mass was 50-75% of the luminal space and the biopsy was negative for malignancy. It was not amenable to endoscopic removal.  She then underwent a robotic assisted laparoscopic right partial nephrectomy on 11/23/2021 with Dr. Genevive Leventhal, that showed a right papillary renal cell carcinoma, pT1b, pNx, cM0, grade 3. On 3/23/2022 she had a laparoscopic sigmoidectomy with colorectal anastomoses with Dr. Raghav Samuel. 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 No further treatment was recommended for either cancer. Repeat CT surveillance was done on 5/19/2022 and was negative for recurrence. (See Oncology Treatment Summary SCP for details). Current condition/issues: Paul Yanez reports that she has recovered well from both surgeries and is back to her usual activities. 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 She has no symptoms after either surgery. She currently exercises using videos and walks for 30\" each day. She admits to needing improvements in her diet. Current BMI is 31. She denies anxiety or depression and reports having had good support through her treatment. Survivorship Care Plan and letter: She was provided a hard copy of the SCP and a letter that outlined the purpose of the visit and plan. We reviewed all elements of both documents. Reviewed Cancer Surveillance: Colon cancer surveillance will be done by Dr. Gale Villeda and Dr. Mg David and will include: office visits and colonoscopy. She is due to see Dr. Robert Price in September for office visit and she plans for colonoscopy with Dr. Bernardo Martin in September. We reviewed other considerations for colon cancer prevention with Vitamin D3 2000IU/day and Aspirin 325mg/day. She will discuss this with Dr. Robert Price. Renal cancer surveillance will be done by Dr. Anamika Amezcua and will include office visits, abdominal CT, chest x-ray and bloodwork. She is due for imaging and labs in 5/2023 prior to an office visit with Dr. Trini Rogers in 5/2023. Reviewed Schedule of other clinic visits with Primary Care and specialists: Dr. Robert Price will continue to manage all general health care recommended for age and gender including cancer screening tests. Reviewed concerning symptoms that she should report to any Provider. Reviewed possible late and long-term effects related to the treatment that was received. Reviewed common cancer survivor issues and resources available. Various survivorship resources were provided to use going forward as needed (see below). Reviewed Lifestyle/behaviors that can affect ongoing health, including the risk for the cancer coming back or developing another cancer. We reviewed importance of physical activity, weight bearing exercise, strength training 2-3x/week and a plant based diet. We reviewed weight loss options.   We reviewed skin care and sun krzysztof. Emily received a take-home folder that included the following survivorship resources:   -SCP and patient letter  - [facility] in [place] - includes: nutrition and exercise classes, mind/body programs, education, support groups  -[facility] Resources in [facility]: education, support groups, special programs, nutrition and exercise classes, movement classes, mind/body programs, survivorship programs, individual counseling  -[employer] [facility] -education, support groups, special programs, nutrition and exercise classes, movement classes, mind/body programs,   -'s [employer] & other things you bakee - 1:1 support  -[place] [employer] Loss Program  -Jump Start Lifestyle Program  -[employer] handout-nutrition, physical activity  -ACS Cancer Screening Guidelines; ACS colorectal cancer, renal cancer  -Websites: [address] [facility] for your [address] [place] [address] was given the opportunity to ask questions. She had few questions and verbalized understanding of the information we discussed today. She is doing very well after both surgeries. My total time spent caring for the patient on the day of the encounter: 55 minutes (10 minutes reviewing chart, 35 minutes of face to face counseling regarding survivorship education, review of care plan and additional resources and 10 minutes of documentation). She was encouraged to call with any further questions.    KEISHA Fraga

## 2022-07-06 ENCOUNTER — OFFICE VISIT (OUTPATIENT)
Dept: GASTROENTEROLOGY | Facility: CLINIC | Age: 67
End: 2022-07-06
Payer: MEDICARE

## 2022-07-06 ENCOUNTER — TELEPHONE (OUTPATIENT)
Dept: GASTROENTEROLOGY | Facility: CLINIC | Age: 67
End: 2022-07-06

## 2022-07-06 VITALS
DIASTOLIC BLOOD PRESSURE: 74 MMHG | HEIGHT: 62 IN | WEIGHT: 172 LBS | SYSTOLIC BLOOD PRESSURE: 137 MMHG | BODY MASS INDEX: 31.65 KG/M2 | HEART RATE: 60 BPM

## 2022-07-06 DIAGNOSIS — C18.7 CANCER OF SIGMOID COLON (HCC): Primary | ICD-10-CM

## 2022-07-06 DIAGNOSIS — K59.00 CONSTIPATION, UNSPECIFIED CONSTIPATION TYPE: ICD-10-CM

## 2022-07-06 PROCEDURE — 99215 OFFICE O/P EST HI 40 MIN: CPT | Performed by: NURSE PRACTITIONER

## 2022-07-06 RX ORDER — POLYETHYLENE GLYCOL 3350, SODIUM CHLORIDE, SODIUM BICARBONATE, POTASSIUM CHLORIDE 420; 11.2; 5.72; 1.48 G/4L; G/4L; G/4L; G/4L
POWDER, FOR SOLUTION ORAL
Qty: 4000 ML | Refills: 0 | Status: SHIPPED | OUTPATIENT
Start: 2022-07-06

## 2022-07-06 NOTE — PATIENT INSTRUCTIONS
-miralax or prune juice  1. Schedule colonoscopy with [facility] w/ Dr. Radha Ring [Diagnosis: cancer of sigmoid cln]    2.  bowel prep from pharmacy (split trilyte)    3. Continue all medications for procedure    4. Read all bowel prep instructions carefully    5. AVOID seeds, nuts, popcorn, raw fruits and vegetables (cooked is okay) for 2-3 days before procedure    6. You will need to be tested for COVID within 72 hours of your procedure. You will be contacted with instructions on how to do this.       >>>Please note: if you were prescribed Suprep for the bowel prep and it is too expensive or not covered by insurance, it is okay to substitute Trilyte (or any similar generic prep). This can be done by notifying the pharmacy or calling our office.

## 2022-07-06 NOTE — TELEPHONE ENCOUNTER
Scheduled for:  Colonoscopy [address]28[address]  Provider Name:  Dr Barbie Mcadams  Date:  09/14/2022  Location:  [facility]  Sedation:  [facility]  Time:  11:30am ( pt is aware arrival time is at 10:30am)  Prep:  Trilyte   Meds/Allergies Reconciled?:  KEISHA Joy Reviewed   Diagnosis with codes:    Cancer of sigmoid colon ([facility] [address] C18.7  Was patient informed to call insurance with codes (Y/N):  Yes  Referral sent?:  Referral was sent at the time of electronic surgical scheduling. [address] [place] [address] or [facility] notified?:  I sent an electronic request to Endo Scheduling and received a confirmation today. Medication Orders:  Pt is aware to NOT take iron pills, herbal meds and diet supplements for 7 days before exam. Also to NOT take any form of alcohol, recreational drugs and any forms of ED meds 24 hours before exam.   Misc Orders:  Patient was informed that they will need a COVID 19 test prior to their procedure. Patient verbally understood & will await a phone call from [facility] to schedule. Further instructions given by staff:  I provide prep instructions to patient at the time of the appointment and reviewed date, time and location, she verbalized that she understood and is aware to call if she has any questions.

## 2022-07-07 RX ORDER — HYDROCHLOROTHIAZIDE 25 MG/1
25 TABLET ORAL DAILY
Qty: 90 TABLET | Refills: 1 | Status: SHIPPED | OUTPATIENT
Start: 2022-07-07

## 2022-07-07 RX ORDER — METOPROLOL SUCCINATE 50 MG/1
75 TABLET, EXTENDED RELEASE ORAL EVERY EVENING
Qty: 135 TABLET | Refills: 1 | Status: SHIPPED | OUTPATIENT
Start: 2022-07-07

## 2022-07-07 RX ORDER — PRAVASTATIN SODIUM 40 MG
40 TABLET ORAL NIGHTLY
Qty: 90 TABLET | Refills: 1 | Status: SHIPPED | OUTPATIENT
Start: 2022-07-07

## 2022-07-07 NOTE — TELEPHONE ENCOUNTER
Pt requesting refill for the following:    PRAVASTATIN 40 MG Oral Tab    HYDROCHLOROTHIAZIDE 25 MG Oral Tab    Metoprolol Succinate ER 50 MG Oral Tablet 24 Hr    [id number] [address] AT [facility] OF [address], [phone], [phone]

## 2022-07-26 ENCOUNTER — OFFICE VISIT (OUTPATIENT)
Dept: FAMILY MEDICINE CLINIC | Facility: CLINIC | Age: 67
End: 2022-07-26
Payer: MEDICARE

## 2022-07-26 VITALS
OXYGEN SATURATION: 98 % | DIASTOLIC BLOOD PRESSURE: 60 MMHG | SYSTOLIC BLOOD PRESSURE: 132 MMHG | WEIGHT: 171 LBS | HEIGHT: 62 IN | BODY MASS INDEX: 31.47 KG/M2 | HEART RATE: 71 BPM

## 2022-07-26 DIAGNOSIS — Z85.038 HISTORY OF COLON CANCER: ICD-10-CM

## 2022-07-26 DIAGNOSIS — I10 ESSENTIAL HYPERTENSION: ICD-10-CM

## 2022-07-26 DIAGNOSIS — Z23 IMMUNIZATION DUE: ICD-10-CM

## 2022-07-26 DIAGNOSIS — E78.5 HYPERLIPIDEMIA, UNSPECIFIED HYPERLIPIDEMIA TYPE: ICD-10-CM

## 2022-07-26 DIAGNOSIS — Z00.00 ENCOUNTER FOR MEDICARE ANNUAL WELLNESS EXAM: Primary | ICD-10-CM

## 2022-07-26 DIAGNOSIS — H61.23 BILATERAL IMPACTED CERUMEN: ICD-10-CM

## 2022-07-26 DIAGNOSIS — Z85.528 HISTORY OF RENAL CELL CARCINOMA: ICD-10-CM

## 2022-07-26 PROCEDURE — 69210 REMOVE IMPACTED EAR WAX UNI: CPT | Performed by: FAMILY MEDICINE

## 2022-07-26 PROCEDURE — G0402 INITIAL PREVENTIVE EXAM: HCPCS | Performed by: FAMILY MEDICINE

## 2022-07-26 PROCEDURE — 90677 PCV20 VACCINE IM: CPT | Performed by: FAMILY MEDICINE

## 2022-07-26 PROCEDURE — G0009 ADMIN PNEUMOCOCCAL VACCINE: HCPCS | Performed by: FAMILY MEDICINE

## 2022-08-01 ENCOUNTER — LAB ENCOUNTER (OUTPATIENT)
Dept: LAB | Facility: REFERENCE LAB | Age: 67
End: 2022-08-01
Attending: FAMILY MEDICINE
Payer: MEDICARE

## 2022-08-01 DIAGNOSIS — E78.5 HYPERLIPIDEMIA, UNSPECIFIED HYPERLIPIDEMIA TYPE: ICD-10-CM

## 2022-08-01 DIAGNOSIS — Z00.00 ENCOUNTER FOR MEDICARE ANNUAL WELLNESS EXAM: ICD-10-CM

## 2022-08-01 LAB
ALBUMIN SERPL-MCNC: 3.8 G/DL (ref 3.4–5)
ALBUMIN/GLOB SERPL: 1 {RATIO} (ref 1–2)
ALP LIVER SERPL-CCNC: 68 U/L
ALT SERPL-CCNC: 18 U/L
ANION GAP SERPL CALC-SCNC: 5 MMOL/L (ref 0–18)
AST SERPL-CCNC: 14 U/L (ref 15–37)
BILIRUB SERPL-MCNC: 0.4 MG/DL (ref 0.1–2)
BUN BLD-MCNC: 12 MG/DL (ref 7–18)
BUN/CREAT SERPL: 11.5 (ref 10–20)
CALCIUM BLD-MCNC: 9.6 MG/DL (ref 8.5–10.1)
CHLORIDE SERPL-SCNC: 109 MMOL/L (ref 98–112)
CHOLEST SERPL-MCNC: 138 MG/DL (ref ?–200)
CO2 SERPL-SCNC: 28 MMOL/L (ref 21–32)
CREAT BLD-MCNC: 1.04 MG/DL
CREAT UR-SCNC: 262 MG/DL
FASTING PATIENT LIPID ANSWER: YES
FASTING STATUS PATIENT QL REPORTED: YES
GLOBULIN PLAS-MCNC: 3.9 G/DL (ref 2.8–4.4)
GLUCOSE BLD-MCNC: 99 MG/DL (ref 70–99)
HDLC SERPL-MCNC: 43 MG/DL (ref 40–59)
LDLC SERPL CALC-MCNC: 78 MG/DL (ref ?–100)
MICROALBUMIN UR-MCNC: 1.3 MG/DL
MICROALBUMIN/CREAT 24H UR-RTO: 5 UG/MG (ref ?–30)
NONHDLC SERPL-MCNC: 95 MG/DL (ref ?–130)
OSMOLALITY SERPL CALC.SUM OF ELEC: 294 MOSM/KG (ref 275–295)
POTASSIUM SERPL-SCNC: 4 MMOL/L (ref 3.5–5.1)
PROT SERPL-MCNC: 7.7 G/DL (ref 6.4–8.2)
SODIUM SERPL-SCNC: 142 MMOL/L (ref 136–145)
TRIGL SERPL-MCNC: 91 MG/DL (ref 30–149)
VLDLC SERPL CALC-MCNC: 14 MG/DL (ref 0–30)

## 2022-08-01 PROCEDURE — 82043 UR ALBUMIN QUANTITATIVE: CPT

## 2022-08-01 PROCEDURE — 80053 COMPREHEN METABOLIC PANEL: CPT

## 2022-08-01 PROCEDURE — 82570 ASSAY OF URINE CREATININE: CPT

## 2022-08-01 PROCEDURE — 36415 COLL VENOUS BLD VENIPUNCTURE: CPT

## 2022-08-01 PROCEDURE — 80061 LIPID PANEL: CPT

## 2022-09-01 ENCOUNTER — TELEPHONE (OUTPATIENT)
Dept: FAMILY MEDICINE CLINIC | Facility: CLINIC | Age: 67
End: 2022-09-01

## 2022-09-01 NOTE — TELEPHONE ENCOUNTER
Called patient back she states she already called the hospital and they clarified she is getting a colonoscopy.

## 2022-09-01 NOTE — TELEPHONE ENCOUNTER
Pt called and wants to know what is she getting on 09/14 stated she thought it was a Colonoscopy but it says something different in [address] Pt thought I had a attitude with her because I asked what does [facility] but I did not was trying to help.

## 2022-09-15 ENCOUNTER — APPOINTMENT (OUTPATIENT)
Dept: HEMATOLOGY/ONCOLOGY | Facility: HOSPITAL | Age: 67
End: 2022-09-15
Attending: INTERNAL MEDICINE
Payer: MEDICARE

## 2022-10-19 NOTE — PAT NURSING NOTE
Verified with patient that procedure will be performed at [facility] and not at [facility], address provided. Patient verbalized understanding and agreed. Patient currently denies any COVID symptoms. No COVID test needed. Patient instructed to call GI office if any new COVID symptoms or contacts to [place] before procedure.

## 2022-10-20 ENCOUNTER — ANESTHESIA EVENT (OUTPATIENT)
Dept: ENDOSCOPY | Age: 67
End: 2022-10-20
Payer: MEDICARE

## 2022-10-20 ENCOUNTER — HOSPITAL ENCOUNTER (OUTPATIENT)
Age: 67
Setting detail: HOSPITAL OUTPATIENT SURGERY
Discharge: HOME | End: 2022-10-20
Attending: INTERNAL MEDICINE | Admitting: INTERNAL MEDICINE
Payer: MEDICARE

## 2022-10-20 ENCOUNTER — ANESTHESIA (OUTPATIENT)
Dept: ENDOSCOPY | Age: 67
End: 2022-10-20
Payer: MEDICARE

## 2022-10-20 VITALS
OXYGEN SATURATION: 98 % | HEIGHT: 62 IN | TEMPERATURE: 98 F | DIASTOLIC BLOOD PRESSURE: 65 MMHG | SYSTOLIC BLOOD PRESSURE: 161 MMHG | HEART RATE: 66 BPM | RESPIRATION RATE: 13 BRPM | WEIGHT: 170 LBS | BODY MASS INDEX: 31.28 KG/M2

## 2022-10-20 DIAGNOSIS — C18.7 CANCER OF SIGMOID COLON (HCC): ICD-10-CM

## 2022-10-20 PROCEDURE — 45380 COLONOSCOPY AND BIOPSY: CPT | Performed by: INTERNAL MEDICINE

## 2022-10-20 PROCEDURE — 99070 SPECIAL SUPPLIES PHYS/QHP: CPT | Performed by: INTERNAL MEDICINE

## 2022-10-20 RX ORDER — NALOXONE HYDROCHLORIDE 0.4 MG/ML
80 INJECTION, SOLUTION INTRAMUSCULAR; INTRAVENOUS; SUBCUTANEOUS AS NEEDED
OUTPATIENT
Start: 2022-10-20 | End: 2022-10-20

## 2022-10-20 RX ORDER — SODIUM CHLORIDE, SODIUM LACTATE, POTASSIUM CHLORIDE, CALCIUM CHLORIDE 600; 310; 30; 20 MG/100ML; MG/100ML; MG/100ML; MG/100ML
INJECTION, SOLUTION INTRAVENOUS CONTINUOUS
OUTPATIENT
Start: 2022-10-20

## 2022-10-20 RX ORDER — SODIUM CHLORIDE, SODIUM LACTATE, POTASSIUM CHLORIDE, CALCIUM CHLORIDE 600; 310; 30; 20 MG/100ML; MG/100ML; MG/100ML; MG/100ML
INJECTION, SOLUTION INTRAVENOUS CONTINUOUS
Status: DISCONTINUED | OUTPATIENT
Start: 2022-10-20 | End: 2022-10-20

## 2022-10-20 RX ORDER — LIDOCAINE HYDROCHLORIDE 10 MG/ML
INJECTION, SOLUTION EPIDURAL; INFILTRATION; INTRACAUDAL; PERINEURAL AS NEEDED
Status: DISCONTINUED | OUTPATIENT
Start: 2022-10-20 | End: 2022-10-20 | Stop reason: SURG

## 2022-10-20 RX ADMIN — SODIUM CHLORIDE, SODIUM LACTATE, POTASSIUM CHLORIDE, CALCIUM CHLORIDE: 600; 310; 30; 20 INJECTION, SOLUTION INTRAVENOUS at 13:46:00

## 2022-10-20 RX ADMIN — LIDOCAINE HYDROCHLORIDE 50 MG: 10 INJECTION, SOLUTION EPIDURAL; INFILTRATION; INTRACAUDAL; PERINEURAL at 13:48:00

## 2022-10-20 RX ADMIN — SODIUM CHLORIDE, SODIUM LACTATE, POTASSIUM CHLORIDE, CALCIUM CHLORIDE: 600; 310; 30; 20 INJECTION, SOLUTION INTRAVENOUS at 14:12:00

## 2022-10-20 NOTE — TELEPHONE ENCOUNTER
Contacted patient and confirmed appt for 7/6/22 at 1:30pm with Artie Esquivel at [facility]. Location/date/time details provided.     Your appointments     Date & Time Appointment Department [facility])        Jul 06, 2022  1:30 PM CDT Follow Up Visit with Michelle Reaves, [address] [place] [address] ([address])
Patient with Stage 1 colon cancer. Needs f/u in GI clinic and repeat CLN in August or Sep of this year. Dr. Damaso Garza to refer patient for genetic testing given hx of CRC and hx of RCC (stage 1). GI Staff:   Please call patient and have her see Ayde [facility]eves in [facility] Okay to use a add on spot. After seeing Ayde Reeves, she will schedule CLN with me during that visit.
[facility]

## 2022-10-21 NOTE — TELEPHONE ENCOUNTER
Protocol failed or has No Protocol, please review  Requested Prescriptions   Pending Prescriptions Disp Refills    AMLODIPINE 10 MG Oral Tab [Pharmacy Med Name: AMLODIPINE BESYLATE 10MG TABLETS] 90 tablet 0     Sig: TAKE 1 TABLET(10 MG) BY MOUTH DAILY        Hypertensive Medications Protocol Failed - 10/21/2022  1:12 PM        Failed - Last BP reading less than 140/90     BP Readings from Last 1 Encounters:  10/20/22 : (!) 161/65                Passed - In person appointment in the past 12 or next 3 months       Recent Outpatient Visits              2 months ago Encounter for Carmen Randal annual wellness exam    [address] [facility], [address] [place] [address]    Office Visit    3 months ago Cancer of sigmoid colon [facility])    [facility], [facility], [facility] [address], Alissa Daily APRN    Office Visit    3 months ago Cancer of sigmoid colon [facility])    [facility] Hematology Oncology Judy Ford APRN    Office Visit    4 months ago Cancer of sigmoid colon [facility])    [facility] Hematology Oncology Lindsay Garcia MD    Office Visit    4 months ago Renal cell carcinoma of right kidney [facility])    [facility], [address], Cedric Mejia MD    Office Visit                 Passed - CMP or BMP in past 6 months     Recent Results (from the past 4392 hour(s))   COMP METABOLIC PANEL (14)    Collection Time: 08/01/22 12:53 PM   Result Value Ref Range    Glucose 99 70 - 99 mg/dL    Sodium 142 136 - 145 mmol/L    Potassium 4.0 3.5 - 5.1 mmol/L    Chloride 109 98 - 112 mmol/L    CO2 28.0 21.0 - 32.0 mmol/L    Anion Gap 5 0 - 18 mmol/L    BUN 12 7 - 18 mg/dL    Creatinine 1.04 (H) 0.55 - 1.02 mg/dL    BUN/CREA Ratio 11.5 10.0 - 20.0    Calcium, Total 9.6 8.5 - 10.1 mg/dL    Calculated Osmolality 294 275 - 295 mOsm/kg    GFR, Non- 57 (L) >=60    GFR, -American 65 >=60    ALT 18 13 - 56 U/L    AST 14 (L) 15 - 37 U/L    Alkaline Phosphatase 68 50 - 130 U/L    Bilirubin, Total 0.4 0.1 - 2.0 mg/dL    Total Protein 7.7 6.4 - 8.2 g/dL    Albumin 3.8 3.4 - 5.0 g/dL    Globulin  3.9 2.8 - 4.4 g/dL    A/G Ratio 1.0 1.0 - 2.0    Patient Fasting for CMP? Yes      *Note: Due to a large number of results and/or encounters for the requested time period, some results have not been displayed. A complete set of results can be found in Results Review.                  Passed - In person appointment or virtual visit in the past 6 months       Recent Outpatient Visits              2 months ago Encounter for [employer] annual wellness exam    [address] [facility], [address] [place] [address]    Office Visit    3 months ago Cancer of [facility] colon [facility])    [facility], [address], [facility], APRN    Office Visit    3 months ago Cancer of [facility])    [facility] Hematology Oncology KEISHA Rosa    Office Visit    4 months ago Cancer of sigmoid colon [facility])    [facility] Hematology Oncology Sintia Cruz MD    Office Visit    4 months ago Renal cell carcinoma of right kidney [facility])    [facility], [address] [facility], Wallace Ruggiero MD    Office Visit                 Passed - EGFRCR or GFRAA > 50     GFR Evaluation  GFRAA: 72 , resulted on 8/1/2022                  Recent Outpatient Visits              2 months ago Encounter for [employer] annual wellness exam    [address] [facility], [facility] [place], [place]    Office Visit    3 months ago Cancer of sigmoid colon [facility])    [place] [facility] [facility], [address], [facility], APRN    Office Visit    3 months ago Cancer of [facility] colon [facility])    [facility] Hematology Oncology KEISHA Rosa    Office Visit    4 months ago Cancer of [facility])    [facility] Hematology Oncology Sintia Cruz MD    Office Visit    4 months ago Renal cell carcinoma of right kidney [facility])    [facility] Aleida Ramon MD    Office Visit

## 2022-10-24 RX ORDER — AMLODIPINE BESYLATE 10 MG/1
10 TABLET ORAL DAILY
Qty: 90 TABLET | Refills: 1 | Status: SHIPPED | OUTPATIENT
Start: 2022-10-24

## 2022-10-24 NOTE — TELEPHONE ENCOUNTER
Please check in with patient to see if her blood pressure has been well controlled since her high reading prior to her colonoscopy. Can schedule a nurse visit for BP check as well to be sure it is better.

## 2022-12-08 ENCOUNTER — TELEPHONE (OUTPATIENT)
Facility: CLINIC | Age: 67
End: 2022-12-08

## 2022-12-08 NOTE — TELEPHONE ENCOUNTER
Health maintenance updated. Last colonoscopy done 10/20/22. 5 year recall placed into Pt Outreach, next due on 10/20/27 per Dr. Jasper Clarke.

## 2022-12-08 NOTE — TELEPHONE ENCOUNTER
----- Message from Thom Tsang MD sent at 12/8/2022  7:31 AM CST -----  GI staff: please place recall for colonoscopy in 5 years

## 2023-01-30 ENCOUNTER — NURSE ONLY (OUTPATIENT)
Facility: CLINIC | Age: 68
End: 2023-01-30
Payer: MEDICARE

## 2023-01-30 VITALS — SYSTOLIC BLOOD PRESSURE: 136 MMHG | DIASTOLIC BLOOD PRESSURE: 76 MMHG

## 2023-01-30 DIAGNOSIS — Z23 NEED FOR INFLUENZA VACCINATION: Primary | ICD-10-CM

## 2023-01-30 PROCEDURE — G0008 ADMIN INFLUENZA VIRUS VAC: HCPCS | Performed by: FAMILY MEDICINE

## 2023-01-30 PROCEDURE — 90686 IIV4 VACC NO PRSV 0.5 ML IM: CPT | Performed by: FAMILY MEDICINE

## 2023-02-16 ENCOUNTER — HOSPITAL ENCOUNTER (OUTPATIENT)
Age: 68
Discharge: HOME | End: 2023-02-16
Payer: MEDICARE

## 2023-02-16 VITALS
SYSTOLIC BLOOD PRESSURE: 146 MMHG | DIASTOLIC BLOOD PRESSURE: 48 MMHG | TEMPERATURE: 98 F | HEART RATE: 59 BPM | OXYGEN SATURATION: 100 % | RESPIRATION RATE: 18 BRPM

## 2023-02-16 DIAGNOSIS — G44.209 TENSION HEADACHE: Primary | ICD-10-CM

## 2023-02-16 PROCEDURE — 99213 OFFICE O/P EST LOW 20 MIN: CPT | Performed by: NURSE PRACTITIONER

## 2023-02-16 NOTE — ED INITIAL ASSESSMENT (HPI)
Pt states having HA and noticed her BP has been elevated she has had some sharp pain behind left eye. Pt denies chest pain or SOB.

## 2023-03-16 ENCOUNTER — OFFICE VISIT (OUTPATIENT)
Facility: CLINIC | Age: 68
End: 2023-03-16
Payer: MEDICARE

## 2023-03-16 VITALS
DIASTOLIC BLOOD PRESSURE: 70 MMHG | SYSTOLIC BLOOD PRESSURE: 150 MMHG | OXYGEN SATURATION: 98 % | WEIGHT: 160 LBS | HEART RATE: 64 BPM | BODY MASS INDEX: 29.44 KG/M2 | HEIGHT: 62 IN

## 2023-03-16 DIAGNOSIS — I10 ESSENTIAL HYPERTENSION: Primary | ICD-10-CM

## 2023-03-16 PROCEDURE — 99213 OFFICE O/P EST LOW 20 MIN: CPT | Performed by: FAMILY MEDICINE

## 2023-03-16 RX ORDER — AMLODIPINE BESYLATE 10 MG/1
10 TABLET ORAL NIGHTLY
Qty: 90 TABLET | Refills: 0 | COMMUNITY
Start: 2023-03-16

## 2023-03-20 RX ORDER — METOPROLOL SUCCINATE 50 MG/1
75 TABLET, EXTENDED RELEASE ORAL EVERY EVENING
Qty: 135 TABLET | Refills: 0 | Status: SHIPPED | OUTPATIENT
Start: 2023-03-20

## 2023-03-20 NOTE — TELEPHONE ENCOUNTER
Please review. Protocol failed/No protocol      Requested Prescriptions   Pending Prescriptions Disp Refills    METOPROLOL SUCCINATE ER 50 MG Oral Tablet 24 Hr [Pharmacy Med Name: METOPROLOL ER SUCCINATE 50MG TABS] 135 tablet 1     Sig: TAKE 1 AND 1/2 TABLETS(75 MG) BY MOUTH EVERY EVENING       Hypertensive Medications Protocol Failed - 3/19/2023  3:54 AM        Failed - Last BP reading less than 140/90     BP Readings from Last 1 Encounters:  03/16/23 : 150/70              Failed - CMP or BMP in past 6 months     No results found for this or any previous visit (from the past 4392 hour(s)).             Passed - In person appointment in the past 12 or next 3 months     Recent Outpatient Visits              4 days ago Essential hypertension    [address], [address], [place], [address]    1 month ago Need for influenza vaccination    [facility]    Nurse Only    7 months ago Encounter for [employer] annual wellness exam    [address] [facility], [address] [place], [place]    Office Visit    8 months ago Cancer of sigmoid colon [facility])    [address] [facility], [address] [facility]    Office Visit    8 months ago Cancer of sigmoid colon [facility])    [facility] Hematology Oncology KEISHA Weston    Office Visit          Future Appointments         Provider Department Appt Notes    In 1 month Rickie Padilla DO [address] [facility], [facility] - In person appointment or virtual visit in the past 6 months     Recent Outpatient Visits              4 days ago Essential hypertension    [address], [address], [address] [facility], [address]    1 month ago Need for influenza vaccination    [address], [address], [address] [facility]. Only    7 months ago Encounter for [employer] exam    [address] [facility], [address] [place], [place]    Office Visit    8 months ago Cancer of sigmoid colon [facility])    [address] [facility] June Verma, APRN    Office Visit    8 months ago Cancer of sigmoid colon [facility])    [facility] Hematology Oncology Sarah Escobedo, APRN    Office Visit          Future Appointments         Provider Department Appt Notes    In 1 month Raphael Cedeño DO [address], [address], [address] or GFRAA > 50     GFR Evaluation  GFRAA: 72 , resulted on 8/1/2022               Recent Outpatient Visits              4 days ago Essential hypertension    [address], [address], [address] [place], [place]    Office Visit    1 month ago Need for influenza vaccination    [address] [facility]    Nurse Only    7 months ago Encounter for [employer] annual wellness exam    [address] [facility], [address] [place], [place]    Office Visit    8 months ago Cancer of sigmoid colon [facility])    [address], [address], [address] [facility], APRN    Office Visit    8 months ago Cancer of sigmoid colon [facility])    [facility] Hematology Oncology Sarah Escobedo, APRN    Office Visit            Future Appointments         Provider Department Appt Notes    In 1 month Raphael Cedeño, [address], [address], [facility]

## 2023-04-19 ENCOUNTER — OFFICE VISIT (OUTPATIENT)
Facility: CLINIC | Age: 68
End: 2023-04-19
Payer: MEDICARE

## 2023-04-19 VITALS
HEIGHT: 62 IN | BODY MASS INDEX: 29.63 KG/M2 | OXYGEN SATURATION: 97 % | HEART RATE: 61 BPM | DIASTOLIC BLOOD PRESSURE: 78 MMHG | WEIGHT: 161 LBS | SYSTOLIC BLOOD PRESSURE: 132 MMHG

## 2023-04-19 DIAGNOSIS — Z12.31 VISIT FOR SCREENING MAMMOGRAM: ICD-10-CM

## 2023-04-19 DIAGNOSIS — R05.1 ACUTE COUGH: ICD-10-CM

## 2023-04-19 DIAGNOSIS — I10 ESSENTIAL HYPERTENSION: Primary | ICD-10-CM

## 2023-04-19 PROCEDURE — 99213 OFFICE O/P EST LOW 20 MIN: CPT | Performed by: FAMILY MEDICINE

## 2023-04-19 RX ORDER — HYDROCHLOROTHIAZIDE 25 MG/1
25 TABLET ORAL DAILY
Qty: 90 TABLET | Refills: 2 | Status: SHIPPED | OUTPATIENT
Start: 2023-04-19

## 2023-04-19 RX ORDER — AMLODIPINE BESYLATE 10 MG/1
10 TABLET ORAL NIGHTLY
Qty: 90 TABLET | Refills: 2 | Status: SHIPPED | OUTPATIENT
Start: 2023-04-19

## 2023-05-12 ENCOUNTER — HOSPITAL ENCOUNTER (OUTPATIENT)
Dept: MAMMOGRAPHY | Age: 68
Discharge: HOME | End: 2023-05-12
Attending: FAMILY MEDICINE
Payer: MEDICARE

## 2023-05-12 DIAGNOSIS — Z12.31 VISIT FOR SCREENING MAMMOGRAM: ICD-10-CM

## 2023-05-12 PROCEDURE — 77063 BREAST TOMOSYNTHESIS BI: CPT | Performed by: FAMILY MEDICINE

## 2023-05-12 PROCEDURE — 77067 SCR MAMMO BI INCL CAD: CPT | Performed by: FAMILY MEDICINE

## 2023-05-19 ENCOUNTER — HOSPITAL ENCOUNTER (OUTPATIENT)
Dept: GENERAL RADIOLOGY | Facility: HOSPITAL | Age: 68
Discharge: HOME | End: 2023-05-19
Attending: UROLOGY
Payer: MEDICARE

## 2023-05-19 ENCOUNTER — HOSPITAL ENCOUNTER (OUTPATIENT)
Dept: CT IMAGING | Facility: HOSPITAL | Age: 68
Discharge: HOME | End: 2023-05-19
Attending: UROLOGY
Payer: MEDICARE

## 2023-05-19 DIAGNOSIS — C64.1 RENAL CELL CARCINOMA OF RIGHT KIDNEY (HCC): ICD-10-CM

## 2023-05-19 PROCEDURE — 71046 X-RAY EXAM CHEST 2 VIEWS: CPT | Performed by: UROLOGY

## 2023-05-26 ENCOUNTER — TELEPHONE (OUTPATIENT)
Dept: SURGERY | Facility: CLINIC | Age: 68
End: 2023-05-26

## 2023-05-26 NOTE — TELEPHONE ENCOUNTER
Patient requesting call back from nurse, requesting script for contrast, she can not take the dye. Patient is scheduled for CT on 6/8. Please call at [phone],[employer].

## 2023-05-30 NOTE — TELEPHONE ENCOUNTER
I s/w pt and told her that I will need to get a confirmation from [facility] to prescribe the Allergy prep of Benadryl and Prednisone and I will call her back with his response. Tasked to [facility], please advise. Pt's CT scan is schd for 6/8.

## 2023-06-06 RX ORDER — PREDNISONE 50 MG/1
TABLET ORAL
Qty: 3 TABLET | Refills: 0 | Status: SHIPPED | OUTPATIENT
Start: 2023-06-06

## 2023-06-07 NOTE — TELEPHONE ENCOUNTER
I called pt and LM on her identified VM that [facility] sent the scripts for the Benadryl and Prednisone that she needs to take in prep for her CT scan. She should call if she has any questions.

## 2023-06-08 ENCOUNTER — HOSPITAL ENCOUNTER (OUTPATIENT)
Dept: CT IMAGING | Facility: HOSPITAL | Age: 68
Discharge: HOME | End: 2023-06-08
Attending: UROLOGY
Payer: MEDICARE

## 2023-06-08 LAB
CREAT BLD-MCNC: 1 MG/DL
GFR SERPLBLD BASED ON 1.73 SQ M-ARVRAT: 62 ML/MIN/1.73M2 (ref 60–?)

## 2023-06-08 PROCEDURE — 74178 CT ABD&PLV WO CNTR FLWD CNTR: CPT | Performed by: UROLOGY

## 2023-06-08 PROCEDURE — 82565 ASSAY OF CREATININE: CPT

## 2023-06-12 NOTE — PROCEDURES
Cerumen Impaction    Reason(s) for procedure:  Bilateral cerumen impaction    Ear Exam:  Canal:  Right EAC with cerumen impaction and Left EAD with cerumen impaction     Tympanic Membrane:  Right TM not visualized  and Left TM not visualized    Visualized by: Otoscopy     Impacted Cerumen:  Bilaterally    Method of Removal:  Curette and irrigation     Cerumen impaction was completely removed.      Katie Roe DO  7/26/2022  5:05 PM Azithromycin Counseling:  I discussed with the patient the risks of azithromycin including but not limited to GI upset, allergic reaction, drug rash, diarrhea, and yeast infections.

## 2023-06-29 ENCOUNTER — TELEPHONE (OUTPATIENT)
Facility: CLINIC | Age: 68
End: 2023-06-29

## 2023-07-24 RX ORDER — METOPROLOL SUCCINATE 50 MG/1
75 TABLET, EXTENDED RELEASE ORAL EVERY EVENING
Qty: 135 TABLET | Refills: 0 | Status: SHIPPED | OUTPATIENT
Start: 2023-07-24

## 2023-07-24 NOTE — TELEPHONE ENCOUNTER
Patient cancelled follow up in June, is scheduled in September. Okay to send medication refill? Medication pended 90 for days.

## 2023-07-28 RX ORDER — METOPROLOL SUCCINATE 50 MG/1
75 TABLET, EXTENDED RELEASE ORAL EVERY EVENING
Qty: 135 TABLET | Refills: 0 | OUTPATIENT
Start: 2023-07-28

## 2023-08-06 RX ORDER — PRAVASTATIN SODIUM 40 MG
40 TABLET ORAL NIGHTLY
Qty: 90 TABLET | Refills: 1 | Status: SHIPPED | OUTPATIENT
Start: 2023-08-06

## 2023-08-06 NOTE — TELEPHONE ENCOUNTER
Please review. Protocol Failed or has No Protocol.     Requested Prescriptions   Pending Prescriptions Disp Refills    PRAVASTATIN 40 MG Oral Tab [Pharmacy Med Name: PRAVASTATIN 40MG TABLETS] 90 tablet 1     Sig: TAKE 1 TABLET(40 MG) BY MOUTH EVERY NIGHT       Cholesterol Medication Protocol Failed - 8/5/2023  3:54 AM        Failed - ALT in past 12 months        Failed - LDL in past 12 months        Failed - Last ALT < 80     Lab Results   Component Value Date    ALT 18 08/01/2022             Failed - Last LDL < 130     Lab Results   Component Value Date    LDL 78 08/01/2022             Passed - In person appointment or virtual visit in the past 12 mos or appointment in next 3 mos     Recent Outpatient Visits              3 months ago Essential hypertension    [address], [address], [place], [address]    4 months ago Essential hypertension    [address], [address], [place], [address]    6 months ago Need for influenza vaccination    [address], [address], [place]    Nurse Only    1 year ago Encounter for [employer] annual wellness exam    [address] [facility] [address], [place], [place]    Office Visit    1 year ago Cancer of sigmoid colon [facility])    [address], [address], Antoinette Daily Done, APRN    Office Visit          Future Appointments         Provider Department Appt Notes    In 1 month La[place], [address], [address], [employer]                     Future Appointments         Provider Department Appt Notes    In 1 month La[place] Dodge, [address], [address], [address] Visits              3 months ago Essential hypertension    [address], [address], [place], [address]    4 months ago Essential hypertension    [facility], [address], [address] [place], [place]    Office Visit    6 months ago Need for influenza vaccination    [address], [facility]    Nurse Only    1 year ago Encounter for [employer] annual wellness exam    [address], [address] [place], [place]    Office Visit    1 year ago Cancer of sigmoid colon [facility])    [address], [address] [facility]    Office Visit

## 2023-09-20 ENCOUNTER — OFFICE VISIT (OUTPATIENT)
Facility: CLINIC | Age: 68
End: 2023-09-20
Payer: MEDICARE

## 2023-09-20 ENCOUNTER — LAB ENCOUNTER (OUTPATIENT)
Dept: LAB | Facility: REFERENCE LAB | Age: 68
End: 2023-09-20
Attending: FAMILY MEDICINE
Payer: MEDICARE

## 2023-09-20 VITALS
DIASTOLIC BLOOD PRESSURE: 84 MMHG | HEART RATE: 55 BPM | SYSTOLIC BLOOD PRESSURE: 128 MMHG | BODY MASS INDEX: 31.1 KG/M2 | WEIGHT: 169 LBS | HEIGHT: 62 IN | OXYGEN SATURATION: 98 %

## 2023-09-20 DIAGNOSIS — Z78.0 POSTMENOPAUSAL: ICD-10-CM

## 2023-09-20 DIAGNOSIS — Z85.038 PERSONAL HISTORY OF COLON CANCER: ICD-10-CM

## 2023-09-20 DIAGNOSIS — Z00.00 ENCOUNTER FOR MEDICARE ANNUAL WELLNESS EXAM: Primary | ICD-10-CM

## 2023-09-20 DIAGNOSIS — I10 ESSENTIAL HYPERTENSION: ICD-10-CM

## 2023-09-20 DIAGNOSIS — D12.5 ADENOMATOUS POLYP OF SIGMOID COLON: ICD-10-CM

## 2023-09-20 DIAGNOSIS — E78.5 HYPERLIPIDEMIA, UNSPECIFIED HYPERLIPIDEMIA TYPE: ICD-10-CM

## 2023-09-20 DIAGNOSIS — Z00.00 ENCOUNTER FOR MEDICARE ANNUAL WELLNESS EXAM: ICD-10-CM

## 2023-09-20 DIAGNOSIS — R73.03 PREDIABETES: ICD-10-CM

## 2023-09-20 DIAGNOSIS — Z85.528 HISTORY OF RENAL CELL CARCINOMA: ICD-10-CM

## 2023-09-20 LAB
ALBUMIN SERPL-MCNC: 3.9 G/DL (ref 3.4–5)
ALBUMIN/GLOB SERPL: 1 {RATIO} (ref 1–2)
ALP LIVER SERPL-CCNC: 61 U/L
ALT SERPL-CCNC: 25 U/L
ANION GAP SERPL CALC-SCNC: 6 MMOL/L (ref 0–18)
AST SERPL-CCNC: 15 U/L (ref 15–37)
BASOPHILS # BLD AUTO: 0.02 X10(3) UL (ref 0–0.2)
BASOPHILS NFR BLD AUTO: 0.3 %
BILIRUB SERPL-MCNC: 0.5 MG/DL (ref 0.1–2)
BUN BLD-MCNC: 13 MG/DL (ref 7–18)
BUN/CREAT SERPL: 12.9 (ref 10–20)
CALCIUM BLD-MCNC: 9.2 MG/DL (ref 8.5–10.1)
CHLORIDE SERPL-SCNC: 107 MMOL/L (ref 98–112)
CHOLEST SERPL-MCNC: 140 MG/DL (ref ?–200)
CO2 SERPL-SCNC: 29 MMOL/L (ref 21–32)
CREAT BLD-MCNC: 1.01 MG/DL
CREAT UR-SCNC: 180 MG/DL
DEPRECATED RDW RBC AUTO: 39 FL (ref 35.1–46.3)
EGFRCR SERPLBLD CKD-EPI 2021: 61 ML/MIN/1.73M2 (ref 60–?)
EOSINOPHIL # BLD AUTO: 0.06 X10(3) UL (ref 0–0.7)
EOSINOPHIL NFR BLD AUTO: 1 %
ERYTHROCYTE [DISTWIDTH] IN BLOOD BY AUTOMATED COUNT: 11.9 % (ref 11–15)
EST. AVERAGE GLUCOSE BLD GHB EST-MCNC: 120 MG/DL (ref 68–126)
FASTING PATIENT LIPID ANSWER: YES
FASTING STATUS PATIENT QL REPORTED: YES
GLOBULIN PLAS-MCNC: 3.9 G/DL (ref 2.8–4.4)
GLUCOSE BLD-MCNC: 127 MG/DL (ref 70–99)
HBA1C MFR BLD: 5.8 % (ref ?–5.7)
HCT VFR BLD AUTO: 38.3 %
HDLC SERPL-MCNC: 53 MG/DL (ref 40–59)
HGB BLD-MCNC: 12.8 G/DL
IMM GRANULOCYTES # BLD AUTO: 0.02 X10(3) UL (ref 0–1)
IMM GRANULOCYTES NFR BLD: 0.3 %
LDLC SERPL CALC-MCNC: 75 MG/DL (ref ?–100)
LYMPHOCYTES # BLD AUTO: 1.84 X10(3) UL (ref 1–4)
LYMPHOCYTES NFR BLD AUTO: 30.9 %
MCH RBC QN AUTO: 30.1 PG (ref 26–34)
MCHC RBC AUTO-ENTMCNC: 33.4 G/DL (ref 31–37)
MCV RBC AUTO: 90.1 FL
MICROALBUMIN UR-MCNC: 1.07 MG/DL
MICROALBUMIN/CREAT 24H UR-RTO: 5.9 UG/MG (ref ?–30)
MONOCYTES # BLD AUTO: 0.52 X10(3) UL (ref 0.1–1)
MONOCYTES NFR BLD AUTO: 8.7 %
NEUTROPHILS # BLD AUTO: 3.5 X10 (3) UL (ref 1.5–7.7)
NEUTROPHILS # BLD AUTO: 3.5 X10(3) UL (ref 1.5–7.7)
NEUTROPHILS NFR BLD AUTO: 58.8 %
NONHDLC SERPL-MCNC: 87 MG/DL (ref ?–130)
OSMOLALITY SERPL CALC.SUM OF ELEC: 296 MOSM/KG (ref 275–295)
PLATELET # BLD AUTO: 308 10(3)UL (ref 150–450)
POTASSIUM SERPL-SCNC: 3.8 MMOL/L (ref 3.5–5.1)
PROT SERPL-MCNC: 7.8 G/DL (ref 6.4–8.2)
RBC # BLD AUTO: 4.25 X10(6)UL
SODIUM SERPL-SCNC: 142 MMOL/L (ref 136–145)
TRIGL SERPL-MCNC: 56 MG/DL (ref 30–149)
VLDLC SERPL CALC-MCNC: 9 MG/DL (ref 0–30)
WBC # BLD AUTO: 6 X10(3) UL (ref 4–11)

## 2023-09-20 PROCEDURE — 36415 COLL VENOUS BLD VENIPUNCTURE: CPT

## 2023-09-20 PROCEDURE — 80061 LIPID PANEL: CPT

## 2023-09-20 PROCEDURE — 80053 COMPREHEN METABOLIC PANEL: CPT

## 2023-09-20 PROCEDURE — 99214 OFFICE O/P EST MOD 30 MIN: CPT | Performed by: FAMILY MEDICINE

## 2023-09-20 PROCEDURE — 82570 ASSAY OF URINE CREATININE: CPT

## 2023-09-20 PROCEDURE — 85025 COMPLETE CBC W/AUTO DIFF WBC: CPT

## 2023-09-20 PROCEDURE — 82043 UR ALBUMIN QUANTITATIVE: CPT

## 2023-09-20 PROCEDURE — 90662 IIV NO PRSV INCREASED AG IM: CPT | Performed by: FAMILY MEDICINE

## 2023-09-20 PROCEDURE — G0438 PPPS, INITIAL VISIT: HCPCS | Performed by: FAMILY MEDICINE

## 2023-09-20 PROCEDURE — 83036 HEMOGLOBIN GLYCOSYLATED A1C: CPT

## 2023-09-20 PROCEDURE — G0008 ADMIN INFLUENZA VIRUS VAC: HCPCS | Performed by: FAMILY MEDICINE

## 2023-10-25 RX ORDER — AMLODIPINE BESYLATE 10 MG/1
10 TABLET ORAL NIGHTLY
Qty: 90 TABLET | Refills: 3 | Status: SHIPPED | OUTPATIENT
Start: 2023-10-25

## 2023-10-25 RX ORDER — HYDROCHLOROTHIAZIDE 25 MG/1
25 TABLET ORAL DAILY
Qty: 90 TABLET | Refills: 3 | Status: SHIPPED | OUTPATIENT
Start: 2023-10-25

## 2023-10-25 NOTE — TELEPHONE ENCOUNTER
Refill passed per [facility] protocol.   Requested Prescriptions   Pending Prescriptions Disp Refills    HYDROCHLOROTHIAZIDE 25 MG Oral Tab [Pharmacy Med Name: HYDROCHLOROTHIAZIDE 25MG TABLETS] 90 tablet 2     Sig: TAKE 1 TABLET(25 MG) BY MOUTH DAILY       Hypertensive Medications Protocol Passed - 10/25/2023  8:03 AM        Passed - In person appointment in the past 12 or next 3 months     Recent Outpatient Visits              1 month ago Encounter for Medicare annual wellness exam    [facility] Rachel Mckinley,     Office Visit    6 months ago Essential hypertension    [facility] Rachel Mckinley, DO    Office Visit    7 months ago Essential hypertension    [facility] Rachel Mckinley,     Office Visit    8 months ago Need for influenza vaccination    [facility] [employer]    Nurse Only    1 year ago Encounter for Medicare annual wellness exam    [facility] Rachel Mckinley, DO    Office Visit                      Passed - Last BP reading less than 140/90     BP Readings from Last 1 Encounters:  09/20/23 : 128/84              Passed - CMP or BMP in past 6 months     Recent Results (from the past 4392 hour(s))   CMP    Collection Time: 09/20/23 10:10 AM   Result Value Ref Range    Glucose 127 (H) 70 - 99 mg/dL    Sodium 142 136 - 145 mmol/L    Potassium 3.8 3.5 - 5.1 mmol/L    Chloride 107 98 - 112 mmol/L    CO2 29.0 21.0 - 32.0 mmol/L    Anion Gap 6 0 - 18 mmol/L    BUN 13 7 - 18 mg/dL    Creatinine 1.01 0.55 - 1.02 mg/dL    BUN/CREA Ratio 12.9 10.0 - 20.0    Calcium, Total 9.2 8.5 - 10.1 mg/dL    Calculated Osmolality 296 (H) 275 - 295 mOsm/kg    eGFR-Cr 61 >=60 mL/min/1.73m2    ALT 25 13 - 56 U/L    AST 15 15 - 37 U/L    Alkaline Phosphatase 61 55 - 142 U/L    Bilirubin, Total 0.5 0.1 - 2.0 mg/dL    Total Protein 7.8 6.4 -  8.2 g/dL    Albumin 3.9 3.4 - 5.0 g/dL    Globulin  3.9 2.8 - 4.4 g/dL    A/G Ratio 1.0 1.0 - 2.0    Patient Fasting for CMP? Yes      *Note: Due to a large number of results and/or encounters for the requested time period, some results have not been displayed. A complete set of results can be found in Results Review.               Passed - In person appointment or virtual visit in the past 6 months     Recent Outpatient Visits              1 month ago Encounter for Medicare annual wellness exam    [facility] [place] Rachel Mckinley, DO    Office Visit    6 months ago Essential hypertension    [facility] [place] Rachel Mckinley, DO    Office Visit    7 months ago Essential hypertension    [facility] [place] Rachel Mckinley, DO    Office Visit    8 months ago Need for influenza vaccination    [facility] [place]    Nurse Only    1 year ago Encounter for Medicare annual wellness exam    [facility] Rachel Mckinley, DO    Office Visit                      Passed - EGFRCR or GFRAA > 50     GFR Evaluation  EGFRCR: 61 , resulted on 9/20/2023            AMLODIPINE 10 MG Oral Tab [Pharmacy Med Name: AMLODIPINE BESYLATE 10MG TABLETS] 90 tablet 2     Sig: TAKE 1 TABLET(10 MG) BY MOUTH AT BEDTIME       Hypertensive Medications Protocol Passed - 10/25/2023  8:03 AM        Passed - In person appointment in the past 12 or next 3 months     Recent Outpatient Visits              1 month ago Encounter for Medicare annual wellness exam    [facility] Rachel Mckinley, DO    Office Visit    6 months ago Essential hypertension    [facility] [place] Rachel Mckinley, DO    Office Visit    7 months ago Essential hypertension    [facility] [place],     Office  Visit    8 months ago Need for influenza vaccination    [facility] [place]    Nurse Only    1 year ago Encounter for Medicare annual wellness exam    [facility],     Office Visit                      Passed - Last BP reading less than 140/90     BP Readings from Last 1 Encounters:  09/20/23 : 128/84              Passed - CMP or BMP in past 6 months     Recent Results (from the past 4392 hour(s))   CMP    Collection Time: 09/20/23 10:10 AM   Result Value Ref Range    Glucose 127 (H) 70 - 99 mg/dL    Sodium 142 136 - 145 mmol/L    Potassium 3.8 3.5 - 5.1 mmol/L    Chloride 107 98 - 112 mmol/L    CO2 29.0 21.0 - 32.0 mmol/L    Anion Gap 6 0 - 18 mmol/L    BUN 13 7 - 18 mg/dL    Creatinine 1.01 0.55 - 1.02 mg/dL    BUN/CREA Ratio 12.9 10.0 - 20.0    Calcium, Total 9.2 8.5 - 10.1 mg/dL    Calculated Osmolality 296 (H) 275 - 295 mOsm/kg    eGFR-Cr 61 >=60 mL/min/1.73m2    ALT 25 13 - 56 U/L    AST 15 15 - 37 U/L    Alkaline Phosphatase 61 55 - 142 U/L    Bilirubin, Total 0.5 0.1 - 2.0 mg/dL    Total Protein 7.8 6.4 - 8.2 g/dL    Albumin 3.9 3.4 - 5.0 g/dL    Globulin  3.9 2.8 - 4.4 g/dL    A/G Ratio 1.0 1.0 - 2.0    Patient Fasting for CMP? Yes      *Note: Due to a large number of results and/or encounters for the requested time period, some results have not been displayed. A complete set of results can be found in Results Review.               Passed - In person appointment or virtual visit in the past 6 months     Recent Outpatient Visits              1 month ago En[facility] for Medicare annual wellness exam    [facility]     [facility]    6 months ago Essential hypertension    [facility] [place],     Office Visit    7 months ago Essential hypertension    [facility]     Office Visit    8 months ago [facility] influenza [facility]    Nurse Only    1 year ago Encounter for Medicare annual wellness exam    [facility] [place],     Office Visit                      Passed - EGFRCR or GFRAA > 50     GFR Evaluation  EGFRCR: 61 , resulted on 9/20/2023              Recent Outpatient Visits              1 month ago Encounter for Medicare annual wellness exam    [facility] [place],     Office Visit    6 months ago Essential hypertension    [facility] [place],     Office Visit    7 months ago Essential hypertension    [facility] [place],     Office Visit    8 months ago [facility] for influenza vaccination    [facility] [place]    Nurse Only    1 year ago Encounter for Medicare annual wellness exam    [facility] [place],     Office Visit

## 2023-10-28 RX ORDER — METOPROLOL SUCCINATE 50 MG/1
75 TABLET, EXTENDED RELEASE ORAL EVERY EVENING
Qty: 135 TABLET | Refills: 3 | Status: SHIPPED | OUTPATIENT
Start: 2023-10-28

## 2023-10-28 NOTE — TELEPHONE ENCOUNTER
Refill passed per [place] [employer] protocol.     Requested Prescriptions   Pending Prescriptions Disp Refills    METOPROLOL SUCCINATE ER 50 MG Oral Tablet 24 Hr [Pharmacy Med Name: METOPROLOL ER SUCCINATE 50MG TABS] 135 tablet 0     Sig: TAKE 1 AND 1/2 TABLETS(75 MG) BY MOUTH EVERY EVENING       Hypertensive Medications Protocol Passed - 10/27/2023  1:17 PM        Passed - In person appointment in the past 12 or next 3 months     Recent Outpatient Visits              1 month ago Encounter for [employer] annual wellness exam    Stephen Lemus, [address] [place], [place]    Office Visit    6 months ago Essential hypertension    [address], [address], Alexis Julian [facility], [address] [place] [address] Visit    7 months ago Essential hypertension    [address], [address], [place], [place]    Office Visit    9 months ago Need for influenza vaccination    [address], [address], [facility]    Nurse Only    1 year ago Encounter for [employer] annual wellness exam    Stephen Lemus, [address] [place], [place]    Office Visit                      Passed - Last BP reading less than 140/90     BP Readings from Last 1 Encounters:  09/20/23 : 128/84              Passed - CMP or BMP in past 6 months     Recent Results (from the past 4392 hour(s))   CMP    Collection Time: 09/20/23 10:10 AM   Result Value Ref Range    Glucose 127 (H) 70 - 99 mg/dL    Sodium 142 136 - 145 mmol/L    Potassium 3.8 3.5 - 5.1 mmol/L    Chloride 107 98 - 112 mmol/L    CO2 29.0 21.0 - 32.0 mmol/L    Anion Gap 6 0 - 18 mmol/L    BUN 13 7 - 18 mg/dL    Creatinine 1.01 0.55 - 1.02 mg/dL    BUN/CREA Ratio 12.9 10.0 - 20.0    Calcium, Total 9.2 8.5 - 10.1 mg/dL    Calculated Osmolality 296 (H) 275 - 295 mOsm/kg    eGFR-Cr 61 >=60 mL/min/1.73m2    ALT 25 13 - 56 U/L    AST 15 15 - 37 U/L    Alkaline Phosphatase 61 55 - 142 U/L    Bilirubin, Total 0.5 0.1 - 2.0 mg/dL    Total Protein 7.8 6.4 - 8.2 g/dL    Albumin 3.9 3.4 - 5.0 g/dL    Globulin  3.9 2.8 - 4.4 g/dL    A/G Ratio 1.0 1.0 - 2.0    Patient Fasting for CMP? Yes      *Note: Due to a large number of results and/or encounters for the requested time period, some results have not been displayed. A complete set of results can be found in Results Review.                Passed - In person appointment or virtual visit in the past 6 months     Recent Outpatient Visits              1 month ago Encounter for [employer] annual wellness exam    Javier Narvaez, [address] [place], [place]    Office Visit    6 months ago Essential hypertension    Milton Boas, [address], [address], [address]    7 months ago Essential hypertension    Milton Boas, [address], [place], [address]    9 months ago Need for influenza vaccination    Milton Boas, [address], [facility]    Nurse Only    1 year ago Encounter for [employer] annual wellness exam    Javier Narvaez, [address] [facility], [address] or GFRAA > 50     GFR Evaluation  EGFRCR: 61 , resulted on 9/20/2023               Recent Outpatient Visits              1 month ago Encounter for [employer] annual wellness exam    Javier Narvaez, [address] [place], [place]    Office Visit    6 months ago Essential hypertension    Milton Boas, [address], [address] [facility], [facility]    7 months ago Essential hypertension    Milton Boas, [address], [place], [address]    9 months ago Need for influenza vaccination    Milton Boas, [address], [facility]    Nurse Only    1 year ago Encounter for [employer] annual wellness exam    Eliel Heredia [place], [place] Office Visit

## 2024-02-06 RX ORDER — PRAVASTATIN SODIUM 40 MG
40 TABLET ORAL NIGHTLY
Qty: 90 TABLET | Refills: 2 | Status: SHIPPED | OUTPATIENT
Start: 2024-02-06

## 2024-02-06 NOTE — TELEPHONE ENCOUNTER
Refill passed per [facility] protocol.    Requested Prescriptions   Pending Prescriptions Disp Refills    pravastatin 40 MG Oral Tab 90 tablet 1     Sig: Take 1 tablet (40 mg total) by mouth nightly.       Cholesterol Medication Protocol Passed - 2/6/2024 11:14 AM        Passed - ALT < 80     Lab Results   Component Value Date    ALT 25 09/20/2023             Passed - ALT resulted within past year        Passed - Lipid panel within past 12 months     Lab Results   Component Value Date    CHOLEST 140 09/20/2023    TRIG 56 09/20/2023    HDL 53 09/20/2023    LDL 75 09/20/2023    VLDL 9 09/20/2023    NONHDLC 87 09/20/2023             Passed - In person appointment or virtual visit in the past 12 mos or appointment in next 3 mos     Recent Outpatient Visits              4 months ago Encounter for Medicare annual wellness exam    [facility] [place] Rachel Mckinley,     Office Visit    9 months ago Essential hypertension    [facility] [place] Rachel Mckinley, DO    Office Visit    10 months ago Essential hypertension    [facility] [place] Rachel Mckinley, DO    Office Visit    1 year ago Need for influenza vaccination    [facility] [place]    Nurse Only    1 year ago Encounter for Medicare annual wellness exam    [facility] [place] Rachel Mckinley, DO    Office Visit                           Recent Outpatient Visits              4 months ago Encounter for Medicare annual wellness exam    [facility] [place], [place]    Office Visit    9 months ago Essential hypertension    [facility] [place] Rachel Mckinley, DO    Office Visit    10 months ago Essential hypertension    [facility] [place], DO    Office Visit    1 year ago Need for influenza  vaccination    [facility]    Nurse Only    1 year ago Encounter for Medicare annual wellness exam    [facility] Rachel Mckinley,     Office Visit

## 2024-05-07 ENCOUNTER — HOSPITAL ENCOUNTER (OUTPATIENT)
Age: 69
Discharge: HOME | End: 2024-05-07
Payer: MEDICARE

## 2024-05-07 VITALS
RESPIRATION RATE: 20 BRPM | OXYGEN SATURATION: 98 % | HEART RATE: 66 BPM | DIASTOLIC BLOOD PRESSURE: 62 MMHG | TEMPERATURE: 97 F | SYSTOLIC BLOOD PRESSURE: 153 MMHG

## 2024-05-07 DIAGNOSIS — R05.1 ACUTE COUGH: Primary | ICD-10-CM

## 2024-05-07 LAB — SARS-COV-2 RNA RESP QL NAA+PROBE: NOT DETECTED

## 2024-05-07 PROCEDURE — U0002 COVID-19 LAB TEST NON-CDC: HCPCS | Performed by: NURSE PRACTITIONER

## 2024-05-07 PROCEDURE — 99213 OFFICE O/P EST LOW 20 MIN: CPT | Performed by: NURSE PRACTITIONER

## 2024-05-07 RX ORDER — BENZONATATE 100 MG/1
100 CAPSULE ORAL 2 TIMES DAILY PRN
Qty: 24 CAPSULE | Refills: 0 | Status: SHIPPED | OUTPATIENT
Start: 2024-05-07 | End: 2024-05-07

## 2024-05-07 RX ORDER — BENZONATATE 100 MG/1
100 CAPSULE ORAL 3 TIMES DAILY PRN
Qty: 30 CAPSULE | Refills: 0 | Status: SHIPPED | OUTPATIENT
Start: 2024-05-07 | End: 2024-06-06

## 2024-05-07 NOTE — ED PROVIDER NOTES
Patient Seen in: [facility]      History     Chief Complaint   Patient presents with    Cough     Stated Complaint: cough    Subjective:   Well-appearing 69-year-old female with essential hypertension, hyperlipidemia and a history of sigmoid colon and renal CA presents with complaints of a nonproductive cough for the past 3 days.  Patient communicates that with onset of cough she also had a sore throat which has now resolved.  Patient communicates that she is taking Mucinex DM for cough with little relief.  Patient denies chest pain or shortness of breath.  Patient denies fever or chills.  Patient communicates normal appetite/p.o. intake.        Objective:   Past Medical History:    Anesthesia complication    Cancer of sigmoid colon (HCC)    High blood pressure    High cholesterol    PONV (postoperative nausea and vomiting)    Renal cell carcinoma (HCC)    Visual impairment    glasses              Past Surgical History:   Procedure Laterality Date    Breast surgery  1998    Benign breast cyst    Colonoscopy N/A 8/10/2021    Procedure: COLONOSCOPY;  Surgeon: ROSALINA Mccray MD;  Location: [facility] ENDOSCOPY    Colonoscopy N/A 11/15/2021    Procedure: COLONOSCOPY;  Surgeon: Juan Mahan MD;  Location: [facility] ENDOSCOPY    Colonoscopy N/A 10/20/2022    Procedure: COLONOSCOPY;  Surgeon: Orlando Dumas MD;  Location: [facility] ENDO    Kidney surgery  11/23/2021    Tumor removed (Right side)    Laparoscopy, surgical; partial nephrectomy Right 11/23/2021    right robotic partial nephrectomy with intraoperative ultrasound, Dr. Villegas @ [facility] localization wire 1 site left (cpt=19281)      1998    Tubal ligation  1990                Social History     Socioeconomic History    Marital status:    Tobacco Use    Smoking status: Never    Smokeless tobacco: Never   Vaping Use    Vaping status: Never Used   Substance and Sexual Activity    Alcohol use: Never    Drug use: Never    Sexual activity: Yes      Partners: Male   Social History Narrative    Emily is  to Yesy x 46 yrs. Mother of 3 adult children. She works part time in home health care. Patient lives with  and two of her daughters in [place], [place].     Social Determinants of Health      Received [facility], [facility]    Social Connections    Received from [facility], [facility]    Housing Stability              Review of Systems    Positive for stated complaint: cough  Other systems are as noted in HPI.  Constitutional and vital signs reviewed.      All other systems reviewed and negative except as noted above.    Physical Exam     ED Triage Vitals [05/07/24 1335]   /62   Pulse 66   Resp 20   Temp 97.2 °F (36.2 °C)   Temp src Temporal   SpO2 98 %   O2 Device None (Room air)       Current Vitals:   Vital Signs  BP: 153/62  Pulse: 66  Resp: 20  Temp: 97.2 °F (36.2 °C)  Temp src: Temporal    Oxygen Therapy  SpO2: 98 %  O2 Device: None (Room air)      Physical Exam  VS: Vital signs reviewed. 02 saturation within normal limits for this patient.    General: Patient is awake and alert, oriented to person, place and time. Pt appears non-toxic.     HEENT: Head is normocephalic, atraumatic. Nonicteric sclera, no conjunctival injection. No facial droop or slurred speech. No oral lesions or pallor. Mucous membranes moist.      Right Ear: Tympanic membrane, ear canal and external ear normal.      Left Ear: Tympanic membrane, ear canal and external ear normal.      Nose: No congestion or rhinorrhea.      Mouth/Throat:      Lips: Pink.      Mouth: Mucous membranes are moist.      Pharynx: Oropharynx is clear.     Neck: No cervical lymphadenopathy. Supple. Normal ROM.    Heart: Regular rate and rhythm, normal S1, normal S2.    Lungs: Clear to auscultation. Good inspiratory effort. + Airway entry bilaterally without wheezes, rhonchi or crackles. No accessory muscle use or  tachypnea.    Extremities: No focal swelling or tenderness. Capillary refill noted.     Skin: Warm, dry and normal in color.     Psychiatric: Normal affect, judgement normal, insight normal.     CNS: Moves all 4 extremities. Interacts appropriately. No gait abnormality. Memory normal.        ED Course     Labs Reviewed   RAPID SARS-COV-2 BY PCR - Normal       MDM   Medical Decision Making  Well-appearing.  Lungs clear to auscultation bilaterally.  Rapid COVID-19 PCR not detected.  Vitals within normal limits.  Chest x-ray deferred.  Prescription for benzonatate was sent to pharmacy on file for cough.  I discussed using Mucinex/guaifenesin in addition to benzonatate.  I discussed with patient that she should not take Mucinex DM if she will be taking benzonatate.  Patient verbalized understanding.  Close PMD follow-up as well as return precautions discussed.    Problems Addressed:  Acute cough: acute illness or injury    Amount and/or Complexity of Data Reviewed  Labs: ordered. Decision-making details documented in ED Course.    Risk  OTC drugs.  Prescription drug management.        Disposition and Plan     Clinical Impression:  1. Acute cough         Disposition:  Discharge  5/7/2024  1:56 pm    Follow-up:  Rachel Mckinley DO  [address] [address] [phone]    In 1 week  As needed          Medications Prescribed:  Current Discharge Medication List        START taking these medications    Details   benzonatate 100 MG Oral Cap Take 1 capsule (100 mg total) by mouth 3 (three) times daily as needed for cough.  Qty: 30 capsule, Refills: 0

## 2024-07-16 ENCOUNTER — TELEPHONE (OUTPATIENT)
Facility: CLINIC | Age: 69
End: 2024-07-16

## 2024-07-16 NOTE — TELEPHONE ENCOUNTER
Outgoing call to patient but no answer . A detail message was left to call back to assist scheduling medicare physical .    Pt is due 9/20/24    1 Attempt

## 2024-11-01 RX ORDER — METOPROLOL SUCCINATE 50 MG/1
75 TABLET, EXTENDED RELEASE ORAL EVERY EVENING
Qty: 135 TABLET | Refills: 0 | Status: SHIPPED | OUTPATIENT
Start: 2024-11-01

## 2024-11-01 RX ORDER — PRAVASTATIN SODIUM 40 MG
40 TABLET ORAL NIGHTLY
Qty: 90 TABLET | Refills: 0 | Status: SHIPPED | OUTPATIENT
Start: 2024-11-01

## 2024-11-01 NOTE — TELEPHONE ENCOUNTER
Please review.  Protocol failed / Has no protocol.    Future Appointments   Date Time Provider Department Center   11/13/2024  9:30 AM Rachel Mckinley DO [facility]      Labs not yet pended.    Requested Prescriptions   Pending Prescriptions Disp Refills    PRAVASTATIN 40 MG Oral Tab [Pharmacy Med Name: PRAVASTATIN 40MG TABLETS] 90 tablet 3     Sig: TAKE 1 TABLET(40 MG) BY MOUTH EVERY NIGHT       Cholesterol Medication Protocol Failed - 11/1/2024  4:01 PM        Failed - ALT < 80     Lab Results   Component Value Date    ALT 25 09/20/2023             Failed - ALT resulted within past year        Failed - Lipid panel within past 12 months     Lab Results   Component Value Date    CHOLEST 140 09/20/2023    TRIG 56 09/20/2023    HDL 53 09/20/2023    LDL 75 09/20/2023    VLDL 9 09/20/2023    NONHDLC 87 09/20/2023             Passed - In person appointment or virtual visit in the past 12 mos or appointment in next 3 mos     Recent Outpatient Visits              1 year ago Encounter for Medicare annual wellness exam    [facility], [facility] Rachel Mckinley DO    Office Visit    1 year ago Essential hypertension    [facility] Rachel Mckinley DO    Office Visit    1 year ago Essential hypertension    [facility] Rachel Mckinley DO    Office Visit    1 year ago Need for influenza vaccination    [facility]    Nurse Only    2 years ago Encounter for Medicare annual wellness exam    [facility] Rachel Mckinley DO    Office Visit          Future Appointments         Provider Department Appt Notes    In 1 week Rachel Mckinley DO [facility], [facility] Annual                      METOPROLOL SUCCINATE ER 50 MG Oral Tablet 24 Hr [Pharmacy Med Name: METOPROLOL ER SUCCINATE 50MG TABS] 135 tablet 3     Sig: TAKE 1  AND 1/2 TABLETS(75 MG) BY MOUTH EVERY EVENING       Hypertension Medications Protocol Failed - 11/1/2024  4:01 PM        Failed - CMP or BMP in past 12 months        Failed - Last BP reading less than 140/90     BP Readings from Last 1 Encounters:   05/07/24 153/62               Failed - EGFRCR or GFRAA > 50     GFR Evaluation            Passed - In person appointment or virtual visit in the past 12 mos or appointment in next 3 mos     Recent Outpatient Visits              1 year ago Encounter for Medicare annual wellness exam    [facility] Rachel Mckinley,     Office Visit    1 year ago Essential hypertension    [facility] Rachel Mckinley,     Office Visit    1 year ago Essential hypertension    [facility] Rachel Mckinley,     Office Visit    1 year ago Need for influenza vaccination    [facility]    Nurse Only    2 years ago Encounter for Medicare annual wellness exam    [facility] Rachel Mckinley,     Office Visit          Future Appointments         Provider Department Appt Notes    In 1 week Rachel Mckinley DO [facility], [facility] Annual                       Future Appointments         Provider Department Appt Notes    In 1 week Rachel Mckinley DO [facility], [facility] Annual          Recent Outpatient Visits              1 year ago Encounter for Medicare annual wellness exam    [facility] Rachel Mckinley,     Office Visit    1 year ago Essential hypertension    [facility] Rachel Mckinley,     Office Visit    1 year ago Essential hypertension    [facility] Rachel Mckinley,     Office Visit    1 year ago Need for influenza  vaccination    [facility], [facility]    Nurse Only    2 years ago Encounter for Medicare annual wellness exam    [facility], [facility] Rachel Mckinley,     Office Visit

## 2024-11-13 ENCOUNTER — OFFICE VISIT (OUTPATIENT)
Facility: CLINIC | Age: 69
End: 2024-11-13
Payer: MEDICARE

## 2024-11-13 ENCOUNTER — LAB ENCOUNTER (OUTPATIENT)
Dept: LAB | Facility: REFERENCE LAB | Age: 69
End: 2024-11-13
Attending: FAMILY MEDICINE
Payer: MEDICARE

## 2024-11-13 VITALS
HEART RATE: 59 BPM | WEIGHT: 175 LBS | DIASTOLIC BLOOD PRESSURE: 64 MMHG | OXYGEN SATURATION: 99 % | BODY MASS INDEX: 32.2 KG/M2 | SYSTOLIC BLOOD PRESSURE: 122 MMHG | HEIGHT: 62 IN

## 2024-11-13 DIAGNOSIS — Z00.00 ENCOUNTER FOR MEDICARE ANNUAL WELLNESS EXAM: ICD-10-CM

## 2024-11-13 DIAGNOSIS — I10 ESSENTIAL HYPERTENSION: ICD-10-CM

## 2024-11-13 DIAGNOSIS — D12.5 ADENOMATOUS POLYP OF SIGMOID COLON: ICD-10-CM

## 2024-11-13 DIAGNOSIS — Z85.528 HISTORY OF RENAL CELL CARCINOMA: ICD-10-CM

## 2024-11-13 DIAGNOSIS — R73.03 PREDIABETES: ICD-10-CM

## 2024-11-13 DIAGNOSIS — Z85.038 PERSONAL HISTORY OF COLON CANCER: ICD-10-CM

## 2024-11-13 DIAGNOSIS — E78.5 HYPERLIPIDEMIA, UNSPECIFIED HYPERLIPIDEMIA TYPE: ICD-10-CM

## 2024-11-13 DIAGNOSIS — Z00.00 ENCOUNTER FOR MEDICARE ANNUAL WELLNESS EXAM: Primary | ICD-10-CM

## 2024-11-13 DIAGNOSIS — Z12.31 VISIT FOR SCREENING MAMMOGRAM: ICD-10-CM

## 2024-11-13 DIAGNOSIS — Z78.0 POSTMENOPAUSAL: ICD-10-CM

## 2024-11-13 LAB
ALBUMIN SERPL-MCNC: 4.9 G/DL (ref 3.2–4.8)
ALBUMIN/GLOB SERPL: 1.6 {RATIO} (ref 1–2)
ALP LIVER SERPL-CCNC: 67 U/L
ALT SERPL-CCNC: 11 U/L
ANION GAP SERPL CALC-SCNC: 7 MMOL/L (ref 0–18)
AST SERPL-CCNC: 19 U/L (ref ?–34)
BASOPHILS # BLD AUTO: 0.01 X10(3) UL (ref 0–0.2)
BASOPHILS NFR BLD AUTO: 0.1 %
BILIRUB SERPL-MCNC: 0.7 MG/DL (ref 0.2–1.1)
BUN BLD-MCNC: 13 MG/DL (ref 9–23)
BUN/CREAT SERPL: 11.1 (ref 10–20)
CALCIUM BLD-MCNC: 10.3 MG/DL (ref 8.7–10.4)
CHLORIDE SERPL-SCNC: 106 MMOL/L (ref 98–112)
CHOLEST SERPL-MCNC: 162 MG/DL (ref ?–200)
CO2 SERPL-SCNC: 31 MMOL/L (ref 21–32)
CREAT BLD-MCNC: 1.17 MG/DL
CREAT UR-SCNC: 130.4 MG/DL
DEPRECATED RDW RBC AUTO: 40.7 FL (ref 35.1–46.3)
EGFRCR SERPLBLD CKD-EPI 2021: 51 ML/MIN/1.73M2 (ref 60–?)
EOSINOPHIL # BLD AUTO: 0.09 X10(3) UL (ref 0–0.7)
EOSINOPHIL NFR BLD AUTO: 1.3 %
ERYTHROCYTE [DISTWIDTH] IN BLOOD BY AUTOMATED COUNT: 12 % (ref 11–15)
EST. AVERAGE GLUCOSE BLD GHB EST-MCNC: 128 MG/DL (ref 68–126)
FASTING PATIENT LIPID ANSWER: YES
FASTING STATUS PATIENT QL REPORTED: YES
GLOBULIN PLAS-MCNC: 3 G/DL (ref 2–3.5)
GLUCOSE BLD-MCNC: 105 MG/DL (ref 70–99)
HBA1C MFR BLD: 6.1 % (ref ?–5.7)
HCT VFR BLD AUTO: 39.8 %
HDLC SERPL-MCNC: 54 MG/DL (ref 40–59)
HGB BLD-MCNC: 13.5 G/DL
IMM GRANULOCYTES # BLD AUTO: 0.02 X10(3) UL (ref 0–1)
IMM GRANULOCYTES NFR BLD: 0.3 %
LDLC SERPL CALC-MCNC: 91 MG/DL (ref ?–100)
LYMPHOCYTES # BLD AUTO: 2.18 X10(3) UL (ref 1–4)
LYMPHOCYTES NFR BLD AUTO: 32.2 %
MCH RBC QN AUTO: 31.4 PG (ref 26–34)
MCHC RBC AUTO-ENTMCNC: 33.9 G/DL (ref 31–37)
MCV RBC AUTO: 92.6 FL
MICROALBUMIN UR-MCNC: <0.3 MG/DL
MONOCYTES # BLD AUTO: 0.49 X10(3) UL (ref 0.1–1)
MONOCYTES NFR BLD AUTO: 7.2 %
NEUTROPHILS # BLD AUTO: 3.98 X10 (3) UL (ref 1.5–7.7)
NEUTROPHILS # BLD AUTO: 3.98 X10(3) UL (ref 1.5–7.7)
NEUTROPHILS NFR BLD AUTO: 58.9 %
NONHDLC SERPL-MCNC: 108 MG/DL (ref ?–130)
OSMOLALITY SERPL CALC.SUM OF ELEC: 298 MOSM/KG (ref 275–295)
PLATELET # BLD AUTO: 303 10(3)UL (ref 150–450)
POTASSIUM SERPL-SCNC: 3.8 MMOL/L (ref 3.5–5.1)
PROT SERPL-MCNC: 7.9 G/DL (ref 5.7–8.2)
RBC # BLD AUTO: 4.3 X10(6)UL
SODIUM SERPL-SCNC: 144 MMOL/L (ref 136–145)
TRIGL SERPL-MCNC: 90 MG/DL (ref 30–149)
VLDLC SERPL CALC-MCNC: 15 MG/DL (ref 0–30)
WBC # BLD AUTO: 6.8 X10(3) UL (ref 4–11)

## 2024-11-13 PROCEDURE — 82043 UR ALBUMIN QUANTITATIVE: CPT

## 2024-11-13 PROCEDURE — G0439 PPPS, SUBSEQ VISIT: HCPCS | Performed by: FAMILY MEDICINE

## 2024-11-13 PROCEDURE — G0008 ADMIN INFLUENZA VIRUS VAC: HCPCS | Performed by: FAMILY MEDICINE

## 2024-11-13 PROCEDURE — 82570 ASSAY OF URINE CREATININE: CPT

## 2024-11-13 PROCEDURE — 80061 LIPID PANEL: CPT

## 2024-11-13 PROCEDURE — 99213 OFFICE O/P EST LOW 20 MIN: CPT | Performed by: FAMILY MEDICINE

## 2024-11-13 PROCEDURE — 36415 COLL VENOUS BLD VENIPUNCTURE: CPT

## 2024-11-13 PROCEDURE — 90662 IIV NO PRSV INCREASED AG IM: CPT | Performed by: FAMILY MEDICINE

## 2024-11-13 PROCEDURE — 83036 HEMOGLOBIN GLYCOSYLATED A1C: CPT

## 2024-11-13 PROCEDURE — 85025 COMPLETE CBC W/AUTO DIFF WBC: CPT

## 2024-11-13 PROCEDURE — 80053 COMPREHEN METABOLIC PANEL: CPT

## 2024-11-13 NOTE — PROGRESS NOTES
Subjective:   Emily Smith is a 69 year old female who presents for a Medicare Subsequent Annual Wellness visit (Pt already had Initial Annual Wellness) and stable chronic illnesses (not addressed in visit).     Lives with her two daughters since her  passed away last year.   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   Tries to exercise as often as she can with home exercise videos. Will eat two meals a day typically, but feels she gets full quickly. Does not eat a lot of bread, and eats mostly vegetables and turkey instead of red meat. Drinks a lot of water, and does not drink juice or pop.   Will monitor her blood pressure regularly, and does get some readings in the 160's systolic at times. She does try to avoid salt.  She was having daily headaches, but those have resolved.     History/Other:   Fall Risk Assessment:   She has been screened for Falls and is High Risk. Fall Prevention information provided to patient in After Visit Summary.    Do you feel unsteady when standing or walking?: No  Do you worry about falling?: Yes  Have you fallen in the past year?: No     Cognitive Assessment:   She had a completely normal cognitive assessment - see flowsheet entries     Functional Ability/Status:   Emily Smith has a completely normal functional assessment. See flowsheet for details.        Depression Screening (PHQ):  PHQ-2 SCORE: 0  , done 11/13/2024             Advanced Directives:   She does NOT have a Living Will. [Do you have a living will?: No]  She does NOT have a Power of  for Health Care. [Do you have a healthcare power of ?: No]  Discussed Advance Care Planning with patient (and family/surrogate if present). Standard forms made available to patient in After Visit Summary.      Patient Active Problem List   Diagnosis    Essential hypertension    Hyperlipidemia    Prediabetes    History of renal cell carcinoma    Adenomatous colon polyp    Personal history  of colon cancer     Allergies:  She is allergic to lisinopril, shrimp, and radiology contrast iodinated dyes.    Current Medications:  Outpatient Medications Marked as Taking for the 11/13/24 encounter (Office Visit) with Rachel Mckinley DO   Medication Sig    pravastatin 40 MG Oral Tab Take 1 tablet (40 mg total) by mouth nightly.    metoprolol succinate ER 50 MG Oral Tablet 24 Hr Take 1.5 tablets (75 mg total) by mouth every evening.    hydroCHLOROthiazide 25 MG Oral Tab Take 1 tablet (25 mg total) by mouth daily.    amLODIPine 10 MG Oral Tab Take 1 tablet (10 mg total) by mouth nightly.       Medical History:  She  has a past medical history of Anesthesia complication, Cancer of sigmoid colon (HCC) (03/2022), High blood pressure, High cholesterol, PONV (postoperative nausea and vomiting), Renal cell carcinoma (HCC) (11/2021), and Visual impairment.  Surgical History:  She  has a past surgical history that includes tubal ligation (1990); Breast Surgery (1998); [place] localization wire 1 site left (cpt=19281); colonoscopy (N/A, 8/10/2021); colonoscopy (N/A, 11/15/2021); laparoscopy, surgical; partial nephrectomy (Right, 11/23/2021); Kidney Surgery (11/23/2021); and colonoscopy (N/A, 10/20/2022).   Family History:  Her family history includes Cancer in her maternal cousin female; Cancer (age of onset: 50) in her maternal cousin female; Diabetes in her brother, half-sister, half-sister, and mother; Hypertension in her father and mother.  Social History:  She  reports that she has never smoked. She has never used smokeless tobacco. She reports that she does not drink alcohol and does not use drugs.    Tobacco:  She has never smoked tobacco.    CAGE Alcohol Screen:   CAGE screening score of 0 on 11/13/2024, showing low risk of alcohol abuse.      Patient Care Team:  Rachel Mckinley DO as PCP - General (Family Medicine)    Review of Systems  GENERAL: feels well otherwise  SKIN: denies any unusual skin lesions  EYES: denies  blurred vision or double vision  HEENT: denies nasal congestion, sinus pain or ST  LUNGS: denies shortness of breath with exertion  CARDIOVASCULAR: denies chest pain on exertion  GI: denies abdominal pain, denies heartburn, no hematochezia or melena   : denies dysuria, vaginal discharge or itching, no complaint of urinary incontinence   MUSCULOSKELETAL: denies back pain  NEURO: intermittent headaches, no dizziness   PSYCHE: denies depression or anxiety  HEMATOLOGIC: denies hx of anemia  ENDOCRINE: denies thyroid history  ALL/ASTHMA: denies hx of allergy or asthma    Objective:   Physical Exam  General Appearance:  Alert, cooperative, no distress, appears stated age   Head:  Normocephalic, without obvious abnormality, atraumatic   Eyes:  PERRL, conjunctiva/corneas clear, EOM's intact both eyes   Ears:  Normal TM's and external ear canals, both ears   Nose: Nares normal, septum midline,mucosa normal, no drainage or sinus tenderness   Throat: Lips, mucosa, and tongue normal; teeth and gums normal   Neck: Supple, symmetrical, trachea midline, no adenopathy;  thyroid: not enlarged, symmetric, no tenderness/mass/nodules; no carotid bruit or JVD   Back:   Symmetric, no curvature, ROM normal, no CVA tenderness   Lungs:   Clear to auscultation bilaterally, respirations unlabored   Heart:  Regular rate and rhythm, S1 and S2 normal, no murmur, rub, or gallop   Abdomen:   Soft, non-tender, bowel sounds active all four quadrants,  no masses, no organomegaly   Pelvic: Deferred   Extremities: Extremities normal, atraumatic, no cyanosis or edema   Pulses: 2+ and symmetric   Skin: Skin color, texture, turgor normal, no rashes or lesions   Lymph nodes: Cervical, supraclavicular, and axillary nodes normal   Neurologic: Normal       /64 (BP Location: Right arm)   Pulse 59   Ht 5' 2\" (1.575 m)   Wt 175 lb (79.4 kg)   SpO2 99%   BMI 32.01 kg/m²  Estimated body mass index is 32.01 kg/m² as calculated from the following:     Height as of this encounter: 5' 2\" (1.575 m).    Weight as of this encounter: 175 lb (79.4 kg).    Medicare Hearing Assessment:   Hearing Screening    Time taken: 11/13/2024  9:22 AM  Entry User: Xin Pollard  Screening Method: Finger Rub  Finger Rub Result: Pass           Assessment & Plan:   Emily Smith is a 69 year old female who presents for a Medicare Assessment.     1. Encounter for Medicare annual wellness exam (Primary)  -     CBC With Differential With Platelet; Future; Expected date: 11/13/2024  -     Microalb/Creat Ratio, Random Urine; Future; Expected date: 11/13/2024  2. Hyperlipidemia, unspecified hyperlipidemia type  -     Lipid Panel; Future; Expected date: 11/13/2024  3. Essential hypertension  -     Comp Metabolic Panel (14); Future; Expected date: 11/13/2024  4. Prediabetes  -     Hemoglobin A1C; Future; Expected date: 11/13/2024  5. Adenomatous polyp of sigmoid colon  6. Personal history of colon cancer  7. History of renal cell carcinoma  -     Urology Referral - In Network  8. Visit for screening mammogram  -     [facility] MARI 2D+3D SCREENING BILAT (CPT=77067/70920); Future; Expected date: 11/13/2024  9. Postmenopausal  -     XR DEXA BONE DENSITOMETRY (CPT=77080); Future; Expected date: 11/13/2024  Other orders  -     INFLUENZA VAC HIGH DOSE PRSV FREE    Continue pravastatin for hyperlipidemia, and repeat lipid panel with labs today.  Blood pressure well-controlled on exam today, but reports some elevated readings and headaches at home.  Will call for nurse visit if blood pressure becomes elevated again or headaches return.  Continue amlodipine, hydrochlorothiazide, and metoprolol.  Continue to work on eating better and exercising regularly for prediabetes.  Due for repeat colonoscopy in 2027 due to benign colon polyp and history of colon cancer.  Referral to urology given for follow-up due to history of renal cell carcinoma status post partial nephrectomy.     The patient indicates  understanding of these issues and agrees to the plan.  Imaging studies ordered.  Lab work ordered.  Reinforced healthy diet, lifestyle, and exercise.      Return in about 1 year (around 11/13/2025) for Medicare physical .     Rachel Mckinley DO, 11/13/2024     Supplementary Documentation:   General Health:  In the past six months, have you lost more than 10 pounds without trying?: 2 - No  Has your appetite been poor?: No  Type of Diet: Low Salt  How does the patient maintain a good energy level?: Daily Walks  How would you describe your daily physical activity?: Moderate  How would you describe your current health state?: Fair  How do you maintain positive mental well-being?: Puzzles;Visiting Friends  On a scale of 0 to 10, with 0 being no pain and 10 being severe pain, what is your pain level?: 0 - (None)  In the past six months, have you experienced urine leakage?: 0-No  At any time do you feel concerned for the safety/well-being of yourself and/or your children, in your home or elsewhere?: No  Have you had any immunizations at another office such as Influenza, Hepatitis B, Tetanus, or Pneumococcal?: No    Health Maintenance   Topic Date Due    DEXA Scan  Never done    Mammogram  05/12/2024    COVID-19 Vaccine (5 - 2024-25 season) 09/01/2024    Annual Physical  09/20/2024    Influenza Vaccine (1) 10/01/2024    Colorectal Cancer Screening  10/20/2027    Annual Depression Screening  Completed    Fall Risk Screening (Annual)  Completed    Pneumococcal Vaccine: 65+ Years  Completed    Zoster Vaccines  Completed

## 2024-11-26 ENCOUNTER — HOSPITAL ENCOUNTER (OUTPATIENT)
Dept: MAMMOGRAPHY | Age: 69
Discharge: HOME | End: 2024-11-26
Attending: FAMILY MEDICINE
Payer: MEDICARE

## 2024-11-26 DIAGNOSIS — Z12.31 VISIT FOR SCREENING MAMMOGRAM: ICD-10-CM

## 2024-11-26 PROCEDURE — 77067 SCR MAMMO BI INCL CAD: CPT | Performed by: FAMILY MEDICINE

## 2024-11-26 PROCEDURE — 77063 BREAST TOMOSYNTHESIS BI: CPT | Performed by: FAMILY MEDICINE

## 2024-12-12 ENCOUNTER — APPOINTMENT (OUTPATIENT)
Dept: GENERAL RADIOLOGY | Age: 69
End: 2024-12-12
Attending: NURSE PRACTITIONER
Payer: MEDICARE

## 2024-12-12 ENCOUNTER — HOSPITAL ENCOUNTER (OUTPATIENT)
Age: 69
Discharge: HOME | End: 2024-12-12
Payer: MEDICARE

## 2024-12-12 VITALS
SYSTOLIC BLOOD PRESSURE: 153 MMHG | HEART RATE: 50 BPM | RESPIRATION RATE: 20 BRPM | TEMPERATURE: 98 F | DIASTOLIC BLOOD PRESSURE: 60 MMHG | OXYGEN SATURATION: 100 %

## 2024-12-12 DIAGNOSIS — M25.552 PAIN OF LEFT HIP: Primary | ICD-10-CM

## 2024-12-12 PROCEDURE — 73502 X-RAY EXAM HIP UNI 2-3 VIEWS: CPT | Performed by: NURSE PRACTITIONER

## 2024-12-12 RX ORDER — LIDOCAINE 50 MG/G
1 PATCH TOPICAL EVERY 24 HOURS
Qty: 7 PATCH | Refills: 0 | Status: SHIPPED | OUTPATIENT
Start: 2024-12-12

## 2024-12-12 NOTE — DISCHARGE INSTRUCTIONS
Continue over-the-counter Tylenol for pain you may alternate heat or ice to area of discomfort start the lidocaine patches as prescribed this medication sometimes is not covered by insurance if the insurance does not cover the lidocaine patch you may purchase over-the-counter lidocaine patch take the muscle relaxer as prescribed will cause drowsiness no driving or operating machinery if you have to be out during the day take it at bedtime and get 8 hours of sleep.  If all of a sudden you have inner thigh posterior knee or calf pain or develop any new or worsening symptoms any numbness or tingling in the foot inability to ambulate toes are and not pink cannot feel sensation go to the nearest emergency department otherwise follow-up with your primary care provider in a week

## 2024-12-12 NOTE — ED PROVIDER NOTES
Patient Seen in: [facility]      History     Chief Complaint   Patient presents with    Hip Pain     Stated Complaint: LEFT LEG PAIN    Subjective:   HPI    This is a 69-year-old female with a history of hypertension high cholesterol carcinoma presenting with left hip and outer leg pain.  Patient states she has been having left hip pain and outer leg pain for about a week has been taking Tylenol with little relief in pain.  Patient states it seems to be a little bit worse with walking and it hurts with touch.  Denies falling or landing on the hip or leg.  Denies numbness or tingling in the extremity.  Denies history of DVT or PE denies history of chest pain shortness of breath denies any calf pain posterior knee pain or inner thigh or leg pain.      Objective:     Past Medical History:    Anesthesia complication    Cancer of sigmoid colon (HCC)    High blood pressure    High cholesterol    PONV (postoperative nausea and vomiting)    Renal cell carcinoma (HCC)    Visual impairment    glasses              Past Surgical History:   Procedure Laterality Date    Breast surgery  1998    Benign breast cyst    Colonoscopy N/A 8/10/2021    Procedure: COLONOSCOPY;  Surgeon: ROSALINA Mccray MD;  Location: [facility] ENDOSCOPY    Colonoscopy N/A 11/15/2021    Procedure: COLONOSCOPY;  Surgeon: Juan Mahan MD;  Location: [facility] ENDOSCOPY    Colonoscopy N/A 10/20/2022    Procedure: COLONOSCOPY;  Surgeon: Orlando Dumas MD;  Location: [facility] ENDO    Kidney surgery  11/23/2021    Tumor removed (Right side)    Laparoscopy, surgical; partial nephrectomy Right 11/23/2021    right robotic partial nephrectomy with intraoperative ultrasound, Dr. Villegas @ [facility] localization wire 1 site left (cpt=19281)      1998    Tubal ligation  1990                Social History     Socioeconomic History    Marital status:    Tobacco Use    Smoking status: Never    Smokeless tobacco: Never   Vaping Use    Vaping status: Never  Used   Substance and Sexual Activity    Alcohol use: Never    Drug use: Never    Sexual activity: Yes     Partners: Male   Social History Narrative    Emily is  to Yesy x 46 yrs. Mother of 3 adult children. She works part time in home health care. Patient lives with  and two of her daughters in [place], [place].     Social Drivers of Health      [facility]    [facility]ctions    [facility]    Housing Stability              Review of Systems    Positive for stated complaint: LEFT LEG PAIN  Other systems are as noted in HPI.  Constitutional and vital signs reviewed.      All other systems reviewed and negative except as noted above.    Physical Exam     ED Triage Vitals [12/12/24 0948]   /60   Pulse 50   Resp 20   Temp 97.7 °F (36.5 °C)   Temp src Oral   SpO2 100 %   O2 Device None (Room air)       Current Vitals:   Vital Signs  BP: 153/60  Pulse: 50  Resp: 20  Temp: 97.7 °F (36.5 °C)  Temp src: Oral    Oxygen Therapy  SpO2: 100 %  O2 Device: None (Room air)        Physical Exam  Vitals and nursing note reviewed.   Constitutional:       Appearance: Normal appearance.   HENT:      Right Ear: Tympanic membrane and external ear normal.      Left Ear: External ear normal.      Nose: Nose normal.      Mouth/Throat:      Mouth: Mucous membranes are moist.      Pharynx: Oropharynx is clear.   Eyes:      Conjunctiva/sclera: Conjunctivae normal.   Cardiovascular:      Rate and Rhythm: Normal rate.   Pulmonary:      Effort: Pulmonary effort is normal.   Musculoskeletal:         General: Normal range of motion.      Cervical back: Normal range of motion.        Legs:       Comments: No unilateral lower extremity swelling no left calf redness swelling or warmth no left posterior kneeTTP no hamstring TTP or inner groin or thigh TTP  DPs 2+ bilaterally normal ROM right lower  extremity and no reproducible TTP   Skin:     General: Skin is warm.      Capillary Refill: Capillary refill takes less than 2 seconds.   Neurological:      General: No focal deficit present.      Mental Status: She is alert and oriented to person, place, and time.             ED Course   Labs Reviewed - No data to display  XR HIP W OR WO PELVIS 2 OR 3 VIEWS, LEFT (CPT=73502)    Result Date: 12/12/2024  CONCLUSION: Normal examination.     Dictated by (CST): Francisco Ge MD on 12/12/2024 at 10:18 AM     Finalized by (CST): Francisco Ge MD on 12/12/2024 at 10:18 AM                MDM         Medical Decision Making  69-year-old female well-appearing nontoxic ambulatory with a steady gait presenting with left hip and outer leg pain for a week pain is reproducible on exam and complaints of pain with ambulating no calf posterior knee or hamstring or inner thigh pain.  DDx radicular pain versus arthralgia versus hip strain or sprain versus contusion.  No clinical indication for labs or advanced imaging but x-ray of the hip will be ordered and a lidocaine patch will be applied to the skin.  Discussed this with the patient and she is agreeable with this plan of care.    X-ray independently viewed by this provider no fractures noted    Discussed results with the patient discussed possible muscle strain or sprain or radicular pain discussed if she develops any inner thigh hamstring posterior knee or calf pain develops any numbness or tingling to return to the [facility] or go to the nearest emergency department as this would be concerning for a DVT but that is not a concern today given her pain is in her left hip and outer leg.  We did discuss possibility of ultrasound as it is available patient is agreeable with no ultrasound at this time.  Discussed with the patient treatment with tizanidine low-dose muscle relaxer and the side effect of drowsiness was discussed discussed application of a lidocaine patch now here in the [facility]  and also will prescribe lidocaine patch.  Patient was made aware lidocaine patches are sometimes not approved by insurance if not it is over-the-counter and to pick it up and to continue it for pain discussed continuing Tylenol outpatient follow-up with primary care provider and strict ER precautions were discussed.  Patient feels comfortable with the plan of care and acknowledges understanding discharge instructions.    Problems Addressed:  Pain of left hip: acute illness or injury    Amount and/or Complexity of Data Reviewed  Radiology: ordered and independent interpretation performed. Decision-making details documented in ED Course.    Risk  OTC drugs.  Prescription drug management.        Disposition and Plan     Clinical Impression:  1. Pain of left hip         Disposition:  Discharge  12/12/2024 10:26 am    Follow-up:  Rachel Mckinley DO  [address] [address]  [phone]    In 1 week            Medications Prescribed:  Discharge Medication List as of 12/12/2024 10:36 AM        START taking these medications    Details   lidocaine 5 % External Patch Place 1 patch onto the skin daily., Normal, Disp-7 patch, R-0      tiZANidine 4 MG Oral Tab Take 1 tablet (4 mg total) by mouth 2 (two) times daily as needed (May cause drowsiness no driving or operating machinery while taking this medication)., Normal, Disp-6 tablet, R-0                 Supplementary Documentation:

## 2024-12-12 NOTE — ED INITIAL ASSESSMENT (HPI)
Pt here with complaints of left hip pain that began 1 week ago , pt denies any injury , pt states the pain is worse when walking

## 2025-01-27 RX ORDER — AMLODIPINE BESYLATE 10 MG/1
10 TABLET ORAL NIGHTLY
Qty: 90 TABLET | Refills: 2 | Status: SHIPPED | OUTPATIENT
Start: 2025-01-27

## 2025-02-05 ENCOUNTER — NURSE TRIAGE (OUTPATIENT)
Facility: CLINIC | Age: 70
End: 2025-02-05

## 2025-02-05 NOTE — TELEPHONE ENCOUNTER
Action Requested: Summary for Provider     []  Critical Lab, Recommendations Needed  [] Need Additional Advice  []   FYI    []   Need Orders  [] Need Medications Sent to Pharmacy  []  Other     SUMMARY: Disposition per protocol  is to be seen in office within 3 days:  Future Appointments   Date Time Provider Department Center   2/7/2025  9:30 AM ElíasRachel  [facility]       Reason for call: Blood Pressure  Onset:  1 week  Patient calling,verified name and date of birth.   Home Blood pressure  has been higher with intermittent headaches x 1 week. She is at work and doesn't have BP log available. She denies chest pain, shortness of breath, weakness numbness or tingling. Speech is clear and coherent. She is familiar with correct BP monitoring and has checked her monitor at the office. Reviewed care advice to keep appointment as scheduled, continue to monitor BP at home and all back if numbers are above her baseline or  if symptoms occur. Patient verbalizes understanding and agrees to plan of care.   Reason for Disposition   Systolic BP >= 160 OR Diastolic >= 100    Protocols used: Blood Pressure - High-A-OH

## 2025-02-06 ENCOUNTER — TELEPHONE (OUTPATIENT)
Facility: CLINIC | Age: 70
End: 2025-02-06

## 2025-02-06 NOTE — TELEPHONE ENCOUNTER
Outgoing call to patient to inform we are out of network with [facility] and appointment had to be cancel .  Patient requested to speak with a nurse .  Call was transfer to triage nurse .

## 2025-02-06 NOTE — TELEPHONE ENCOUNTER
Patient states her current insurance card states PCP Dr. Mckinley.  Patient states she recently applied for this insurance.  Spoke with Alida,, per Rylie Irwin insurance is out of network with Dr. Mckinley unfortunately, having Dr. Mckinley as PCP on card does not mean Dr. Mckinley will take her insurance.    Recommend to contact her insurance for further assistance and inquire of PCP in network.

## 2025-02-06 NOTE — TELEPHONE ENCOUNTER
Spoke with Patient and provided her with an update.  Patient states she also called her insurance and verified Dr. Mckinley is not in network.  Patient states she will seek new primary care provider in network.  Advised Immediate care for symptoms.

## 2025-03-05 ENCOUNTER — APPOINTMENT (OUTPATIENT)
Dept: ULTRASOUND IMAGING | Facility: HOSPITAL | Age: 70
End: 2025-03-05
Attending: EMERGENCY MEDICINE
Payer: MEDICARE

## 2025-03-05 ENCOUNTER — HOSPITAL ENCOUNTER (OUTPATIENT)
Facility: HOSPITAL | Age: 70
Setting detail: OBSERVATION
Discharge: HOME | End: 2025-03-08
Attending: EMERGENCY MEDICINE | Admitting: HOSPITALIST
Payer: MEDICARE

## 2025-03-05 DIAGNOSIS — G89.18 POST-OP PAIN: ICD-10-CM

## 2025-03-05 DIAGNOSIS — K80.00 CALCULUS OF GALLBLADDER WITH ACUTE CHOLECYSTITIS WITHOUT OBSTRUCTION: Primary | ICD-10-CM

## 2025-03-05 DIAGNOSIS — E87.6 HYPOKALEMIA: ICD-10-CM

## 2025-03-05 DIAGNOSIS — K81.9 CHOLECYSTITIS: ICD-10-CM

## 2025-03-05 PROBLEM — K81.0 ACUTE CHOLECYSTITIS: Status: ACTIVE | Noted: 2025-03-05

## 2025-03-05 LAB
ALBUMIN SERPL-MCNC: 4.9 G/DL (ref 3.2–4.8)
ALBUMIN/GLOB SERPL: 1.6 {RATIO} (ref 1–2)
ALP LIVER SERPL-CCNC: 56 U/L
ALT SERPL-CCNC: 11 U/L
ANION GAP SERPL CALC-SCNC: 12 MMOL/L (ref 0–18)
AST SERPL-CCNC: 22 U/L (ref ?–34)
BASOPHILS # BLD AUTO: 0.01 X10(3) UL (ref 0–0.2)
BASOPHILS NFR BLD AUTO: 0.1 %
BILIRUB SERPL-MCNC: 0.6 MG/DL (ref 0.2–1.1)
BUN BLD-MCNC: 14 MG/DL (ref 9–23)
BUN/CREAT SERPL: 13.3 (ref 10–20)
CALCIUM BLD-MCNC: 9.6 MG/DL (ref 8.7–10.4)
CHLORIDE SERPL-SCNC: 102 MMOL/L (ref 98–112)
CO2 SERPL-SCNC: 25 MMOL/L (ref 21–32)
CREAT BLD-MCNC: 1.05 MG/DL
DEPRECATED RDW RBC AUTO: 39.1 FL (ref 35.1–46.3)
EGFRCR SERPLBLD CKD-EPI 2021: 58 ML/MIN/1.73M2 (ref 60–?)
EOSINOPHIL # BLD AUTO: 0 X10(3) UL (ref 0–0.7)
EOSINOPHIL NFR BLD AUTO: 0 %
ERYTHROCYTE [DISTWIDTH] IN BLOOD BY AUTOMATED COUNT: 12.1 % (ref 11–15)
GLOBULIN PLAS-MCNC: 3 G/DL (ref 2–3.5)
GLUCOSE BLD-MCNC: 143 MG/DL (ref 70–99)
HCT VFR BLD AUTO: 38.5 %
HGB BLD-MCNC: 13.8 G/DL
IMM GRANULOCYTES # BLD AUTO: 0.05 X10(3) UL (ref 0–1)
IMM GRANULOCYTES NFR BLD: 0.4 %
LIPASE SERPL-CCNC: 27 U/L (ref 12–53)
LYMPHOCYTES # BLD AUTO: 0.98 X10(3) UL (ref 1–4)
LYMPHOCYTES NFR BLD AUTO: 8.5 %
MCH RBC QN AUTO: 31.4 PG (ref 26–34)
MCHC RBC AUTO-ENTMCNC: 35.8 G/DL (ref 31–37)
MCV RBC AUTO: 87.5 FL
MONOCYTES # BLD AUTO: 0.29 X10(3) UL (ref 0.1–1)
MONOCYTES NFR BLD AUTO: 2.5 %
NEUTROPHILS # BLD AUTO: 10.2 X10 (3) UL (ref 1.5–7.7)
NEUTROPHILS # BLD AUTO: 10.2 X10(3) UL (ref 1.5–7.7)
NEUTROPHILS NFR BLD AUTO: 88.5 %
OSMOLALITY SERPL CALC.SUM OF ELEC: 291 MOSM/KG (ref 275–295)
PLATELET # BLD AUTO: 278 10(3)UL (ref 150–450)
POTASSIUM SERPL-SCNC: 3.1 MMOL/L (ref 3.5–5.1)
PROT SERPL-MCNC: 7.9 G/DL (ref 5.7–8.2)
RBC # BLD AUTO: 4.4 X10(6)UL
SODIUM SERPL-SCNC: 139 MMOL/L (ref 136–145)
WBC # BLD AUTO: 11.5 X10(3) UL (ref 4–11)

## 2025-03-05 PROCEDURE — 76705 ECHO EXAM OF ABDOMEN: CPT | Performed by: EMERGENCY MEDICINE

## 2025-03-05 PROCEDURE — 99222 1ST HOSP IP/OBS MODERATE 55: CPT | Performed by: HOSPITALIST

## 2025-03-05 RX ORDER — SODIUM CHLORIDE 9 MG/ML
INJECTION, SOLUTION INTRAVENOUS CONTINUOUS
Status: DISCONTINUED | OUTPATIENT
Start: 2025-03-05 | End: 2025-03-07

## 2025-03-05 RX ORDER — ONDANSETRON 2 MG/ML
4 INJECTION INTRAMUSCULAR; INTRAVENOUS EVERY 6 HOURS PRN
Status: DISCONTINUED | OUTPATIENT
Start: 2025-03-05 | End: 2025-03-08

## 2025-03-05 RX ORDER — ONDANSETRON 2 MG/ML
4 INJECTION INTRAMUSCULAR; INTRAVENOUS ONCE
Status: COMPLETED | OUTPATIENT
Start: 2025-03-05 | End: 2025-03-05

## 2025-03-05 RX ORDER — MORPHINE SULFATE 2 MG/ML
1 INJECTION, SOLUTION INTRAMUSCULAR; INTRAVENOUS EVERY 2 HOUR PRN
Status: DISCONTINUED | OUTPATIENT
Start: 2025-03-05 | End: 2025-03-07

## 2025-03-05 RX ORDER — MORPHINE SULFATE 4 MG/ML
4 INJECTION, SOLUTION INTRAMUSCULAR; INTRAVENOUS EVERY 2 HOUR PRN
Status: DISCONTINUED | OUTPATIENT
Start: 2025-03-05 | End: 2025-03-07

## 2025-03-05 RX ORDER — METRONIDAZOLE 500 MG/100ML
500 INJECTION, SOLUTION INTRAVENOUS EVERY 12 HOURS
Status: DISCONTINUED | OUTPATIENT
Start: 2025-03-05 | End: 2025-03-08

## 2025-03-05 RX ORDER — DEXTROSE MONOHYDRATE AND SODIUM CHLORIDE 5; .45 G/100ML; G/100ML
INJECTION, SOLUTION INTRAVENOUS CONTINUOUS
Status: ACTIVE | OUTPATIENT
Start: 2025-03-05 | End: 2025-03-05

## 2025-03-05 RX ORDER — ATORVASTATIN CALCIUM 10 MG/1
10 TABLET, FILM COATED ORAL NIGHTLY
Status: DISCONTINUED | OUTPATIENT
Start: 2025-03-05 | End: 2025-03-08

## 2025-03-05 RX ORDER — METOCLOPRAMIDE HYDROCHLORIDE 5 MG/ML
5 INJECTION INTRAMUSCULAR; INTRAVENOUS EVERY 8 HOURS PRN
Status: DISCONTINUED | OUTPATIENT
Start: 2025-03-05 | End: 2025-03-08

## 2025-03-05 RX ORDER — TEMAZEPAM 15 MG/1
15 CAPSULE ORAL NIGHTLY PRN
Status: DISCONTINUED | OUTPATIENT
Start: 2025-03-05 | End: 2025-03-07

## 2025-03-05 RX ORDER — MORPHINE SULFATE 2 MG/ML
2 INJECTION, SOLUTION INTRAMUSCULAR; INTRAVENOUS EVERY 2 HOUR PRN
Status: DISCONTINUED | OUTPATIENT
Start: 2025-03-05 | End: 2025-03-07

## 2025-03-05 RX ORDER — ONDANSETRON 2 MG/ML
4 INJECTION INTRAMUSCULAR; INTRAVENOUS EVERY 4 HOURS PRN
Status: DISCONTINUED | OUTPATIENT
Start: 2025-03-05 | End: 2025-03-05

## 2025-03-05 RX ORDER — AMLODIPINE BESYLATE 10 MG/1
10 TABLET ORAL NIGHTLY
Status: DISCONTINUED | OUTPATIENT
Start: 2025-03-05 | End: 2025-03-08

## 2025-03-05 RX ORDER — METRONIDAZOLE 500 MG/100ML
500 INJECTION, SOLUTION INTRAVENOUS ONCE
Status: COMPLETED | OUTPATIENT
Start: 2025-03-05 | End: 2025-03-05

## 2025-03-05 NOTE — H&P
[facility]    PATIENT'S NAME: WATSON BALDWIN   ATTENDING PHYSICIAN: Miguel Montalvo MD   PATIENT ACCOUNT#:   [id number]    LOCATION:  [address]  MEDICAL RECORD #:   [id number]       YOB: 1955  ADMISSION DATE:       03/05/2025    HISTORY AND PHYSICAL EXAMINATION    CHIEF COMPLAINT:  Acute cholecystitis.    HISTORY OF PRESENT ILLNESS:  Patient is a 69-year-old  female, who came into the emergency department for evaluation of 3 days of epigastric abdominal pain associated with nausea and vomiting.  CBC showed white blood cell count of 11.5 with left shift.  Chemistry and liver function tests roughly unremarkable.  Lipase was negative.  Gallbladder ultrasound showed acute cholecystitis, no biliary ductal dilation.  Patient was started on IV antibiotics in the emergency room, and she will be admitted to the hospital for further management.     PAST MEDICAL HISTORY:  Hypertension, hyperlipidemia, generalized osteoarthritis.    PAST SURGICAL HISTORY:  Left hemicolectomy and sigmoidectomy for colon cancer, right partial nephrectomy for renal cell carcinoma, left breast biopsy and tubal ligation.    MEDICATIONS:  Please see medication reconciliation list.     ALLERGIES:  Lisinopril, shrimp, and radiology contrast.    FAMILY HISTORY:  Positive for diabetes mellitus type 2 and hypertension.    SOCIAL HISTORY:  No tobacco, alcohol, or drug use.      REVIEW OF SYSTEMS:  Epigastric abdominal pain associated with nausea and vomiting after she ate roast beef 3 days ago.  She had subjective fevers at home.  Other 12-point review of systems is negative.       PHYSICAL EXAMINATION:    GENERAL:  Alert and oriented to time, place and person.  Mild to moderate distress.   VITAL SIGNS:  Temperature 97.9, pulse 89, respiratory rate 20, blood pressure 146/65, pulse ox 98% on room air.  HEENT:  Atraumatic.  Oropharynx clear.  Moist mucous membranes.  Ears and nose normal.  Eyes:   Anicteric sclerae.   NECK:  Supple.  No lymphadenopathy.    LUNGS:  Clear to auscultation bilaterally.  Normal respiratory effort.    HEART:  Regular rate and rhythm.  S1 and S2 auscultated.  No murmur.    ABDOMEN:  Soft, nondistended.  Tenderness, epigastric and right upper quadrant area.  No guarding or rebound tenderness.    EXTREMITIES:  No peripheral edema, clubbing or cyanosis.   NEUROLOGIC:  Motor and sensory intact.    ASSESSMENT:  Acute cholecystitis.    PLAN:  Patient will be admitted to general medical floor.  Pain control.  IV fluids.  IV antibiotics, Rocephin and Flagyl.  N.p.o.  General surgery consult for laparoscopic cholecystectomy.  Further recommendations to follow.     Dictated By Lisseth Izaguirre MD  d: 03/05/2025 13:21:37  t: 03/05/2025 13:31:12  Job [id number]  FB/

## 2025-03-05 NOTE — ED PROVIDER NOTES
Patient Seen in: [facility] Emergency Department    History     Chief Complaint   Patient presents with    Nausea/Vomiting/Diarrhea     Stated Complaint: Vomiting     HPI    69-year-old female with past medical history of sigmoid colon cancer status postresection, renal cell carcinoma status post resection, hypertension, dyslipidemia presenting with daughter for evaluation of epigastric pain associated nausea/vomiting over the past 3 days.  No sick contacts recent travel.  No new food ingestions or recent antibiotics.  No exertional complaints or chest pain/shortness of breath.  Significant for small bowel leak.  No radiation of pain, no back pain or tearing sensation.  EMR reviewed, 6/8/2023 CT ab/pelvis notable for cholelithiasis; patient without postprandial worsening of symptoms as above.    Past Medical History:    Anesthesia complication    Cancer of sigmoid colon (HCC)    High blood pressure    High cholesterol    PONV (postoperative nausea and vomiting)    Renal cell carcinoma (HCC)    Visual impairment    glasses       Past Surgical History:   Procedure Laterality Date    Breast surgery  1998    Benign breast cyst    Colonoscopy N/A 8/10/2021    Procedure: COLONOSCOPY;  Surgeon: ROSALINA Mccray MD;  Location: [facility] ENDOSCOPY    Colonoscopy N/A 11/15/2021    Procedure: COLONOSCOPY;  Surgeon: Juan Mahan MD;  Location: [facility] ENDOSCOPY    Colonoscopy N/A 10/20/2022    Procedure: COLONOSCOPY;  Surgeon: Orlando Dumas MD;  Location: [facility] ENDO    Kidney surgery  11/23/2021    Tumor removed (Right side)    Laparoscopy, surgical; partial nephrectomy Right 11/23/2021    right robotic partial nephrectomy with intraoperative ultrasound, Dr. Villegas @ [facility] localization wire 1 site left (cpt=19281)      1998    Tubal ligation  1990            Family History   Problem Relation Age of Onset    Colon Cancer Self     Renal Disease Self     Diabetes Mother     Hypertension Mother     Hypertension Father      Diabetes Brother     Diabetes Half-Sister     Diabetes Half-Sister     Cancer Maternal Cousin Female 50        breast cancer    Cancer Maternal Cousin Female         unknown cancer    Bleeding Disorders Neg     Sickle Cell Neg     DVT/VTE Neg        Social History     Socioeconomic History    Marital status:    Tobacco Use    Smoking status: Never    Smokeless tobacco: Never   Vaping Use    Vaping status: Never Used   Substance and Sexual Activity    Alcohol use: Never    Drug use: Never    Sexual activity: Yes     Partners: Male   Social History Narrative    Emily is  to Yesy x 46 yrs. Mother of 3 adult children. She works part time in home health care. Patient lives with  and two of her daughters in [place], [place].     Social Drivers of Health      Received from [facility], [facility]    Housing Stability       Review of Systems :  Constitutional: As per HPI  Gastrointestinal: (+)vomiting and abdominal pain.   Genitourinary: Negative for dysuria and hematuria.       Positive for stated complaint: Vomiting  Other systems are as noted in HPI.  Constitutional and vital signs reviewed.      All other systems reviewed and negative except as noted above.    PSFH elements reviewed from today and agreed except as otherwise stated in HPI.    Physical Exam     ED Triage Vitals [03/05/25 0854]   /65   Pulse 89   Resp 20   Temp 97.9 °F (36.6 °C)   Temp src Oral   SpO2 98 %   O2 Device None (Room air)       Current:/65   Pulse 89   Temp 97.9 °F (36.6 °C) (Oral)   Resp 20   SpO2 98%         Physical Exam   Constitutional: No distress.   HEENT: MMM.  Head: Normocephalic.   Eyes: No injection.   Cardiovascular: RRR.   Pulmonary/Chest: Effort normal. CTAB.  Abdominal: Soft.  RUQ/epigastric tenderness without peritonitis.  Musculoskeletal: No gross deformity.  Neurological: Alert.   Skin: Skin is warm.   Psychiatric: Cooperative.  Nursing note and vitals  reviewed.        ED Course     Labs Reviewed   CBC WITH DIFFERENTIAL WITH PLATELET - Abnormal; Notable for the following components:       Result Value    WBC 11.5 (*)     Neutrophil Absolute Prelim 10.20 (*)     Neutrophil Absolute 10.20 (*)     Lymphocyte Absolute 0.98 (*)     All other components within normal limits   COMP METABOLIC PANEL (14) - Abnormal; Notable for the following components:    Glucose 143 (*)     Potassium 3.1 (*)     Creatinine 1.05 (*)     eGFR-Cr 58 (*)     Albumin 4.9 (*)     All other components within normal limits   LIPASE - Normal     US GALLBLADDER (CPT=76705)    Result Date: 3/5/2025  PROCEDURE: US GALLBLADDER (CPT=76705)  COMPARISON: [facility], CT ABDOMEN + PELVIS (ALL W+WO) (CPT=74178), 6/08/2023, 3:12 PM.  INDICATIONS: Vomiting, abdominal pain  TECHNIQUE:   The gallbladder was evaluated with grayscale ultrasound.   FINDINGS:  GALLBLADDER:   The gallbladder is distended.  There are multiple gallstones measuring up to 2.3 cm.  The gallbladder wall is mildly thickened and measures 4. The Frank's sign was reported positive by the sonographer.  No pericholecystic fluid. BILE DUCTS:   Normal.  Common bile duct measures 4 mm.  OTHER:   Postoperative changes from prior partial nephrectomy involving the right upper pole.  The right kidney measures 7.1 cm in length.  No right hydronephrosis.         CONCLUSION:   Acute cholecystitis.  No biliary ductal dilatation.  Postoperative changes from prior right upper pole partial nephrectomy.  No right hydronephrosis.    Dictated by (CST): Jose Gandara MD on 3/05/2025 at 12:15 PM     Finalized by (CST): Jose Gandara MD on 3/05/2025 at 12:18 PM             ED Course as of 03/05/25 1346  ------------------------------------------------------------  Time: 03/05 1234  Comment: Pain improving, nausea ongoing in nontoxic/well-appearing and HDS patient with soft abdomen - advised of workup and plan for admission/surgical  management.       MDM   DIFFERENTIAL DIAGNOSIS: After history and physical exam differential diagnosis includes but is not limited to jaundice, gastritis, hepatobiliary disease, electrolyte derangement, CONSUELO.    Pulse ox: 98%:Normal on RA, as independently interpreted by myself    Medical Decision Making  Evaluation for upper abdominal pain associated nausea/vomiting without fevers or peritonitis and without chest pain/shortness of breath or exertional complaints.  Labs with mild hypokalemia and borderline leukocytosis in afebrile patient - US as noted for which patient advised of same, antibiotics initiated and will admit for ongoing management including surgical evaluation/management; case d/w hospitalist Dr. Izaguirre for admission, surgery Dr. Slade.    Problems Addressed:  Calculus of gallbladder with acute cholecystitis without obstruction: acute illness or injury  Hypokalemia: acute illness or injury    Amount and/or Complexity of Data Reviewed  External Data Reviewed: labs, radiology and notes.     Details: 6/8/2023 CT abd/pelvis results, 11/2024 CMP results/PCP note reviewed  Labs: ordered. Decision-making details documented in ED Course.  Radiology: ordered and independent interpretation performed. Decision-making details documented in ED Course.     Details: US with cholelithiasis as independently interpreted by myself  Discussion of management or test interpretation with external provider(s): Case d/w hospitalist coverage Dr. Izaguirre for admission, surgery Dr. Slade    Risk  Prescription drug management.  Decision regarding hospitalization.  Elective major surgery with identified risk factors.      I was wearing at minimum a facemask and eye protection throughout this encounter with handwashing performed prior and after patient evaluation without personal hand/facial/oropharyngeal contact and gloves worn throughout encounter. See note and/or contact this provider for further PPE details.    We recommend  that you schedule follow up care with a primary care provider within the next three months to obtain basic health screening including reassessment of your blood pressure.      You had elevated blood pressure today and you need to follow up with your doctor for a repeat blood pressure check and further discussion of lifestyle modifications that include Weight Reduction - Dietary Sodium Restriction - Increased Physical Activity and Moderation in alcohol (ETOH) Consumption. If possible check your pressure at home and keep a blood pressure log to bring to your physician.  Disposition and Plan     Clinical Impression:  1. Calculus of gallbladder with acute cholecystitis without obstruction    2. Hypokalemia        Disposition:  Admit    Follow-up:  No follow-up provider specified.    Medications Prescribed:  Current Discharge Medication List

## 2025-03-05 NOTE — ED QUICK NOTES
Orders for admission, patient is aware of plan and ready to go upstairs. Any questions, please call ED RN Roque at extension [phone].     Patient Covid vaccination status: Fully vaccinated     COVID Test Ordered in ED: None    COVID Suspicion at Admission: N/A    Running Infusions:      Mental Status/LOC at time of transport: A&Ox4    Other pertinent information:   CIWA score: N/A   NIH score:  N/A

## 2025-03-05 NOTE — CONSULTS
[facility]  part of [facility]    Report of Consultation    Emily Smith Patient Status:  Observation    3/30/1955 MRN [id number]   Location [facility] 4W/SW/SE Attending Lisseth Izaguirre MD   Hosp Day # 0 PCP Rachel Mckinley DO     Date of Admission:  3/5/2025  Date of Consult:  3/5/2025    Reason for Consultation:  Abd pain     History of Present Illness:  Emily Smith is a a(n) 69 year old female. 3 day hx of epigastric pain  n and v  no change bowels   Better now    Hx of lap colon resection and lap partial nephrectomy  both for ca   no chemo       History:  Past Medical History:    Anesthesia complication    Cancer of sigmoid colon (HCC)    High blood pressure    High cholesterol    PONV (postoperative nausea and vomiting)    Renal cell carcinoma (HCC)    Visual impairment    glasses     Past Surgical History:   Procedure Laterality Date    Breast surgery      Benign breast cyst    Colonoscopy N/A 08/10/2021    Procedure: COLONOSCOPY;  Surgeon: ROSALINA Mccray MD;  Location: [facility] ENDOSCOPY    Colonoscopy N/A 11/15/2021    Procedure: COLONOSCOPY;  Surgeon: Juan Mahan MD;  Location: [facility] ENDOSCOPY    Colonoscopy N/A 10/20/2022    Procedure: COLONOSCOPY;  Surgeon: Orlando Dumas MD;  Location: [facility] ENDO    Kidney surgery  2021    Tumor removed (Right side)    Laparoscopy, surgical; partial nephrectomy Right 2021    right robotic partial nephrectomy with intraoperative ultrasound, Dr. Villegas @ [facility]    Lumpectomy left      Monet localization wire 1 site left (cpt=19281)          Tubal ligation       Family History   Problem Relation Age of Onset    Colon Cancer Self     Renal Disease Self     Diabetes Mother     Hypertension Mother     Hypertension Father     Diabetes Brother     Diabetes Half-Sister     Diabetes Half-Sister     Cancer Maternal Cousin Female 50        breast cancer    Cancer Maternal Cousin Female         unknown cancer     Bleeding Disorders Neg     Sickle Cell Neg     DVT/VTE Neg       reports that she has never smoked. She has never used smokeless tobacco. She reports that she does not drink alcohol and does not use drugs.    Allergies:  Allergies[1]    Medications:    Current Facility-Administered Medications:     potassium chloride 40 mEq in 250mL sodium chloride 0.9% IVPB premix, 40 mEq, Intravenous, Once    dextrose 5%-sodium chloride 0.45% infusion, , Intravenous, Continuous    [START ON 3/6/2025] cefTRIAXone (Rocephin) 2 g in sodium chloride 0.9% 100 mL IVPB-ADDV, 2 g, Intravenous, Q24H    metroNIDAZOLE in sodium chloride 0.79% (Flagyl) 5 mg/mL IVPB premix 500 mg, 500 mg, Intravenous, Q12H    sodium chloride 0.9% infusion, , Intravenous, Continuous    temazepam (Restoril) cap 15 mg, 15 mg, Oral, Nightly PRN    ondansetron (Zofran) 4 MG/2ML injection 4 mg, 4 mg, Intravenous, Q6H PRN    metoclopramide (Reglan) 5 mg/mL injection 5 mg, 5 mg, Intravenous, Q8H PRN    morphINE PF 2 MG/ML injection 1 mg, 1 mg, Intravenous, Q2H PRN **OR** morphINE PF 2 MG/ML injection 2 mg, 2 mg, Intravenous, Q2H PRN **OR** morphINE PF 4 MG/ML injection 4 mg, 4 mg, Intravenous, Q2H PRN  Prescriptions Prior to Admission[2]    Review of Systems:  A ten point review of systems was negative except as noted.    Physical Exam:  Blood pressure (!) 169/60, pulse 73, temperature 98.2 °F (36.8 °C), temperature source Oral, resp. rate 18, weight 171 lb 1.2 oz (77.6 kg), SpO2 99%, not currently breastfeeding.    General: Alert, orientated x3.  Cooperative.  No apparent distress.  HEENT: Exam is unremarkable.    Lungs: per attending  Cardiac: per attending  Abdomen:  Soft, non-distended, non-tender, with no rebound or guarding.  No peritoneal signs.   Skin: Normal texture and turgor.  Neurologic: per attending  Laboratory Data:  Lab Results   Component Value Date    WBC 11.5 (H) 03/05/2025    HGB 13.8 03/05/2025    HCT 38.5 03/05/2025    .0 03/05/2025     CREATSERUM 1.05 (H) 03/05/2025    BUN 14 03/05/2025     03/05/2025    K 3.1 (L) 03/05/2025     03/05/2025    CO2 25.0 03/05/2025     (H) 03/05/2025    CA 9.6 03/05/2025    ALB 4.9 (H) 03/05/2025    ALKPHO 56 03/05/2025    BILT 0.6 03/05/2025    TP 7.9 03/05/2025    AST 22 03/05/2025    ALT 11 03/05/2025    PTT 32.4 10/09/2021    INR 0.94 10/09/2021    LIP 27 03/05/2025    MG 2.2 03/24/2022    PHOS 2.8 03/24/2022       Imaging:  US GALLBLADDER (CPT=76705)    Result Date: 3/5/2025  PROCEDURE: US GALLBLADDER (CPT=76705)  COMPARISON: [facility], CT ABDOMEN + PELVIS (ALL W+WO) (CPT=74178), 6/08/2023, 3:12 PM.  INDICATIONS: Vomiting, abdominal pain  TECHNIQUE:   The gallbladder was evaluated with grayscale ultrasound.   FINDINGS:  GALLBLADDER:   The gallbladder is distended.  There are multiple gallstones measuring up to 2.3 cm.  The gallbladder wall is mildly thickened and measures 4. The Frank's sign was reported positive by the sonographer.  No pericholecystic fluid. BILE DUCTS:   Normal.  Common bile duct measures 4 mm.  OTHER:   Postoperative changes from prior partial nephrectomy involving the right upper pole.  The right kidney measures 7.1 cm in length.  No right hydronephrosis.         CONCLUSION:   Acute cholecystitis.  No biliary ductal dilatation.  Postoperative changes from prior right upper pole partial nephrectomy.  No right hydronephrosis.    Dictated by (CST): Jose Gandara MD on 3/05/2025 at 12:15 PM     Finalized by (CST): Jose Gandara MD on 3/05/2025 at 12:18 PM            Impression and Plan:  Patient Active Problem List   Diagnosis    Essential hypertension    Hyperlipidemia    Prediabetes    History of renal cell carcinoma    Adenomatous colon polyp    Personal history of colon cancer    Calculus of gallbladder with acute cholecystitis without obstruction    Hypokalemia    Acute cholecystitis   Labs basically neg  Cholelithiasis   Cholecystitis?  Hepato-  biliary scan am    May need cholecystectomy  lap?  Adhesions may be issue  SHELBIE LONDON MD  3/5/2025  3:44 PM       [1]   Allergies  Allergen Reactions    Lisinopril SWELLING    Shrimp SWELLING     Throat swelling    Radiology Contrast Iodinated Dyes RASH   [2]   Medications Prior to Admission   Medication Sig Dispense Refill Last Dose/Taking    amLODIPine 10 MG Oral Tab Take 1 tablet (10 mg total) by mouth nightly. 90 tablet 2 3/2/2025    lidocaine 5 % External Patch Place 1 patch onto the skin daily. (Patient taking differently: Place 1 patch onto the skin Q24H PRN (pain on left thigh).) 7 patch 0 Past Week    pravastatin 40 MG Oral Tab Take 1 tablet (40 mg total) by mouth nightly. 90 tablet 0 3/2/2025    metoprolol succinate ER 50 MG Oral Tablet 24 Hr Take 1.5 tablets (75 mg total) by mouth every evening. 135 tablet 0 3/2/2025    hydroCHLOROthiazide 25 MG Oral Tab Take 1 tablet (25 mg total) by mouth daily. 90 tablet 3 3/3/2025

## 2025-03-05 NOTE — ED INITIAL ASSESSMENT (HPI)
C/o vomiting x3 days, denies diarrhea. Reports upper abd with any PO intake, does not immediately vomit after intake. Denies any urinary issues. Denies fevers.

## 2025-03-06 ENCOUNTER — APPOINTMENT (OUTPATIENT)
Dept: NUCLEAR MEDICINE | Facility: HOSPITAL | Age: 70
End: 2025-03-06
Attending: SPECIALIST
Payer: MEDICARE

## 2025-03-06 LAB
ANION GAP SERPL CALC-SCNC: 9 MMOL/L (ref 0–18)
BASOPHILS # BLD AUTO: 0.01 X10(3) UL (ref 0–0.2)
BASOPHILS NFR BLD AUTO: 0.1 %
BUN BLD-MCNC: 8 MG/DL (ref 9–23)
BUN/CREAT SERPL: 9.3 (ref 10–20)
CALCIUM BLD-MCNC: 8.3 MG/DL (ref 8.7–10.4)
CHLORIDE SERPL-SCNC: 107 MMOL/L (ref 98–112)
CO2 SERPL-SCNC: 25 MMOL/L (ref 21–32)
CREAT BLD-MCNC: 0.86 MG/DL
DEPRECATED RDW RBC AUTO: 41.1 FL (ref 35.1–46.3)
EGFRCR SERPLBLD CKD-EPI 2021: 74 ML/MIN/1.73M2 (ref 60–?)
EOSINOPHIL # BLD AUTO: 0.03 X10(3) UL (ref 0–0.7)
EOSINOPHIL NFR BLD AUTO: 0.3 %
ERYTHROCYTE [DISTWIDTH] IN BLOOD BY AUTOMATED COUNT: 12.2 % (ref 11–15)
GLUCOSE BLD-MCNC: 111 MG/DL (ref 70–99)
HCT VFR BLD AUTO: 37.2 %
HGB BLD-MCNC: 12.7 G/DL
IMM GRANULOCYTES # BLD AUTO: 0.03 X10(3) UL (ref 0–1)
IMM GRANULOCYTES NFR BLD: 0.3 %
LYMPHOCYTES # BLD AUTO: 2.11 X10(3) UL (ref 1–4)
LYMPHOCYTES NFR BLD AUTO: 23.3 %
MCH RBC QN AUTO: 31.2 PG (ref 26–34)
MCHC RBC AUTO-ENTMCNC: 34.1 G/DL (ref 31–37)
MCV RBC AUTO: 91.4 FL
MONOCYTES # BLD AUTO: 0.75 X10(3) UL (ref 0.1–1)
MONOCYTES NFR BLD AUTO: 8.3 %
NEUTROPHILS # BLD AUTO: 6.12 X10 (3) UL (ref 1.5–7.7)
NEUTROPHILS # BLD AUTO: 6.12 X10(3) UL (ref 1.5–7.7)
NEUTROPHILS NFR BLD AUTO: 67.7 %
OSMOLALITY SERPL CALC.SUM OF ELEC: 291 MOSM/KG (ref 275–295)
PLATELET # BLD AUTO: 257 10(3)UL (ref 150–450)
POTASSIUM SERPL-SCNC: 3.1 MMOL/L (ref 3.5–5.1)
RBC # BLD AUTO: 4.07 X10(6)UL
SODIUM SERPL-SCNC: 141 MMOL/L (ref 136–145)
WBC # BLD AUTO: 9.1 X10(3) UL (ref 4–11)

## 2025-03-06 PROCEDURE — 99233 SBSQ HOSP IP/OBS HIGH 50: CPT | Performed by: HOSPITALIST

## 2025-03-06 PROCEDURE — 78226 HEPATOBILIARY SYSTEM IMAGING: CPT | Performed by: SPECIALIST

## 2025-03-06 NOTE — PROGRESS NOTES
[facility]  Progress Note     Emily Smith  : 3/30/1955    Status: Observation  Day #: 0    Attending: Dayton Mandujano MD  PCP: Rachel Mckinley DO     Assessment and Plan:    Possible acute cholecystitis  -general surgery on consult  -CT A/P reviewed  -GB US suggestive of acute cholecystitis  -HIDA scan pending    Other  -HTN  -HL        DVT Mechanical Prophylaxis:   SCDs,    DVT Pharmacologic Prophylaxis   Medication   None               Subjective:      Initial Chief Complaint:  abdominal pain    No abd pain currently. No n/v.     10 point ROS completed and was negative, except for pertinent positive and negatives stated in subjective.      Objective:      Temp:  [97.7 °F (36.5 °C)-98.2 °F (36.8 °C)] 98.2 °F (36.8 °C)  Pulse:  [51-97] 51  Resp:  [11-18] 18  BP: (142-177)/(51-70) 148/54  SpO2:  [96 %-100 %] 97 %  General:  Alert, no distress  HEENT:  Normocephalic, atraumatic  Cardiac:  Regular rate, regular rhythm  Pulmonary:  Clear to auscultation bilaterally, respirations unlabored  Gastrointestinal:  Soft, non-tender, normal bowel sounds  Musculoskeletal:  No joint swelling  Extremities:  No edema, no cyanosis, no clubbing  Neurologic:  nonfocal  Psychiatric:  Normal affect, calm and appropriate    Intake/Output Summary (Last 24 hours) at 3/6/2025 1053  Last data filed at 3/6/2025 0912  Gross per 24 hour   Intake 1100 ml   Output 1 ml   Net 1099 ml         Recent Labs   Lab 25  1057 25  0616   WBC 11.5* 9.1   HGB 13.8 12.7   HCT 38.5 37.2   .0 257.0   RBC 4.40 4.07   MCV 87.5 91.4   MCH 31.4 31.2   MCHC 35.8 34.1   RDW 12.1 12.2   NEPRELIM 10.20* 6.12     Recent Labs   Lab 25  1057 25  0616   BUN 14 8*   CREATSERUM 1.05* 0.86   CA 9.6 8.3*   ALB 4.9*  --     141   K 3.1* 3.1*    107   CO2 25.0 25.0   * 111*   BILT 0.6  --    AST 22  --    ALT 11  --    ALKPHO 56  --    TP 7.9  --    LIP 27  --        US GALLBLADDER (CPT=76705)    Result Date:  3/5/2025  CONCLUSION:   Acute cholecystitis.  No biliary ductal dilatation.  Postoperative changes from prior right upper pole partial nephrectomy.  No right hydronephrosis.    Dictated by (CST): Jose Gandara MD on 3/05/2025 at 12:15 PM     Finalized by (CST): Jose Gandara MD on 3/05/2025 at 12:18 PM             Medications:   potassium chloride  40 mEq Intravenous Once    cefTRIAXone  2 g Intravenous Q24H    metroNIDAZOLE  500 mg Intravenous Q12H    amLODIPine  10 mg Oral Nightly    metoprolol succinate ER  75 mg Oral QPM    atorvastatin  10 mg Oral Nightly      PRN Meds:   temazepam    ondansetron    metoclopramide    morphINE **OR** morphINE **OR** morphINE    Supplementary Documentation:   DVT Mechanical Prophylaxis:   SCDs,    DVT Pharmacologic Prophylaxis   Medication   None                Code Status: Full Code  Hanson: No urinary catheter in place  Hanson Duration (in days):   Central line:    RICKI: 3/7/2025        **Certification      PHYSICIAN Certification of Need for Inpatient Hospitalization - Initial Certification    Patient will require inpatient services that will reasonably be expected to span two midnight's based on the clinical documentation in H+P.   Based on patients current state of illness, I anticipate that, after discharge, patient will require TBD.            MDM High. Time spent on chart/note review, review of labs/imaging, discussion with patient, physical exam, discussion with staff, consultants, coordinating care, writing progress note, discussion of plan of care.      Dayton Mandujano MD

## 2025-03-06 NOTE — PLAN OF CARE
NPO Midnight. Plan for lap cholecystectomy. Consent signed and placed in chart. Patient aware and agreeable to plan of care.   On clears at this time.   Denies pain. Denies nausea or vomiting.   +Shower this afternoon.   Electrolytes replaced per protocol. On ceftriaxone and flagyl.   Call light within reach. Ambulating self.

## 2025-03-06 NOTE — PLAN OF CARE
Emily is aox4, NPO, voiding freely, ambulating ad giselle. Denies pain. Zofran given for nausea. BP was high MD notified, med rec was done. Call light within reach, calls appropriately. Safety precautions in place.     Problem: Patient Centered Care  Goal: Patient preferences are identified and integrated in the patient's plan of care  Description: Interventions:  - What would you like us to know as we care for you?   - Provide timely, complete, and accurate information to patient/family  - Incorporate patient and family knowledge, values, beliefs, and cultural backgrounds into the planning and delivery of care  - Encourage patient/family to participate in care and decision-making at the level they choose  - Honor patient and family perspectives and choices  Outcome: Progressing     Problem: Patient/Family Goals  Goal: Patient/Family Long Term Goal  Description: Patient's Long Term Goal:     Interventions:  -   - See additional Care Plan goals for specific interventions  Outcome: Progressing  Goal: Patient/Family Short Term Goal  Description: Patient's Short Term Goal:     Interventions:   -   - See additional Care Plan goals for specific interventions  Outcome: Progressing     Problem: PAIN - ADULT  Goal: Verbalizes/displays adequate comfort level or patient's stated pain goal  Description: INTERVENTIONS:  - Encourage pt to monitor pain and request assistance  - Assess pain using appropriate pain scale  - Administer analgesics based on type and severity of pain and evaluate response  - Implement non-pharmacological measures as appropriate and evaluate response  - Consider cultural and social influences on pain and pain management  - Manage/alleviate anxiety  - Utilize distraction and/or relaxation techniques  - Monitor for opioid side effects  - Notify MD/LIP if interventions unsuccessful or patient reports new pain  - Anticipate increased pain with activity and pre-medicate as appropriate  Outcome: Progressing      Problem: SAFETY ADULT - FALL  Goal: Free from fall injury  Description: INTERVENTIONS:  - Assess pt frequently for physical needs  - Identify cognitive and physical deficits and behaviors that affect risk of falls.  - [place] fall precautions as indicated by assessment.  - Educate pt/family on patient safety including physical limitations  - Instruct pt to call for assistance with activity based on assessment  - Modify environment to reduce risk of injury  - Provide assistive devices as appropriate  - Consider OT/PT consult to assist with strengthening/mobility  - Encourage toileting schedule  Outcome: Progressing

## 2025-03-06 NOTE — PROGRESS NOTES
[facility]  part of [facility]    Progress Note    Emily Smith Patient Status:  Observation    3/30/1955 MRN [id number]   Location [facility] 4W/SW/SE Attending Dayton Mandujano MD   Hosp Day # 0 PCP Rachel Mckinley DO     Subjective:  Feels ok    Objective/Physical Exam:  General: Alert, orientated x3.  Cooperative.  No apparent distress.  Vital Signs:  Blood pressure 153/59, pulse 58, temperature 98.2 °F (36.8 °C), temperature source Oral, resp. rate 18, weight 171 lb 1.2 oz (77.6 kg), SpO2 98%, not currently breastfeeding.    Abdomen:  Soft, non-distended, non-tender,   Labs:  Lab Results   Component Value Date    WBC 9.1 2025    HGB 12.7 2025    HCT 37.2 2025    .0 2025    CREATSERUM 0.86 2025    BUN 8 (L) 2025     2025    K 3.1 (L) 2025     2025    CO2 25.0 2025     (H) 2025    CA 8.3 (L) 2025    ALB 4.9 (H) 2025    ALKPHO 56 2025    BILT 0.6 2025    TP 7.9 2025    AST 22 2025    ALT 11 2025    PTT 32.4 10/09/2021    INR 0.94 10/09/2021    LIP 27 2025    MG 2.2 2022    PHOS 2.8 2022       Imaging:  NM GB HEPATOBILIARY SCAN  (CPT=78226)    Result Date: 3/6/2025  PROCEDURE: NM GB HEPATOBILIARY SCAN (CPT=78226)  COMPARISON: [facility],  GALLBLADDER (CPT=76705), 3/05/2025, 11:27 AM.  INDICATIONS: Right upper quadrant abdominal pain. Personal history of cholelithiasis and sonographic findings concerning for acute cholecystitis.  TECHNIQUE: Hepatobiliary imaging was performed after the injection of 8.9 millicuries of Technetium-99m Choletec into the left antecubital vein.   FINDINGS:  LIVER: There is prompt, homogeneous uptake of radiotracer by the liver on the immediate and 5 minute images.  BILIARY DUCTS: The central biliary structures are seen prominently 15 minutes after tracer injection.  GALLBLADDER: Normal.   Monitor: The problem is improving with treatment.  Evaluation: Labs/tests ordered, see encounter summary.  Assessment/Treatment:  Continue current treatment/monitoring regimen. and Managed by specialist.  She has follow-up with cardiology scheduled.  She remains on Bumex 2 mg twice daily.  She is continuing to monitor her weight, weight continues to decrease at home.  She has home care coming in as well.  She is limiting her salt intake, overall feeling that her edema is improving, especially to her lower legs.   The gallbladder was visualized by the 60 minute image.  INTESTINE: Normal, with evidence of tracer by the 30 minute image.  This is confirmed on subsequent images.           CONCLUSION:  No scintigraphic evidence of acute cholecystitis or biliary obstruction.    Dictated by (CST): Orlando Mccray MD on 3/06/2025 at 12:58 PM     Finalized by (CST): Orlando Mccray MD on 3/06/2025 at 1:00 PM          US GALLBLADDER (CPT=76705)    Result Date: 3/5/2025  PROCEDURE: US GALLBLADDER (CPT=76705)  COMPARISON: [facility], CT ABDOMEN + PELVIS (ALL W+WO) (CPT=74178), 6/08/2023, 3:12 PM.  INDICATIONS: Vomiting, abdominal pain  TECHNIQUE:   The gallbladder was evaluated with grayscale ultrasound.   FINDINGS:  GALLBLADDER:   The gallbladder is distended.  There are multiple gallstones measuring up to 2.3 cm.  The gallbladder wall is mildly thickened and measures 4. The Frank's sign was reported positive by the sonographer.  No pericholecystic fluid. BILE DUCTS:   Normal.  Common bile duct measures 4 mm.  OTHER:   Postoperative changes from prior partial nephrectomy involving the right upper pole.  The right kidney measures 7.1 cm in length.  No right hydronephrosis.         CONCLUSION:   Acute cholecystitis.  No biliary ductal dilatation.  Postoperative changes from prior right upper pole partial nephrectomy.  No right hydronephrosis.    Dictated by (CST): Jose Gandara MD on 3/05/2025 at 12:15 PM     Finalized by (CST): Jose Gandara MD on 3/05/2025 at 12:18 PM            Assessment/Plan:  Patient Active Problem List   Diagnosis    Essential hypertension    Hyperlipidemia    Prediabetes    History of renal cell carcinoma    Adenomatous colon polyp    Personal history of colon cancer    Calculus of gallbladder with acute cholecystitis without obstruction    Hypokalemia    Acute cholecystitis   Hepato-biliary scan neg for cholecystitis   Long discussion with pt and family  Dx  cholelithiasis   biliary  colic  Agree for cholecystectomy   will try laparoscopic but possibility of open due to previous surgery    SHELBIE LONDON MD  3/6/2025  3:04 PM

## 2025-03-07 ENCOUNTER — ANESTHESIA EVENT (OUTPATIENT)
Dept: SURGERY | Facility: HOSPITAL | Age: 70
End: 2025-03-07
Payer: MEDICARE

## 2025-03-07 ENCOUNTER — ANESTHESIA (OUTPATIENT)
Dept: SURGERY | Facility: HOSPITAL | Age: 70
End: 2025-03-07
Payer: MEDICARE

## 2025-03-07 LAB
ANION GAP SERPL CALC-SCNC: 8 MMOL/L (ref 0–18)
BASOPHILS # BLD AUTO: 0.01 X10(3) UL (ref 0–0.2)
BASOPHILS NFR BLD AUTO: 0.1 %
BUN BLD-MCNC: 7 MG/DL (ref 9–23)
BUN/CREAT SERPL: 8.1 (ref 10–20)
CALCIUM BLD-MCNC: 8.4 MG/DL (ref 8.7–10.4)
CHLORIDE SERPL-SCNC: 108 MMOL/L (ref 98–112)
CO2 SERPL-SCNC: 26 MMOL/L (ref 21–32)
CREAT BLD-MCNC: 0.86 MG/DL
DEPRECATED RDW RBC AUTO: 40.1 FL (ref 35.1–46.3)
EGFRCR SERPLBLD CKD-EPI 2021: 74 ML/MIN/1.73M2 (ref 60–?)
EOSINOPHIL # BLD AUTO: 0.09 X10(3) UL (ref 0–0.7)
EOSINOPHIL NFR BLD AUTO: 1.2 %
ERYTHROCYTE [DISTWIDTH] IN BLOOD BY AUTOMATED COUNT: 12.2 % (ref 11–15)
GLUCOSE BLD-MCNC: 83 MG/DL (ref 70–99)
HCT VFR BLD AUTO: 34.2 %
HGB BLD-MCNC: 11.9 G/DL
IMM GRANULOCYTES # BLD AUTO: 0.03 X10(3) UL (ref 0–1)
IMM GRANULOCYTES NFR BLD: 0.4 %
LYMPHOCYTES # BLD AUTO: 1.99 X10(3) UL (ref 1–4)
LYMPHOCYTES NFR BLD AUTO: 26.6 %
MCH RBC QN AUTO: 31.5 PG (ref 26–34)
MCHC RBC AUTO-ENTMCNC: 34.8 G/DL (ref 31–37)
MCV RBC AUTO: 90.5 FL
MONOCYTES # BLD AUTO: 0.7 X10(3) UL (ref 0.1–1)
MONOCYTES NFR BLD AUTO: 9.4 %
NEUTROPHILS # BLD AUTO: 4.66 X10 (3) UL (ref 1.5–7.7)
NEUTROPHILS # BLD AUTO: 4.66 X10(3) UL (ref 1.5–7.7)
NEUTROPHILS NFR BLD AUTO: 62.3 %
OSMOLALITY SERPL CALC.SUM OF ELEC: 291 MOSM/KG (ref 275–295)
PLATELET # BLD AUTO: 245 10(3)UL (ref 150–450)
POTASSIUM SERPL-SCNC: 3.3 MMOL/L (ref 3.5–5.1)
POTASSIUM SERPL-SCNC: 3.3 MMOL/L (ref 3.5–5.1)
RBC # BLD AUTO: 3.78 X10(6)UL
SODIUM SERPL-SCNC: 142 MMOL/L (ref 136–145)
WBC # BLD AUTO: 7.5 X10(3) UL (ref 4–11)

## 2025-03-07 PROCEDURE — 99233 SBSQ HOSP IP/OBS HIGH 50: CPT | Performed by: HOSPITALIST

## 2025-03-07 PROCEDURE — 0FT44ZZ RESECTION OF GALLBLADDER, PERCUTANEOUS ENDOSCOPIC APPROACH: ICD-10-PCS | Performed by: SPECIALIST

## 2025-03-07 RX ORDER — ROCURONIUM BROMIDE 10 MG/ML
INJECTION, SOLUTION INTRAVENOUS AS NEEDED
Status: DISCONTINUED | OUTPATIENT
Start: 2025-03-07 | End: 2025-03-07 | Stop reason: SURG

## 2025-03-07 RX ORDER — SCOPOLAMINE 1 MG/3D
PATCH, EXTENDED RELEASE TRANSDERMAL AS NEEDED
Status: DISCONTINUED | OUTPATIENT
Start: 2025-03-07 | End: 2025-03-07 | Stop reason: SURG

## 2025-03-07 RX ORDER — ONDANSETRON 2 MG/ML
INJECTION INTRAMUSCULAR; INTRAVENOUS AS NEEDED
Status: DISCONTINUED | OUTPATIENT
Start: 2025-03-07 | End: 2025-03-07 | Stop reason: SURG

## 2025-03-07 RX ORDER — OXYCODONE HYDROCHLORIDE 5 MG/1
5 TABLET ORAL EVERY 4 HOURS PRN
Status: DISCONTINUED | OUTPATIENT
Start: 2025-03-07 | End: 2025-03-08

## 2025-03-07 RX ORDER — DEXTROSE MONOHYDRATE, SODIUM CHLORIDE, AND POTASSIUM CHLORIDE 50; 1.49; 4.5 G/1000ML; G/1000ML; G/1000ML
INJECTION, SOLUTION INTRAVENOUS CONTINUOUS
Status: DISCONTINUED | OUTPATIENT
Start: 2025-03-07 | End: 2025-03-08

## 2025-03-07 RX ORDER — NALOXONE HYDROCHLORIDE 0.4 MG/ML
0.08 INJECTION, SOLUTION INTRAMUSCULAR; INTRAVENOUS; SUBCUTANEOUS AS NEEDED
Status: DISCONTINUED | OUTPATIENT
Start: 2025-03-07 | End: 2025-03-07 | Stop reason: HOSPADM

## 2025-03-07 RX ORDER — HYDROMORPHONE HYDROCHLORIDE 1 MG/ML
0.4 INJECTION, SOLUTION INTRAMUSCULAR; INTRAVENOUS; SUBCUTANEOUS EVERY 5 MIN PRN
Status: DISCONTINUED | OUTPATIENT
Start: 2025-03-07 | End: 2025-03-07 | Stop reason: HOSPADM

## 2025-03-07 RX ORDER — KETOROLAC TROMETHAMINE 30 MG/ML
INJECTION, SOLUTION INTRAMUSCULAR; INTRAVENOUS AS NEEDED
Status: DISCONTINUED | OUTPATIENT
Start: 2025-03-07 | End: 2025-03-07 | Stop reason: SURG

## 2025-03-07 RX ORDER — MORPHINE SULFATE 4 MG/ML
4 INJECTION, SOLUTION INTRAMUSCULAR; INTRAVENOUS EVERY 10 MIN PRN
Status: DISCONTINUED | OUTPATIENT
Start: 2025-03-07 | End: 2025-03-07

## 2025-03-07 RX ORDER — LIDOCAINE HYDROCHLORIDE 10 MG/ML
INJECTION, SOLUTION EPIDURAL; INFILTRATION; INTRACAUDAL; PERINEURAL AS NEEDED
Status: DISCONTINUED | OUTPATIENT
Start: 2025-03-07 | End: 2025-03-07 | Stop reason: SURG

## 2025-03-07 RX ORDER — DEXAMETHASONE SODIUM PHOSPHATE 4 MG/ML
VIAL (ML) INJECTION AS NEEDED
Status: DISCONTINUED | OUTPATIENT
Start: 2025-03-07 | End: 2025-03-07 | Stop reason: SURG

## 2025-03-07 RX ORDER — HYDROMORPHONE HYDROCHLORIDE 1 MG/ML
0.2 INJECTION, SOLUTION INTRAMUSCULAR; INTRAVENOUS; SUBCUTANEOUS EVERY 2 HOUR PRN
Status: DISCONTINUED | OUTPATIENT
Start: 2025-03-07 | End: 2025-03-08

## 2025-03-07 RX ORDER — MORPHINE SULFATE 4 MG/ML
2 INJECTION, SOLUTION INTRAMUSCULAR; INTRAVENOUS EVERY 10 MIN PRN
Status: DISCONTINUED | OUTPATIENT
Start: 2025-03-07 | End: 2025-03-07

## 2025-03-07 RX ORDER — HYDROMORPHONE HYDROCHLORIDE 1 MG/ML
0.6 INJECTION, SOLUTION INTRAMUSCULAR; INTRAVENOUS; SUBCUTANEOUS EVERY 5 MIN PRN
Status: DISCONTINUED | OUTPATIENT
Start: 2025-03-07 | End: 2025-03-07 | Stop reason: HOSPADM

## 2025-03-07 RX ORDER — MORPHINE SULFATE 10 MG/ML
6 INJECTION, SOLUTION INTRAMUSCULAR; INTRAVENOUS EVERY 10 MIN PRN
Status: DISCONTINUED | OUTPATIENT
Start: 2025-03-07 | End: 2025-03-07 | Stop reason: HOSPADM

## 2025-03-07 RX ORDER — BUPIVACAINE HYDROCHLORIDE 5 MG/ML
INJECTION, SOLUTION EPIDURAL; INTRACAUDAL AS NEEDED
Status: DISCONTINUED | OUTPATIENT
Start: 2025-03-07 | End: 2025-03-07 | Stop reason: HOSPADM

## 2025-03-07 RX ORDER — HYDROMORPHONE HYDROCHLORIDE 1 MG/ML
0.4 INJECTION, SOLUTION INTRAMUSCULAR; INTRAVENOUS; SUBCUTANEOUS EVERY 2 HOUR PRN
Status: DISCONTINUED | OUTPATIENT
Start: 2025-03-07 | End: 2025-03-08

## 2025-03-07 RX ORDER — HYDROMORPHONE HYDROCHLORIDE 1 MG/ML
0.2 INJECTION, SOLUTION INTRAMUSCULAR; INTRAVENOUS; SUBCUTANEOUS EVERY 5 MIN PRN
Status: DISCONTINUED | OUTPATIENT
Start: 2025-03-07 | End: 2025-03-07 | Stop reason: HOSPADM

## 2025-03-07 RX ORDER — SODIUM CHLORIDE, SODIUM LACTATE, POTASSIUM CHLORIDE, CALCIUM CHLORIDE 600; 310; 30; 20 MG/100ML; MG/100ML; MG/100ML; MG/100ML
INJECTION, SOLUTION INTRAVENOUS CONTINUOUS
Status: DISCONTINUED | OUTPATIENT
Start: 2025-03-07 | End: 2025-03-07

## 2025-03-07 RX ORDER — ENOXAPARIN SODIUM 100 MG/ML
40 INJECTION SUBCUTANEOUS DAILY
Status: DISCONTINUED | OUTPATIENT
Start: 2025-03-08 | End: 2025-03-08

## 2025-03-07 RX ORDER — OXYCODONE HYDROCHLORIDE 5 MG/1
2.5 TABLET ORAL EVERY 4 HOURS PRN
Status: DISCONTINUED | OUTPATIENT
Start: 2025-03-07 | End: 2025-03-08

## 2025-03-07 RX ADMIN — ROCURONIUM BROMIDE 50 MG: 10 INJECTION, SOLUTION INTRAVENOUS at 11:13:00

## 2025-03-07 RX ADMIN — DEXAMETHASONE SODIUM PHOSPHATE 8 MG: 4 MG/ML VIAL (ML) INJECTION at 11:12:00

## 2025-03-07 RX ADMIN — LIDOCAINE HYDROCHLORIDE 50 MG: 10 INJECTION, SOLUTION EPIDURAL; INFILTRATION; INTRACAUDAL; PERINEURAL at 11:12:00

## 2025-03-07 RX ADMIN — KETOROLAC TROMETHAMINE 30 MG: 30 INJECTION, SOLUTION INTRAMUSCULAR; INTRAVENOUS at 12:19:00

## 2025-03-07 RX ADMIN — SODIUM CHLORIDE: 9 INJECTION, SOLUTION INTRAVENOUS at 12:24:00

## 2025-03-07 RX ADMIN — ONDANSETRON 4 MG: 2 INJECTION INTRAMUSCULAR; INTRAVENOUS at 11:12:00

## 2025-03-07 RX ADMIN — SCOPOLAMINE 1 PATCH: 1 PATCH, EXTENDED RELEASE TRANSDERMAL at 10:54:00

## 2025-03-07 NOTE — BRIEF OP NOTE
Patients Name: Emily Smith  Attending Physician: Dayton Mandujano MD  Operating Physician: SHELBIE LONDON MD  CSN: [id number]     Location:  OR  MRN: [id number]    YOB: 1955  Admission Date: 3/5/2025  Operation Date: 3/7/2025    Brief Operative Report    Pre-Operative Diagnosis: Cholelithiasis    Post-Operative Diagnosis: Same as above.    Procedure Performed: LAPAROSCOPIC CHOLECYSTECTOMY     Surgeon: SHELBIE LONDON MD    Assistants: Galen Daniels SA    Anesthesia: General    Attending:  Ha    EBL: 3cc    Findings: chronic cholecystitis  cholelithiasis    Specimens:    ID Type Source Tests Collected by Time Destination   1 : 1. gallbladder and contents Tissue Gallbladder SURGICAL PATHOLOGY TISSUE Shelbie London MD 3/7/2025 1132        Drains: none    Complications: None    Disposition: stable     SHELBIE LONDON MD  3/7/2025  12:02 PM

## 2025-03-07 NOTE — ANESTHESIA PROCEDURE NOTES
Airway  Date/Time: 3/7/2025 11:15 AM  Urgency: Elective    Airway not difficult    General Information and Staff    Patient location during procedure: OR  Resident/CRNA: Bertha Macdonald CRNA  Performed: CRNA   Performed by: Bertha Macdonald CRNA  Authorized by: Clinton Sanchez MD      Indications and Patient Condition  Indications for airway management: anesthesia  Sedation level: deep  Preoxygenated: yes  Patient position: sniffing  Mask difficulty assessment: 1 - vent by mask    Final Airway Details  Final airway type: endotracheal airway      Successful airway: ETT  Cuffed: yes   Successful intubation technique: direct laryngoscopy  Endotracheal tube insertion site: oral    Placement verified by: capnometry   Measured from: teeth  Number of attempts at approach: 1

## 2025-03-07 NOTE — PLAN OF CARE
Problem: Patient Centered Care  Goal: Patient preferences are identified and integrated in the patient's plan of care  Description: Interventions:  - What would you like us to know as we care for you?   - Provide timely, complete, and accurate information to patient/family  - Incorporate patient and family knowledge, values, beliefs, and cultural backgrounds into the planning and delivery of care  - Encourage patient/family to participate in care and decision-making at the level they choose  - Honor patient and family perspectives and choices  Outcome: Progressing     Problem: PAIN - ADULT  Goal: Verbalizes/displays adequate comfort level or patient's stated pain goal  Description: INTERVENTIONS:  - Encourage pt to monitor pain and request assistance  - Assess pain using appropriate pain scale  - Administer analgesics based on type and severity of pain and evaluate response  - Implement non-pharmacological measures as appropriate and evaluate response  - Consider cultural and social influences on pain and pain management  - Manage/alleviate anxiety  - Utilize distraction and/or relaxation techniques  - Monitor for opioid side effects  - Notify MD/LIP if interventions unsuccessful or patient reports new pain  - Anticipate increased pain with activity and pre-medicate as appropriate  Outcome: Progressing     Problem: SAFETY ADULT - FALL  Goal: Free from fall injury  Description: INTERVENTIONS:  - Assess pt frequently for physical needs  - Identify cognitive and physical deficits and behaviors that affect risk of falls.  - [place] fall precautions as indicated by assessment.  - Educate pt/family on patient safety including physical limitations  - Instruct pt to call for assistance with activity based on assessment  - Modify environment to reduce risk of injury  - Provide assistive devices as appropriate  - Consider OT/PT consult to assist with strengthening/mobility  - Encourage toileting schedule  Outcome:  Progressing     A/ox4, on RA, up self, ambulates room independently, voiding freely, no BM overnight, NPO at midnight, IVF and IV antibiotics continued, denies pain/nausea, vital signs stable, no acute changes overnight. Call light within reach, safety, measures in place, frequent rounding done, plan of care continued.

## 2025-03-07 NOTE — ANESTHESIA PREPROCEDURE EVALUATION
Anesthesia PreOp Note    HPI:     Emily Smith is a 69 year old female who presents for preoperative consultation requested by: Osmany Slade MD    Date of Surgery: 3/5/2025 - 3/7/2025    Procedure(s):  LAPAROSCOPIC CHOLECYSTECTOMY  Indication: Cholecystitis [K81.9]    Relevant Problems   No relevant active problems       NPO:  Last Liquid Consumption Date: 03/06/25  Last Liquid Consumption Time: 2300  Last Solid Consumption Date: 03/06/25  Last Solid Consumption Time: 2300  Last Liquid Consumption Date: 03/06/25          History Review:  Patient Active Problem List    Diagnosis Date Noted    Calculus of gallbladder with acute cholecystitis without obstruction 03/05/2025    Hypokalemia 03/05/2025    Acute cholecystitis 03/05/2025    Personal history of colon cancer 06/22/2022    Adenomatous colon polyp 03/23/2022    History of renal cell carcinoma 01/19/2022    Prediabetes 04/22/2021    Essential hypertension     Hyperlipidemia        Past Medical History:    Anesthesia complication    Cancer of sigmoid colon (HCC)    High blood pressure    High cholesterol    PONV (postoperative nausea and vomiting)    Renal cell carcinoma (HCC)    Visual impairment    glasses       Past Surgical History:   Procedure Laterality Date    Breast surgery  1998    Benign breast cyst    Colonoscopy N/A 08/10/2021    Procedure: COLONOSCOPY;  Surgeon: ROSALINA Mccray MD;  Location: [facility] ENDOSCOPY    Colonoscopy N/A 11/15/2021    Procedure: COLONOSCOPY;  Surgeon: Juan Mahan MD;  Location: [facility] ENDOSCOPY    Colonoscopy N/A 10/20/2022    Procedure: COLONOSCOPY;  Surgeon: Orlando Dumas MD;  Location: [facility] ENDO    Kidney surgery  11/23/2021    Tumor removed (Right side)    Laparoscopy, surgical; partial nephrectomy Right 11/23/2021    right robotic partial nephrectomy with intraoperative ultrasound, Dr. Villegas @ [facility]    Lumpectomy left      Monet localization wire 1 site left (cpt=19281)      1998    Tubal ligation  1990        Prescriptions Prior to Admission[1]  Current Medications and Prescriptions Ordered in Epic[2]    Allergies[3]    Family History   Problem Relation Age of Onset    Colon Cancer Self     Renal Disease Self     Diabetes Mother     Hypertension Mother     Hypertension Father     Diabetes Brother     Diabetes Half-Sister     Diabetes Half-Sister     Cancer Maternal Cousin Female 50        breast cancer    Cancer Maternal Cousin Female         unknown cancer    Bleeding Disorders Neg     Sickle Cell Neg     DVT/VTE Neg      Social History     Socioeconomic History    Marital status:    Tobacco Use    Smoking status: Never    Smokeless tobacco: Never   Vaping Use    Vaping status: Never Used   Substance and Sexual Activity    Alcohol use: Never    Drug use: Never    Sexual activity: Yes     Partners: Male       Available pre-op labs reviewed.  Lab Results   Component Value Date    WBC 7.5 03/07/2025    RBC 3.78 (L) 03/07/2025    HGB 11.9 (L) 03/07/2025    HCT 34.2 (L) 03/07/2025    MCV 90.5 03/07/2025    MCH 31.5 03/07/2025    MCHC 34.8 03/07/2025    RDW 12.2 03/07/2025    .0 03/07/2025     Lab Results   Component Value Date     03/07/2025    K 3.3 (L) 03/07/2025    K 3.3 (L) 03/07/2025     03/07/2025    CO2 26.0 03/07/2025    BUN 7 (L) 03/07/2025    CREATSERUM 0.86 03/07/2025    GLU 83 03/07/2025    CA 8.4 (L) 03/07/2025          Vital Signs:  Body mass index is 31.29 kg/m².   weight is 77.6 kg (171 lb 1.2 oz). Her oral temperature is 98 °F (36.7 °C). Her blood pressure is 153/54 and her pulse is 54. Her respiration is 18 and oxygen saturation is 98%.   Vitals:    03/06/25 2035 03/07/25 0346 03/07/25 0354 03/07/25 1045   BP: 160/55 142/57  153/54   Pulse:  (!) 48  54   Resp:  18  18   Temp:  98 °F (36.7 °C)  98 °F (36.7 °C)   TempSrc:  Oral  Oral   SpO2:  90% 96% 98%   Weight:            Anesthesia Evaluation     Patient summary reviewed and Nursing notes reviewed    History of anesthetic  complications   Airway   Mallampati: II  TM distance: >3 FB  Neck ROM: full  Dental - Dentition appears grossly intact     Comment: Partial dentures left in pts room    Pulmonary    Cardiovascular - normal exam  (+) hypertension    ROS comment: HLD    Neuro/Psych - negative ROS     GI/Hepatic/Renal - negative ROS     Endo/Other - negative ROS   Abdominal  - normal exam                 Anesthesia Plan:   ASA:  2  Plan:   General  Informed Consent Plan and Risks Discussed With:  Patient  Use of Blood Products Discussed With:  Patient      I have informed Emily Smith and/or legal guardian or family member of the nature of the anesthetic plan, benefits, risks including possible dental damage if relevant, major complications, and any alternative forms of anesthetic management.   All of the patient's questions were answered to the best of my ability. The patient desires the anesthetic management as planned.  Bertha Macdonald CRNA  3/7/2025 10:47 AM  Present on Admission:  **None**           [1]   Medications Prior to Admission   Medication Sig Dispense Refill Last Dose/Taking    amLODIPine 10 MG Oral Tab Take 1 tablet (10 mg total) by mouth nightly. 90 tablet 2 3/2/2025    lidocaine 5 % External Patch Place 1 patch onto the skin daily. (Patient taking differently: Place 1 patch onto the skin Q24H PRN (pain on left thigh).) 7 patch 0 Past Week    pravastatin 40 MG Oral Tab Take 1 tablet (40 mg total) by mouth nightly. 90 tablet 0 3/2/2025    metoprolol succinate ER 50 MG Oral Tablet 24 Hr Take 1.5 tablets (75 mg total) by mouth every evening. 135 tablet 0 3/2/2025    hydroCHLOROthiazide 25 MG Oral Tab Take 1 tablet (25 mg total) by mouth daily. 90 tablet 3 3/3/2025   [2]   Current Facility-Administered Medications Ordered in Epic   Medication Dose Route Frequency Provider Last Rate Last Admin    potassium chloride 40 mEq in 250mL sodium chloride 0.9% IVPB premix  40 mEq Intravenous Once Dayton Mandujano MD 62.5 mL/hr at  03/07/25 0958 40 mEq at 03/07/25 0958    cefTRIAXone (Rocephin) 2 g in sodium chloride 0.9% 100 mL IVPB-ADDV  2 g Intravenous Q24H Lisseth Izaguirre  mL/hr at 03/06/25 1521 2 g at 03/06/25 1521    metroNIDAZOLE in sodium chloride 0.79% (Flagyl) 5 mg/mL IVPB premix 500 mg  500 mg Intravenous Q12H Lisseth Izaguirre  mL/hr at 03/07/25 0958 500 mg at 03/07/25 0958    sodium chloride 0.9% infusion   Intravenous Continuous Lisseth Izaguirre  mL/hr at 03/06/25 0500 New Bag at 03/06/25 0500    [Transfer Hold] temazepam (Restoril) cap 15 mg  15 mg Oral Nightly PRN Lisseth Izaguirre MD        [Transfer Hold] ondansetron (Zofran) 4 MG/2ML injection 4 mg  4 mg Intravenous Q6H PRN Lisseth Izaguirre MD   4 mg at 03/05/25 2238    [Transfer Hold] metoclopramide (Reglan) 5 mg/mL injection 5 mg  5 mg Intravenous Q8H PRN Lisseth Izaguirre MD        [Transfer Hold] morphINE PF 2 MG/ML injection 1 mg  1 mg Intravenous Q2H PRN Lisseth Izaguirre MD        Or    [Transfer Hold] morphINE PF 2 MG/ML injection 2 mg  2 mg Intravenous Q2H PRN Lisseth Izaguirre MD        Or    [Transfer Hold] morphINE PF 4 MG/ML injection 4 mg  4 mg Intravenous Q2H PRN Lisseth Izaguirre MD        [Transfer Hold] amLODIPine (Norvasc) tab 10 mg  10 mg Oral Nightly Anjali Self MD   10 mg at 03/06/25 2037    [Transfer Hold] metoprolol succinate ER (Toprol XL) 24 hr tab 75 mg  75 mg Oral QPM Anjali Self MD   75 mg at 03/06/25 1830    [Transfer Hold] atorvastatin (Lipitor) tab 10 mg  10 mg Oral Nightly Anjali Self MD   10 mg at 03/06/25 2037     No current Epic-ordered outpatient medications on file.   [3]   Allergies  Allergen Reactions    Lisinopril SWELLING    Shrimp SWELLING     Throat swelling    Radiology Contrast Iodinated Dyes RASH

## 2025-03-07 NOTE — PROGRESS NOTES
[facility]  Progress Note     Emily Smith  : 3/30/1955    Status: Observation  Day #: 0    Attending: Dayton Mandujano MD  PCP: Rachel Mckinley DO     Assessment and Plan:    Chronic cholecystitis  Cholelithiasis  -general surgery on consult  -CT A/P reviewed  -GB US suggestive of acute cholecystitis  -HIDA ok  -s/p laparoscopic cholecystectomy today  -ADAT    Other  -HTN  -HL        DVT Mechanical Prophylaxis:   SCDs,    DVT Pharmacologic Prophylaxis   Medication   None               Subjective:      Initial Chief Complaint:  abdominal pain    Seen in PACU post-op. Pain controlled.     10 point ROS completed and was negative, except for pertinent positive and negatives stated in subjective.      Objective:      Temp:  [97.6 °F (36.4 °C)-98.1 °F (36.7 °C)] 97.6 °F (36.4 °C)  Pulse:  [48-64] 60  Resp:  [9-18] 10  BP: (142-173)/(54-66) 156/61  SpO2:  [90 %-100 %] 100 %  General:  Alert, no distress  HEENT:  Normocephalic, atraumatic  Cardiac:  Regular rate, regular rhythm  Pulmonary:  Clear to auscultation bilaterally, respirations unlabored  Gastrointestinal:  Soft  Musculoskeletal:  No joint swelling  Extremities:  No edema, no cyanosis, no clubbing  Neurologic:  nonfocal  Psychiatric:  Normal affect, calm and appropriate    Intake/Output Summary (Last 24 hours) at 3/7/2025 1332  Last data filed at 3/7/2025 1225  Gross per 24 hour   Intake 500 ml   Output 5 ml   Net 495 ml         Recent Labs   Lab 25  1057 25  0616 25  0607   WBC 11.5* 9.1 7.5   HGB 13.8 12.7 11.9*   HCT 38.5 37.2 34.2*   .0 257.0 245.0   RBC 4.40 4.07 3.78*   MCV 87.5 91.4 90.5   MCH 31.4 31.2 31.5   MCHC 35.8 34.1 34.8   RDW 12.1 12.2 12.2   NEPRELIM 10.20* 6.12 4.66     Recent Labs   Lab 25  1057 25  0616 25  0607   BUN 14 8* 7*   CREATSERUM 1.05* 0.86 0.86   CA 9.6 8.3* 8.4*   ALB 4.9*  --   --     141 142   K 3.1* 3.1* 3.3*  3.3*    107 108   CO2 25.0 25.0 26.0   GLU  143* 111* 83   BILT 0.6  --   --    AST 22  --   --    ALT 11  --   --    ALKPHO 56  --   --    TP 7.9  --   --    LIP 27  --   --        NM GB HEPATOBILIARY SCAN  (CPT=78226)    Result Date: 3/6/2025  CONCLUSION:  No scintigraphic evidence of acute cholecystitis or biliary obstruction.    Dictated by (CST): Orlando Mccray MD on 3/06/2025 at 12:58 PM     Finalized by (CST): Orlando Mccray MD on 3/06/2025 at 1:00 PM             Medications:   potassium chloride  40 mEq Intravenous Once    cefTRIAXone  2 g Intravenous Q24H    metroNIDAZOLE  500 mg Intravenous Q12H    [Transfer Hold] amLODIPine  10 mg Oral Nightly    [Transfer Hold] metoprolol succinate ER  75 mg Oral QPM    [Transfer Hold] atorvastatin  10 mg Oral Nightly      PRN Meds:   lactated ringers    atropine    naloxone    fentaNYL    fentaNYL    HYDROmorphone    HYDROmorphone    HYDROmorphone    morphINE PF    [Transfer Hold] ondansetron    [Transfer Hold] metoclopramide    Supplementary Documentation:   DVT Mechanical Prophylaxis:   SCDs,    DVT Pharmacologic Prophylaxis   Medication   None                Code Status: Full Code  Hanson: No urinary catheter in place  Hanson Duration (in days):   Central line:    RICKI: 3/8/2025        **Certification      PHYSICIAN Certification of Need for Inpatient Hospitalization - Initial Certification    Patient will require inpatient services that will reasonably be expected to span two midnight's based on the clinical documentation in H+P.   Based on patients current state of illness, I anticipate that, after discharge, patient will require TBD.            [facility] High. Time spent on chart/note review, review of labs/imaging, discussion with patient, physical exam, discussion with staff, consultants, coordinating care, writing progress note, discussion of plan of care.      Dayton Mandujano MD

## 2025-03-07 NOTE — PLAN OF CARE
Emily is aox4, NPO for surgery, on clear liquid diet, and tolerating, 6x lap sites, voiding freely. Ambulating independently after surgery. IVF continued. Electrolytes replaced per protocol. Denies pain. Denies nausea. Call light within reach, calls appropriately. Safety precautions in place.     Problem: Patient Centered Care  Goal: Patient preferences are identified and integrated in the patient's plan of care  Description: Interventions:  - What would you like us to know as we care for you?   - Provide timely, complete, and accurate information to patient/family  - Incorporate patient and family knowledge, values, beliefs, and cultural backgrounds into the planning and delivery of care  - Encourage patient/family to participate in care and decision-making at the level they choose  - Honor patient and family perspectives and choices  Outcome: Progressing     Problem: Patient/Family Goals  Goal: Patient/Family Long Term Goal  Description: Patient's Long Term Goal:     Interventions:  -   - See additional Care Plan goals for specific interventions  Outcome: Progressing  Goal: Patient/Family Short Term Goal  Description: Patient's Short Term Goal:     Interventions:   -  - See additional Care Plan goals for specific interventions  Outcome: Progressing     Problem: PAIN - ADULT  Goal: Verbalizes/displays adequate comfort level or patient's stated pain goal  Description: INTERVENTIONS:  - Encourage pt to monitor pain and request assistance  - Assess pain using appropriate pain scale  - Administer analgesics based on type and severity of pain and evaluate response  - Implement non-pharmacological measures as appropriate and evaluate response  - Consider cultural and social influences on pain and pain management  - Manage/alleviate anxiety  - Utilize distraction and/or relaxation techniques  - Monitor for opioid side effects  - Notify MD/LIP if interventions unsuccessful or patient reports new pain  - Anticipate  increased pain with activity and pre-medicate as appropriate  Outcome: Progressing     Problem: SAFETY ADULT - FALL  Goal: Free from fall injury  Description: INTERVENTIONS:  - Assess pt frequently for physical needs  - Identify cognitive and physical deficits and behaviors that affect risk of falls.  - [place] fall precautions as indicated by assessment.  - Educate pt/family on patient safety including physical limitations  - Instruct pt to call for assistance with activity based on assessment  - Modify environment to reduce risk of injury  - Provide assistive devices as appropriate  - Consider OT/PT consult to assist with strengthening/mobility  - Encourage toileting schedule  Outcome: Progressing

## 2025-03-07 NOTE — ANESTHESIA POSTPROCEDURE EVALUATION
Patient: Emily Smith    Procedure Summary       Date: 03/07/25 Room / Location: [facility] MAIN OR 01 / EM MAIN OR    Anesthesia Start: 1107 Anesthesia Stop: 1224    Procedure: LAPAROSCOPIC CHOLECYSTECTOMY (Abdomen) Diagnosis:       Cholecystitis      (Cholecystitis [K81.9])    Surgeons: Osmany Slade MD Anesthesiologist: Clinton Sanchez MD    Anesthesia Type: general ASA Status: 2            Anesthesia Type: general    Vitals Value Taken Time   /63 03/07/25 1222   Temp 97.6 03/07/25 1224   Pulse 70 03/07/25 1223   Resp 8 03/07/25 1223   SpO2 100 % 03/07/25 1223   Vitals shown include unfiled device data.    [facility] AN Post Evaluation:   Patient Evaluated in PACU  Patient Participation: complete - patient participated  Level of Consciousness: awake  Pain Score: 0  Pain Management: adequate  Airway Patency:  Dental exam unchanged from preop  Yes    Nausea/Vomiting: none  Cardiovascular Status: acceptable  Respiratory Status: acceptable  Postoperative Hydration acceptable      Bertha Macdonald CRNA  3/7/2025 12:24 PM

## 2025-03-08 VITALS
DIASTOLIC BLOOD PRESSURE: 45 MMHG | SYSTOLIC BLOOD PRESSURE: 136 MMHG | RESPIRATION RATE: 16 BRPM | WEIGHT: 171.06 LBS | HEART RATE: 50 BPM | BODY MASS INDEX: 31 KG/M2 | OXYGEN SATURATION: 97 % | TEMPERATURE: 99 F

## 2025-03-08 LAB
ALBUMIN SERPL-MCNC: 3.7 G/DL (ref 3.2–4.8)
ALBUMIN/GLOB SERPL: 1.5 {RATIO} (ref 1–2)
ALP LIVER SERPL-CCNC: 67 U/L
ALT SERPL-CCNC: 73 U/L
ANION GAP SERPL CALC-SCNC: 8 MMOL/L (ref 0–18)
AST SERPL-CCNC: 107 U/L (ref ?–34)
BASOPHILS # BLD AUTO: 0.01 X10(3) UL (ref 0–0.2)
BASOPHILS NFR BLD AUTO: 0.1 %
BILIRUB DIRECT SERPL-MCNC: 0.2 MG/DL (ref ?–0.3)
BILIRUB SERPL-MCNC: 0.6 MG/DL (ref 0.2–1.1)
BUN BLD-MCNC: 10 MG/DL (ref 9–23)
BUN/CREAT SERPL: 11.1 (ref 10–20)
CALCIUM BLD-MCNC: 7.9 MG/DL (ref 8.7–10.4)
CHLORIDE SERPL-SCNC: 106 MMOL/L (ref 98–112)
CO2 SERPL-SCNC: 24 MMOL/L (ref 21–32)
CREAT BLD-MCNC: 0.9 MG/DL
DEPRECATED RDW RBC AUTO: 39.3 FL (ref 35.1–46.3)
EGFRCR SERPLBLD CKD-EPI 2021: 70 ML/MIN/1.73M2 (ref 60–?)
EOSINOPHIL # BLD AUTO: 0 X10(3) UL (ref 0–0.7)
EOSINOPHIL NFR BLD AUTO: 0 %
ERYTHROCYTE [DISTWIDTH] IN BLOOD BY AUTOMATED COUNT: 12 % (ref 11–15)
GLOBULIN PLAS-MCNC: 2.4 G/DL (ref 2–3.5)
GLUCOSE BLD-MCNC: 158 MG/DL (ref 70–99)
HCT VFR BLD AUTO: 33.3 %
HGB BLD-MCNC: 11.6 G/DL
IMM GRANULOCYTES # BLD AUTO: 0.04 X10(3) UL (ref 0–1)
IMM GRANULOCYTES NFR BLD: 0.3 %
LIPASE SERPL-CCNC: 25 U/L (ref 12–53)
LYMPHOCYTES # BLD AUTO: 1.19 X10(3) UL (ref 1–4)
LYMPHOCYTES NFR BLD AUTO: 10.4 %
MCH RBC QN AUTO: 30.9 PG (ref 26–34)
MCHC RBC AUTO-ENTMCNC: 34.8 G/DL (ref 31–37)
MCV RBC AUTO: 88.8 FL
MONOCYTES # BLD AUTO: 0.91 X10(3) UL (ref 0.1–1)
MONOCYTES NFR BLD AUTO: 7.9 %
NEUTROPHILS # BLD AUTO: 9.31 X10 (3) UL (ref 1.5–7.7)
NEUTROPHILS # BLD AUTO: 9.31 X10(3) UL (ref 1.5–7.7)
NEUTROPHILS NFR BLD AUTO: 81.3 %
OSMOLALITY SERPL CALC.SUM OF ELEC: 288 MOSM/KG (ref 275–295)
PLATELET # BLD AUTO: 246 10(3)UL (ref 150–450)
POTASSIUM SERPL-SCNC: 3.9 MMOL/L (ref 3.5–5.1)
POTASSIUM SERPL-SCNC: 3.9 MMOL/L (ref 3.5–5.1)
PROT SERPL-MCNC: 6.1 G/DL (ref 5.7–8.2)
RBC # BLD AUTO: 3.75 X10(6)UL
SODIUM SERPL-SCNC: 138 MMOL/L (ref 136–145)
WBC # BLD AUTO: 11.5 X10(3) UL (ref 4–11)

## 2025-03-08 PROCEDURE — 99239 HOSP IP/OBS DSCHRG MGMT >30: CPT | Performed by: HOSPITALIST

## 2025-03-08 RX ORDER — OXYCODONE HYDROCHLORIDE 5 MG/1
5 TABLET ORAL EVERY 6 HOURS PRN
Qty: 12 TABLET | Refills: 0 | Status: SHIPPED | OUTPATIENT
Start: 2025-03-08 | End: 2025-03-12 | Stop reason: ALTCHOICE

## 2025-03-08 RX ORDER — LIDOCAINE 50 MG/G
1 PATCH TOPICAL
Status: SHIPPED | COMMUNITY
Start: 2025-03-08

## 2025-03-08 NOTE — DISCHARGE SUMMARY
[facility]  Discharge Summary     Emily Smith  : 3/30/1955    Status: Observation  Day #: 0    Attending: Dayton Mandujano MD  PCP: Rachel Mckinley DO     Date of Admission:  3/5/2025  Date of Discharge:  3/8/2025     Hospital Discharge Diagnoses:     Acute on chronic cholecystitis  Cholelithiasis  HTN  HL      History of Present Illness:     Copied from Admission H&P:    Patient is a 69-year-old  female, who came into the emergency department for evaluation of 3 days of epigastric abdominal pain associated with nausea and vomiting. CBC showed white blood cell count of 11.5 with left shift. Chemistry and liver function tests roughly unremarkable. Lipase was negative. Gallbladder ultrasound showed acute cholecystitis, no biliary ductal dilation. Patient was started on IV antibiotics in the emergency room, and she will be admitted to the hospital for further management.       Hospital Course:     Acute on chronic cholecystitis  Cholelithiasis  -general surgery on consult  -CT A/P reviewed  -GB US suggestive of acute cholecystitis  -HIDA ok  -s/p laparoscopic cholecystectomy 3/9  -doing well, tolerating diet       Consultants         Provider   Role Specialty     Osmany Slade MD      Consulting Physician SURGERY, GENERAL          Surgical Procedures       Case IDs Date Procedure Surgeon Location Status    [id number] 3/7/25 LAPAROSCOPIC CHOLECYSTECTOMY Osmany Slade MD [facility] MAIN OR [facility]           Physical Exam:   Blood pressure 136/45, pulse 50, temperature 98.7 °F (37.1 °C), temperature source Oral, resp. rate 16, weight 171 lb 1.2 oz (77.6 kg), SpO2 97%, not currently breastfeeding.  General:  Alert, no distress  HEENT:  Normocephalic, atraumatic  Cardiac:  Regular rate, regular rhythm  Pulmonary:  Clear to auscultation bilaterally, respirations unlabored  Gastrointestinal:  Soft, non-tender, normal bowel sounds  Musculoskeletal:  No joint swelling  Extremities:  No edema, no  cyanosis, no clubbing  Neurologic:  nonfocal  Psychiatric:  Normal affect, calm and appropriate         Discharge Medications        START taking these medications        Instructions Prescription details   oxyCODONE 5 MG Tabs      Take 1 tablet (5 mg total) by mouth every 6 (six) hours as needed for Pain.   Quantity: 12 tablet  Refills: 0            CONTINUE taking these medications        Instructions Prescription details   amLODIPine 10 MG Tabs  Commonly known as: Norvasc      Take 1 tablet (10 mg total) by mouth nightly.   Quantity: 90 tablet  Refills: 2     hydroCHLOROthiazide 25 MG Tabs      Take 1 tablet (25 mg total) by mouth daily.   Quantity: 90 tablet  Refills: 3     lidocaine 5 % Ptch  Commonly known as: Lidoderm      Place 1 patch onto the skin Q24H PRN (pain on left thigh).   Refills: 0     metoprolol succinate ER 50 MG Tb24  Commonly known as: Toprol XL      Take 1.5 tablets (75 mg total) by mouth every evening.   Quantity: 135 tablet  Refills: 0     pravastatin 40 MG Tabs  Commonly known as: Pravachol      Take 1 tablet (40 mg total) by mouth nightly.   Quantity: 90 tablet  Refills: 0               Where to Get Your Medications        These medications were sent to [id number] - [place], [place] - [address] AT [facility] [address], [phone], [phone] [address], [facility] [address]      Phone: [phone]   oxyCODONE 5 MG Tabs        Follow-up Information       Osmany Slade MD Follow up in 2 week(s).    Specialty: SURGERY, GENERAL  Contact information:  [address] [place], [place] [id number]  [facility] [address] [phone]               Rachel Mckinley DO. Schedule an appointment as soon as possible for a visit in 1 week(s).    Specialty: Family Medicine  Contact information:  [address] [facility]  [address]  [facility] [id number] [phone]                             Hospital Discharge Diagnoses:  acute on chronic cholecystitis    Lace+ Score: 38  59-90 High  Risk  29-58 Medium Risk  0-28   Low Risk.    TCM Follow-Up Recommendation:  LACE 29-58: Moderate Risk of readmission after discharge from the hospital.        I spent >30 minutes on this discharge. Discussed treatment and discharge plans.       Dayton Mandujano MD

## 2025-03-08 NOTE — DISCHARGE INSTRUCTIONS
Home Care Instructions  Cholecystectomy      WHAT TO EXPECT  You may feel pain at the incisions. This is due to stitches placed during the surgery.    You may feel pain in the shoulders. This is due to irritation of the diaphragm by the air used to inflate the abdomen.    You may feel a sore throat. This is due to the breathing tube used during surgery.     You may feel mild nausea and vomiting for the first 24 hours, this should resolve quickly.    You may have constipation, especially if taking narcotic pain medications. If you have not had a bowel movement by 48 hours after surgery, take Miralax 17g (one cap full) every 12 hours until you have a bowel movement. If another 24 hours goes by without a bowel movement, then take a dose of magnesium citrate or milk of magnesia.     MEDICATIONS  Take 2 Extra Strength Tylenol (1000mg every) 8 hours for pain. For the first 3 days it is best to take the Tylenol every 8 hours even if you do not feel much pain.     For moderate to severe pain take one Oxycodone pill (5mg) or Norco (325/5mg) every six hours as needed for pain. If you do not feel that narcotics are necessary you shouldn’t take them. If the pain is severe you can take two pills (10mg) every six hours.    You can take Advil (ibuprofen) 800mg every 8 hours or Alleve (naproxen) 500mg every 12 hours.     Please ask your surgeon before resuming blood thinners such as Aspirin, Plavix, Coumadin, Warfarin, Eliquis, or Xarelto. All other home medications may be resumed as scheduled.    Norco contains acetaminophen which is the active ingredient in Tylenol. Do not exceed the recommended dose of 4000mg of Acetaminophen within 24 hours from all sources (norco and tylenol).    DIET  Start with a light and bland diet and slowly advance to regular food as your appetite improves. There are no specific food restrictions. Do not eat excessively. Eat small frequent meals.     Drink plenty of water. Try to eat a healthy high fiber  diet.    Do not drink alcohol (beer, wine, liquor) or use tobacco products.    WOUND CARE  You can shower 24 hours after surgery and get the dressings wet.    The skin glue will stay on for 10 to 14 days after surgery.     Soap and water can get on the incisions but do not scrub the wounds. No hair dye or chemicals of any kind should get on the incisions.     Do not apply any topical ointments such as Neosporin or Hydrogen Peroxide.    Do not swim or submerge the incisions under water for 1 month.    ACTIVITY  Every day you should be up walking around the house. Do not lie in bed all day. Staying active prevents blood clots and pneumonia.    You can go up and down stairs. Do not lift more than 20 pounds or perform strenuous activity that requires straining the core muscles.    You may ride in a car but should not drive the car for at least one week.     APPOINTMENT  Please call our office  to make an appointment.    For questions or concerns please call our office between 8:30 a.m. and 5 p.m. Monday through Friday. The number above directs to the answering service after hours to reach the on-call physician.    Please call our office immediately for fever greater than 100.5, excess bleeding, inability to urinate, severe abdominal pain, severe diarrhea, uncontrollable vomiting.      For life threatening emergencies such as severe chest pain, difficulty breathing, or loss of conciousness call 911.     02-Apr-2017 07:04

## 2025-03-08 NOTE — PLAN OF CARE
Orders received for patient discharge. Patient transported home with daughter. All discharge instructions were discussed with patient and/or family. Patient verbalizes understanding and agreeable to plan of care & follow-up plan as outlined in discharge summary / AVS and had no further questions regarding discharge instruction. All patient belongings were transported with patient/family at time of discharge.    Emily is aox4, tolerating diet denies nausea, ambulating ad giselle, voiding freely, no bm per pt. Denies pain only sore. IV abx given. Call light within reach, calls appropriately. Safety precautions in place.     Problem: Patient Centered Care  Goal: Patient preferences are identified and integrated in the patient's plan of care  Description: Interventions:  - What would you like us to know as we care for you?   - Provide timely, complete, and accurate information to patient/family  - Incorporate patient and family knowledge, values, beliefs, and cultural backgrounds into the planning and delivery of care  - Encourage patient/family to participate in care and decision-making at the level they choose  - Honor patient and family perspectives and choices  Outcome: Adequate for Discharge     Problem: Patient/Family Goals  Goal: Patient/Family Long Term Goal  Description: Patient's Long Term Goal:     Interventions:  -   - See additional Care Plan goals for specific interventions  Outcome: Adequate for Discharge  Goal: Patient/Family Short Term Goal  Description: Patient's Short Term Goal:     Interventions:   -  - See additional Care Plan goals for specific interventions  Outcome: Adequate for Discharge     Problem: PAIN - ADULT  Goal: Verbalizes/displays adequate comfort level or patient's stated pain goal  Description: INTERVENTIONS:  - Encourage pt to monitor pain and request assistance  - Assess pain using appropriate pain scale  - Administer analgesics based on type and severity of pain and evaluate  response  - Implement non-pharmacological measures as appropriate and evaluate response  - Consider cultural and social influences on pain and pain management  - Manage/alleviate anxiety  - Utilize distraction and/or relaxation techniques  - Monitor for opioid side effects  - Notify MD/LIP if interventions unsuccessful or patient reports new pain  - Anticipate increased pain with activity and pre-medicate as appropriate  Outcome: Adequate for Discharge     Problem: SAFETY ADULT - FALL  Goal: Free from fall injury  Description: INTERVENTIONS:  - Assess pt frequently for physical needs  - Identify cognitive and physical deficits and behaviors that affect risk of falls.  - [place] fall precautions as indicated by assessment.  - Educate pt/family on patient safety including physical limitations  - Instruct pt to call for assistance with activity based on assessment  - Modify environment to reduce risk of injury  - Provide assistive devices as appropriate  - Consider OT/PT consult to assist with strengthening/mobility  - Encourage toileting schedule  Outcome: Adequate for Discharge

## 2025-03-08 NOTE — PLAN OF CARE
Problem: Patient Centered Care  Goal: Patient preferences are identified and integrated in the patient's plan of care  Description: Interventions:  - What would you like us to know as we care for you?   - Provide timely, complete, and accurate information to patient/family  - Incorporate patient and family knowledge, values, beliefs, and cultural backgrounds into the planning and delivery of care  - Encourage patient/family to participate in care and decision-making at the level they choose  - Honor patient and family perspectives and choices  Outcome: Progressing     Problem: Patient/Family Goals  Goal: Patient/Family Long Term Goal  Description: Patient's Long Term Goal:     Interventions:  -   - See additional Care Plan goals for specific interventions  Outcome: Progressing  Goal: Patient/Family Short Term Goal  Description: Patient's Short Term Goal:     Interventions:   -   - See additional Care Plan goals for specific interventions  Outcome: Progressing     Problem: PAIN - ADULT  Goal: Verbalizes/displays adequate comfort level or patient's stated pain goal  Description: INTERVENTIONS:  - Encourage pt to monitor pain and request assistance  - Assess pain using appropriate pain scale  - Administer analgesics based on type and severity of pain and evaluate response  - Implement non-pharmacological measures as appropriate and evaluate response  - Consider cultural and social influences on pain and pain management  - Manage/alleviate anxiety  - Utilize distraction and/or relaxation techniques  - Monitor for opioid side effects  - Notify MD/LIP if interventions unsuccessful or patient reports new pain  - Anticipate increased pain with activity and pre-medicate as appropriate  Outcome: Progressing     Problem: SAFETY ADULT - FALL  Goal: Free from fall injury  Description: INTERVENTIONS:  - Assess pt frequently for physical needs  - Identify cognitive and physical deficits and behaviors that affect risk of  falls.  - [place] fall precautions as indicated by assessment.  - Educate pt/family on patient safety including physical limitations  - Instruct pt to call for assistance with activity based on assessment  - Modify environment to reduce risk of injury  - Provide assistive devices as appropriate  - Consider OT/PT consult to assist with strengthening/mobility  - Encourage toileting schedule  Outcome: Progressing     Pt resting in bed. Denies pain, nausea or SOB. Dressings to abdomen c/d/I.  Ice pack in use prn. Ambulates in room independently. IVF continued. IV antibiotic given. New IV placed. Voiding without difficulty. Burping and passing gas. Safety measures in place. Frequent rounding being done.

## 2025-03-08 NOTE — PROGRESS NOTES
[facility]  Progress Note    Emily Smith Patient Status:  Observation    3/30/1955 MRN [id number]   Location [facility] 4W/SW/SE Attending Dayton Mandujano MD   Hosp Day # 0 PCP Rachel Mckinley DO     Subjective:  The patient was seen and examined at bedside.  No acute events overnight.  She denies significant abdominal pain.  She is tolerating a diet.  She denies nausea or vomiting.  She is passing flatus.  She has not had a bowel movement.    Objective/Physical Exam:  General: Alert, orientated x3.  Cooperative.  No apparent distress.  Vital Signs:  Blood pressure 136/45, pulse 50, temperature 98.7 °F (37.1 °C), temperature source Oral, resp. rate 16, weight 171 lb 1.2 oz (77.6 kg), SpO2 97%, not currently breastfeeding.  Wt Readings from Last 3 Encounters:   25 171 lb 1.2 oz (77.6 kg)   24 175 lb (79.4 kg)   23 169 lb (76.7 kg)     Lungs: No respiratory distress.  Cardiac: Regular rate and rhythm.   Abdomen:  Soft, non distended, mildly tender, with no rebound or guarding.  No peritoneal signs.   Extremities:  No lower extremity edema noted.    Incision: Clean, dry, intact, no erythema      Intake/Output:    Intake/Output Summary (Last 24 hours) at 3/8/2025 1026  Last data filed at 3/7/2025 1333  Gross per 24 hour   Intake 600 ml   Output 5 ml   Net 595 ml     I/O last 3 completed shifts:  In: 600 [I.V.:600]  Out: 5 [Blood:5]  No intake/output data recorded.    Medications:    enoxaparin  40 mg Subcutaneous Daily    cefTRIAXone  2 g Intravenous Q24H    metroNIDAZOLE  500 mg Intravenous Q12H    amLODIPine  10 mg Oral Nightly    metoprolol succinate ER  75 mg Oral QPM    atorvastatin  10 mg Oral Nightly       Labs:  Lab Results   Component Value Date    WBC 11.5 2025    HGB 11.6 2025    HCT 33.3 2025    .0 2025     Lab Results   Component Value Date     2025    K 3.9 2025    K 3.9 2025     2025    CO2  24.0 03/08/2025    BUN 10 03/08/2025    CREATSERUM 0.90 03/08/2025     03/08/2025    CA 7.9 03/08/2025    ALKPHO 67 03/08/2025    ALT 73 03/08/2025     03/08/2025    BILT 0.6 03/08/2025    ALB 3.7 03/08/2025    TP 6.1 03/08/2025     Lab Results   Component Value Date    INR 0.94 10/09/2021         Assessment  Patient Active Problem List   Diagnosis    Essential hypertension    Hyperlipidemia    Prediabetes    History of renal cell carcinoma    Adenomatous colon polyp    Personal history of colon cancer    Calculus of gallbladder with acute cholecystitis without obstruction    Hypokalemia    Acute cholecystitis     POD 1 laparoscopic cholecystectomy      Plan:  Advance to general diet  Tolerating a diet, the patient may discharge home from a general surgical standpoint  Antiemetics and analgesics as needed  Okay to discontinue antibiotics at discharge  Ambulate and up to chair  DVT prophylaxis with Lovenox  Follow-up with Dr. Slade in approximately 2 weeks    Quality:  DVT Mechanical Prophylaxis:   SCDs,    DVT Pharmacologic Prophylaxis   Medication    enoxaparin (Lovenox) 40 MG/0.4ML SUBQ injection 40 mg                Code Status: Full Code  Hanson: No urinary catheter in place  Hanson Duration (in days):   Central line:    RICKI: 3/8/2025        Rachel Jaime PA-C  3/8/2025  10:26 AM

## 2025-03-10 ENCOUNTER — TELEPHONE (OUTPATIENT)
Facility: CLINIC | Age: 70
End: 2025-03-10

## 2025-03-10 NOTE — TELEPHONE ENCOUNTER
Incoming  call from patient requesting a hospital follow up visit. Patient was discharged 3/8.     Please advise. Thank you.

## 2025-03-11 ENCOUNTER — PATIENT OUTREACH (OUTPATIENT)
Dept: CASE MANAGEMENT | Age: 70
End: 2025-03-11

## 2025-03-11 NOTE — PROGRESS NOTES
Transitional Care Management   Discharge Date: 3/8/25  Contact Date: 3/11/2025    Assessment:  (Insert appropriate Smartphrase below after completing flowsheet)  TCM Initial Assessment    General:  Assessment completed with: Patient  Patient Subjective: The patient reports feeing okay. She continues to experience some dizziness.  Chief Complaint: Calculus of gallbladder with acute cholecystitis without obstruction  Hypokalemia  Acute cholecystitis  Verify patient name and  with patient/ caregiver: Yes    Hospital Stay/Discharge:  Tell me what you understand of why you were in the hospital or emergency department: The patient reports having her gall bladder removed  Prior to leaving the hospital were your Discharge Instructions reviewed with you?: Yes  Did you receive a copy of your written Discharge Instructions?: Yes  What questions do you have about your Discharge Instructions?: No  Do you feel better or worse since you left the hospital or emergency department?: Better    Follow - Up Appointment:  Do you have a follow-up appointment?: Yes  Date: 25  Physician:   Are there any barriers to getting to your follow-up appointment?: No    Home Health/DME:  Prior to leaving the hospital was Home Health (HH) arranged for you?: No     Prior to leaving the hospital or emergency department was Durable Medical Equipment (DME), medical supplies, or infusions arranged for you?: No  Are DME/medical supply/infusions needs identified by staff during this assessment?: No     Medications/Diet:  Did any of your medications change, during or after your hospital stay or ED visit?: No    Were you given a different diet per your Discharge Instructions?: Yes  Diet Type: light diet  Reason: Gall bladder removal  Are there any barriers to following that diet?: No     Questions/Concerns:  Do you have any questions or concerns that have not been discussed?: Yes        Follow-up Appointments:  Your appointments       Date &  Time Appointment Department (Center)    Mar 12, 2025 10:30 AM T Hospital Follow Up with Rachel Mckinley DO [facility]              [address] [address]  [phone]            Transitional [facility] Clinic  Was TCC Ordered: No  Was TCC Scheduled: No.   - If yes: []  Advised patient to bring all medications and blood glucose meter/supplies if applicable.    Primary Care Provider (If no TCC appointment)  Does patient already have a PCP appointment scheduled? Yes  Care Manager Scheduled PCP office TCM appointment with patient   -If no appointment scheduled: Explain     Specialist  Does the patient have any other follow-up appointment(s) that need to be scheduled? No   -If yes: Care Manager reviewed upcoming specialist appointments with patient: No   -Does the patient need assistance scheduling appointment(s): No      Book By Date: 3/22/2025

## 2025-03-12 ENCOUNTER — LAB ENCOUNTER (OUTPATIENT)
Dept: LAB | Age: 70
End: 2025-03-12
Attending: FAMILY MEDICINE
Payer: MEDICARE

## 2025-03-12 ENCOUNTER — OFFICE VISIT (OUTPATIENT)
Facility: CLINIC | Age: 70
End: 2025-03-12
Payer: MEDICARE

## 2025-03-12 VITALS
BODY MASS INDEX: 30.73 KG/M2 | HEIGHT: 62 IN | DIASTOLIC BLOOD PRESSURE: 68 MMHG | SYSTOLIC BLOOD PRESSURE: 124 MMHG | HEART RATE: 66 BPM | WEIGHT: 167 LBS | OXYGEN SATURATION: 98 %

## 2025-03-12 DIAGNOSIS — I10 ESSENTIAL HYPERTENSION: ICD-10-CM

## 2025-03-12 DIAGNOSIS — E83.51 HYPOCALCEMIA: ICD-10-CM

## 2025-03-12 DIAGNOSIS — K80.00 CALCULUS OF GALLBLADDER WITH ACUTE CHOLECYSTITIS WITHOUT OBSTRUCTION: Primary | ICD-10-CM

## 2025-03-12 PROBLEM — E87.6 HYPOKALEMIA: Status: RESOLVED | Noted: 2025-03-05 | Resolved: 2025-03-12

## 2025-03-12 PROBLEM — K81.0 ACUTE CHOLECYSTITIS: Status: RESOLVED | Noted: 2025-03-05 | Resolved: 2025-03-12

## 2025-03-12 LAB
ALBUMIN SERPL-MCNC: 4.7 G/DL (ref 3.2–4.8)
ALBUMIN/GLOB SERPL: 1.7 {RATIO} (ref 1–2)
ALP LIVER SERPL-CCNC: 73 U/L
ALT SERPL-CCNC: 40 U/L
ANION GAP SERPL CALC-SCNC: 9 MMOL/L (ref 0–18)
AST SERPL-CCNC: 30 U/L (ref ?–34)
BILIRUB SERPL-MCNC: 0.6 MG/DL (ref 0.2–1.1)
BUN BLD-MCNC: 7 MG/DL (ref 9–23)
BUN/CREAT SERPL: 7 (ref 10–20)
CALCIUM BLD-MCNC: 9.4 MG/DL (ref 8.7–10.4)
CHLORIDE SERPL-SCNC: 103 MMOL/L (ref 98–112)
CO2 SERPL-SCNC: 32 MMOL/L (ref 21–32)
CREAT BLD-MCNC: 1 MG/DL
EGFRCR SERPLBLD CKD-EPI 2021: 62 ML/MIN/1.73M2 (ref 60–?)
FASTING STATUS PATIENT QL REPORTED: NO
GLOBULIN PLAS-MCNC: 2.7 G/DL (ref 2–3.5)
GLUCOSE BLD-MCNC: 111 MG/DL (ref 70–99)
OSMOLALITY SERPL CALC.SUM OF ELEC: 297 MOSM/KG (ref 275–295)
POTASSIUM SERPL-SCNC: 3.3 MMOL/L (ref 3.5–5.1)
PROT SERPL-MCNC: 7.4 G/DL (ref 5.7–8.2)
SODIUM SERPL-SCNC: 144 MMOL/L (ref 136–145)

## 2025-03-12 PROCEDURE — 3074F SYST BP LT 130 MM HG: CPT | Performed by: FAMILY MEDICINE

## 2025-03-12 PROCEDURE — 1111F DSCHRG MED/CURRENT MED MERGE: CPT | Performed by: FAMILY MEDICINE

## 2025-03-12 PROCEDURE — 36415 COLL VENOUS BLD VENIPUNCTURE: CPT

## 2025-03-12 PROCEDURE — 1160F RVW MEDS BY RX/DR IN RCRD: CPT | Performed by: FAMILY MEDICINE

## 2025-03-12 PROCEDURE — 99496 TRANSJ CARE MGMT HIGH F2F 7D: CPT | Performed by: FAMILY MEDICINE

## 2025-03-12 PROCEDURE — 3008F BODY MASS INDEX DOCD: CPT | Performed by: FAMILY MEDICINE

## 2025-03-12 PROCEDURE — 1159F MED LIST DOCD IN RCRD: CPT | Performed by: FAMILY MEDICINE

## 2025-03-12 PROCEDURE — 3078F DIAST BP <80 MM HG: CPT | Performed by: FAMILY MEDICINE

## 2025-03-12 PROCEDURE — 80053 COMPREHEN METABOLIC PANEL: CPT

## 2025-03-12 RX ORDER — HYDROCHLOROTHIAZIDE 25 MG/1
25 TABLET ORAL DAILY
Qty: 90 TABLET | Refills: 3 | Status: SHIPPED | OUTPATIENT
Start: 2025-03-12

## 2025-03-12 NOTE — OPERATIVE REPORT
[facility]    PATIENT'S NAME: WATSON BALDWIN   ATTENDING PHYSICIAN: Dayton Mandujano MD   OPERATING PHYSICIAN: Osmany Slade MD   PATIENT ACCOUNT#:   [id number]    LOCATION:  [address] [place] [address]  MEDICAL RECORD #:   [id number]       YOB: 1955  ADMISSION DATE:       03/05/2025      OPERATION DATE:  03/07/2025    OPERATIVE REPORT    PREOPERATIVE DIAGNOSIS:  Cholelithiasis.  POSTOPERATIVE DIAGNOSIS:  Cholelithiasis.  PROCEDURE:  Laparoscopic cholecystectomy.    ASSISTANT:  DEDE Skaggs    ANESTHESIA:  General.    BLOOD LOSS:  3 mL.    SPECIMEN:  Gallbladder.    DRAINS:  None.    COMPLICATIONS:  None.    DISPOSITION:  Stable.    FINDINGS:  Chronic cholecystitis, cholelithiasis.    OPERATIVE TECHNIQUE:  Patient came to the operating room, underwent general endotracheal anesthesia.  Compression boots were placed.  Prepped and draped the abdomen.  Began by making a supraumbilical incision.  Under direct vision, inserted a #11 bladeless trocar at the upper midline 11 and 2 lateral 5 mm bladeless trocars placed under direct vision.  Quickly went to the gallbladder; a little dilated.  We did aspirate this.  We then located the base of the gallbladder, located the cystic duct, cystic artery in 2 views.  The patient had no evidence of choledocholithiasis preop.  We therefore clipped the cystic duct, divided the cystic duct, Endoloop placed around its end.  Cystic artery triply clipped and divided.  Gallbladder removed with electrocoagulation, placed in Endobag and removed.  Checked for any residual bleeding; there was none, though we did place a Surgicel spray into the depth of the liver bed.  All instruments removed under direct vision, fascia closed with Vicryl, skin with subcuticular Monocryl and Dermabond, infiltrated with 0.5% Marcaine without.  No complications.  The patient tolerated the procedure well and left the operating room in a stable condition.    Dictated By Omsany ALVARENGA  MD [place]  d: 03/11/2025 17:04:32  t: 03/11/2025 22:06:29  [facility] [id number]  [facility]/

## 2025-03-12 NOTE — PROGRESS NOTES
Subjective:   Emily Smith is a 69 year old female who presents for hospital follow up.   She was discharged from [facility] to Home or Self Care  Admission Date: 3/5/25   Discharge Date: 3/8/25  Hospital Discharge Diagnosis: Acute cholecystitis     Interactive contact within 2 business days post discharge first initiated on Date: 3/11/2025    I accessed [employer] and/or Seat[employer] Everywhere and personally reviewed the following for the recent hospitalization: provider notes, consults, summaries, labs and other test results and the pertinent findings are documented below.     HPI: 69-year-old female here for hospital follow-up due to acute cholecystitis status post laparoscopic cholecystectomy.  Seen in the ER on 3/5 due to abdominal pain and vomiting and was diagnosed with acute cholecystitis.  Started on IV antibiotics initially and had surgery two days later.   Surgery went well, and she left the day after surgery.  She is feeling dizziness when she gets up to walk, but not at rest.  She has a decreased appetite still, but drinking a few bottles of water a day.  Denies any abdominal pain, and has only taken Tylenol.   She sees the surgeon on 3/24 for a follow-up visit.   She does check her blood pressure at home, and it has been 140/150 on average systolic with normal diastolic readings. Heart rate runs 50-60 on average.     History/Other:   Current Medications:  Medication Reconciliation:  I am aware of an inpatient discharge within the last 30 days.  The discharge medication list has been reconciled with the patient's current medication list and reviewed by me. See medication list for additions of new medication, and changes to current doses of medications and discontinued medications.  Outpatient Medications Marked as Taking for the 3/12/25 encounter (Office Visit) with Rachel Mckinley DO   Medication Sig    hydroCHLOROthiazide 25 MG Oral Tab Take 1 tablet (25 mg total) by mouth daily.    lidocaine 5 %  External Patch Place 1 patch onto the skin Q24H PRN (pain on left thigh).    amLODIPine 10 MG Oral Tab Take 1 tablet (10 mg total) by mouth nightly.    pravastatin 40 MG Oral Tab Take 1 tablet (40 mg total) by mouth nightly.    metoprolol succinate ER 50 MG Oral Tablet 24 Hr Take 1.5 tablets (75 mg total) by mouth every evening.       Review of Systems:  GENERAL: weight loss, energy stable, no sweating  SKIN: denies any unusual skin lesions  EYES: denies blurred vision or double vision  HEENT: denies nasal congestion, sinus pain or ST  LUNGS: denies shortness of breath with exertion  CARDIOVASCULAR: denies chest pain on exertion or palpitations  GI: denies abdominal pain, denies heartburn, denies diarrhea, no constipation, no hematochezia   MUSCULOSKELETAL: denies pain, normal range of motion of extremities  NEURO: denies headaches, dizziness, denies weakness  PSYCHE: denies depression or anxiety  HEMATOLOGIC: denies hx of anemia, bruising, denies bleeding  ENDOCRINE: denies thyroid history  ALL/ASTHMA: denies hx of allergy or asthma    Objective:   No LMP recorded. Patient is postmenopausal.  Estimated body mass index is 30.54 kg/m² as calculated from the following:    Height as of this encounter: 5' 2\" (1.575 m).    Weight as of this encounter: 167 lb (75.8 kg).   /68 (BP Location: Right arm)   Pulse 66   Ht 5' 2\" (1.575 m)   Wt 167 lb (75.8 kg)   SpO2 98%   BMI 30.54 kg/m²    GENERAL: well developed, well nourished, in no apparent distress  SKIN: no rashes, no suspicious lesions, well healing surgical scars   HEENT: atraumatic, normocephalic, right TM normal, left EAC cerumen impaction   EYES: PERRLA, EOMI, conjunctiva are clear  NECK: supple, no adenopathy, no bruits  CHEST: no chest tenderness  LUNGS: clear to auscultation  CARDIO: RRR without murmur  GI: good BS's, no masses, HSM or tenderness  MUSCULOSKELETAL: back is not tender, FROM of the extremities  EXTREMITIES: no cyanosis, clubbing or  edema  NEURO: Oriented times three, cranial nerves are intact, motor and sensory are grossly intact    Assessment & Plan:   1. Calculus of gallbladder with acute cholecystitis without obstruction (Primary)  2. Essential hypertension  -     Comp Metabolic Panel (14); Future; Expected date: 03/12/2025  3. Hypocalcemia  Other orders  -     hydroCHLOROthiazide; Take 1 tablet (25 mg total) by mouth daily.  Dispense: 90 tablet; Refill: 3    Doing very well since cholecystectomy without any pain.  Has had some dizziness with standing, and discussed importance of increasing water hydration and eating a healthy diet.  Check CMP to ensure potassium normal and calcium has improved as well.  Follow-up with surgeon as scheduled.  Hypertension stable on repeat check, and will continue current medications and home monitoring.      Return in about 6 months (around 9/12/2025) for Medicare physical .

## 2025-03-13 RX ORDER — POTASSIUM CHLORIDE 1500 MG/1
20 TABLET, EXTENDED RELEASE ORAL DAILY
Qty: 90 TABLET | Refills: 1 | Status: SHIPPED | OUTPATIENT
Start: 2025-03-13

## 2025-04-14 ENCOUNTER — HOSPITAL ENCOUNTER (OUTPATIENT)
Age: 70
Discharge: HOME | End: 2025-04-14
Payer: MEDICARE

## 2025-04-14 VITALS
OXYGEN SATURATION: 100 % | HEART RATE: 71 BPM | RESPIRATION RATE: 20 BRPM | DIASTOLIC BLOOD PRESSURE: 72 MMHG | SYSTOLIC BLOOD PRESSURE: 138 MMHG | TEMPERATURE: 98 F

## 2025-04-14 DIAGNOSIS — H61.22 IMPACTED CERUMEN OF LEFT EAR: Primary | ICD-10-CM

## 2025-04-14 DIAGNOSIS — S00.412A EAR CANAL ABRASION, LEFT, INITIAL ENCOUNTER: ICD-10-CM

## 2025-04-14 PROCEDURE — 99213 OFFICE O/P EST LOW 20 MIN: CPT | Performed by: NURSE PRACTITIONER

## 2025-04-14 PROCEDURE — 69209 REMOVE IMPACTED EAR WAX UNI: CPT | Performed by: NURSE PRACTITIONER

## 2025-04-14 RX ORDER — OFLOXACIN 3 MG/ML
10 SOLUTION AURICULAR (OTIC) DAILY
Qty: 10 ML | Refills: 0 | Status: SHIPPED | OUTPATIENT
Start: 2025-04-14 | End: 2025-04-21

## 2025-04-14 NOTE — ED INITIAL ASSESSMENT (HPI)
Pt came in due to left ear discomfort, decreased hearing, light headedness, headache, and feeling her balance is off since yesterday. Pt stated she feels her ear are clogged. Pt has easy non labored respirations.

## 2025-04-14 NOTE — ED PROVIDER NOTES
Patient Seen in: [facility]    History     Chief Complaint   Patient presents with    Ear Problem     Stated Complaint: dizizness    Subjective:   70-year-old female with medical conditions as noted below presents with complaints of ear fullness, decreased hearing for the past week.  States yesterday had slight headache and felt a little off balance after the pressure in her left ear increased.  Headache or dizziness/lightheadedness at this time.  No URI symptoms, ear injury, ear drainage, tinnitus, nausea/vomiting, visual changes, chest pain, shortness of breath                    Objective:     Past Medical History:    Anesthesia complication    Cancer of sigmoid colon (HCC)    High blood pressure    High cholesterol    PONV (postoperative nausea and vomiting)    Renal cell carcinoma (HCC)    Visual impairment    glasses              Past Surgical History:   Procedure Laterality Date    Breast surgery  1998    Benign breast cyst    Colonoscopy N/A 08/10/2021    Procedure: COLONOSCOPY;  Surgeon: ROSALINA Mccray MD;  Location: [facility] ENDOSCOPY    Colonoscopy N/A 11/15/2021    Procedure: COLONOSCOPY;  Surgeon: Juan Mahan MD;  Location: [facility] ENDOSCOPY    Colonoscopy N/A 10/20/2022    Procedure: COLONOSCOPY;  Surgeon: Orlando Dumas MD;  [facility]    Kidney surgery  11/23/2021    Tumor removed (Right side)    Laparoscopic cholecystectomy  03/07/2025    Laparoscopy, surgical; partial nephrectomy Right 11/23/2021    right robotic partial nephrectomy with intraoperative ultrasound, Dr. Villegas @ [facility]    Lumpectomy left      Monet localization wire 1 site left (cpt=19281)      1998    Tubal ligation  1990                No pertinent social history.            Review of Systems   Constitutional:  Negative for fever.   HENT:  Positive for ear pain. Negative for congestion and ear discharge.    Eyes:  Negative for photophobia and visual disturbance.   Respiratory:  Negative for cough.     Cardiovascular:  Negative for chest pain.   Neurological:  Positive for light-headedness and headaches.   All other systems reviewed and are negative.      Positive for stated complaint: dizizness  Other systems are as noted in HPI.  Constitutional and vital signs reviewed.      All other systems reviewed and negative except as noted above.    Physical Exam     ED Triage Vitals [04/14/25 0948]   BP (!) 169/60   Pulse 71   Resp 20   Temp 98.1 °F (36.7 °C)   Temp src Oral   SpO2 100 %   O2 Device None (Room air)       Current Vitals:   Vital Signs  BP: 138/72  Pulse: 71  Resp: 20  Temp: 98.1 °F (36.7 °C)  Temp src: Oral    Oxygen Therapy  SpO2: 100 %  O2 Device: None (Room air)        Physical Exam  Vitals and nursing note reviewed.   Constitutional:       General: She is not in acute distress.     Appearance: Normal appearance. She is not ill-appearing.   HENT:      Head: Normocephalic.      Right Ear: Tympanic membrane and external ear normal.      Left Ear: External ear normal. Decreased hearing noted. No drainage, swelling or tenderness. There is impacted cerumen.      Ears:      Comments: Left canal completely occluded with cerumen. Unable to visualized TM     Nose: Nose normal.      Mouth/Throat:      Mouth: Mucous membranes are moist.      Pharynx: Oropharynx is clear.   Cardiovascular:      Rate and Rhythm: Normal rate and regular rhythm.   Pulmonary:      Effort: Pulmonary effort is normal.      Breath sounds: Normal breath sounds.   Musculoskeletal:         General: Normal range of motion.      Cervical back: Normal range of motion and neck supple.   Skin:     General: Skin is warm.      Capillary Refill: Capillary refill takes less than 2 seconds.   Neurological:      General: No focal deficit present.      Mental Status: She is alert and oriented to person, place, and time.      GCS: GCS eye subscore is 4. GCS verbal subscore is 5. GCS motor subscore is 6.   Psychiatric:         Behavior: Behavior is  cooperative.             ED Course   Labs Reviewed - No data to display                              MDM              Medical Decision Making  Patient is well-appearing. No headache or dizziness/lightheadedness  I discussed differentials with patient including but not limited to movement impaction, otitis media, sinusitis, Ménière's disease, vertigo  Left ear irrigated with warm water/hydrogen peroxide by IC tech.  Canal clear of cerumen post irrigation.  TM normal.  Canal with small abrasions.  Rx ofloxacin eardrop to cover abrasions in canal  otc meds prn  Fu with PCP. Strict ED precautions discussed      Problems Addressed:  Ear canal abrasion, left, initial encounter: acute illness or injury  Impacted cerumen of left ear: acute illness or injury        Disposition and Plan     Clinical Impression:  1. Impacted cerumen of left ear    2. Ear canal abrasion, left, initial encounter         Disposition:  Discharge  4/14/2025 10:46 am    Follow-up:  Rachel Mckinley DO  [address] [address]  [phone]                Medications Prescribed:  Discharge Medication List as of 4/14/2025 10:49 AM        START taking these medications    Details   ofloxacin 0.3 % Otic Solution Place 10 drops into the left ear daily for 7 days., Normal, Disp-10 mL, R-0             Supplementary Documentation:

## 2025-04-18 RX ORDER — METOPROLOL SUCCINATE 50 MG/1
75 TABLET, EXTENDED RELEASE ORAL EVERY EVENING
Qty: 135 TABLET | Refills: 3 | Status: SHIPPED | OUTPATIENT
Start: 2025-04-18

## 2025-04-18 RX ORDER — PRAVASTATIN SODIUM 40 MG
40 TABLET ORAL NIGHTLY
Qty: 90 TABLET | Refills: 3 | Status: SHIPPED | OUTPATIENT
Start: 2025-04-18

## 2025-05-21 ENCOUNTER — TELEPHONE (OUTPATIENT)
Facility: CLINIC | Age: 70
End: 2025-05-21

## 2025-05-21 NOTE — TELEPHONE ENCOUNTER
1st Attempt:5/21/25    Left Voice Mail To Call Back    Outreach patient to help schedule Medicare Annual Super Visit.      Last visit: 11/13/24

## 2025-06-04 ENCOUNTER — NURSE TRIAGE (OUTPATIENT)
Facility: CLINIC | Age: 70
End: 2025-06-04

## 2025-06-04 RX ORDER — METOPROLOL SUCCINATE 100 MG/1
100 TABLET, EXTENDED RELEASE ORAL EVERY EVENING
Qty: 90 TABLET | Refills: 0 | Status: SHIPPED | OUTPATIENT
Start: 2025-06-04

## 2025-06-04 NOTE — TELEPHONE ENCOUNTER
Agree with adding on for SDA, and does she know her heart rate? We may want to increase the Metoprolol to 100 mg daily.

## 2025-06-04 NOTE — TELEPHONE ENCOUNTER
Action Requested: Summary for Provider     []  Critical Lab, Recommendations Needed  [x] Need Additional Advice  []   FYI    []   Need Orders  [] Need Medications Sent to Pharmacy  []  Other     SUMMARY: Patient reports blood pressure running higher than normal, getting readings of 162/74, 147/66, 167/83, and one value of 131/105.    Has had some headaches on/off.  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  Denies vision disturbance.  Ears have felt somewhat plugged.  Per protocol, should be seen within 2 weeks.  Dr Mckinley, may we use a SDA time slot?  Please advise?    Reason for call: Acute Elevated blood pressure  Onset: couple of weeks    Spoke with patient, MORRO verified.   Not in distress at time of call  Meds:  Amlodipine 10 mg nightly  Hydrochlorothiazide 25 mg daily  Metoprolol succinate 75 mg every evening  KCL ER  20 mEq daily.      Reason for Disposition   Systolic BP >= 130 OR Diastolic >= 80, and is taking BP medications    Protocols used: Blood Pressure - High-A-OH

## 2025-06-04 NOTE — TELEPHONE ENCOUNTER
Dr Mckinley - Patient reports heart rate of 58, 60, 61    Spoke with patient, (  Name and date of birth verified ) informed of Dr. Mckinley's  instructions below    Appointment made     Future Appointments   Date Time Provider Department Center   6/10/2025  3:00 PM Rachel Mckinley DO [facility]

## 2025-06-04 NOTE — TELEPHONE ENCOUNTER
Let's try increasing Metoprolol to 100 mg daily, and I just sent a new prescription.  If blood pressure worsens or heart rate drops too low please have her call me.

## 2025-06-10 ENCOUNTER — OFFICE VISIT (OUTPATIENT)
Facility: CLINIC | Age: 70
End: 2025-06-10
Payer: MEDICARE

## 2025-06-10 ENCOUNTER — LAB ENCOUNTER (OUTPATIENT)
Dept: LAB | Age: 70
End: 2025-06-10
Attending: FAMILY MEDICINE
Payer: MEDICARE

## 2025-06-10 VITALS
OXYGEN SATURATION: 99 % | WEIGHT: 169 LBS | SYSTOLIC BLOOD PRESSURE: 148 MMHG | BODY MASS INDEX: 31.1 KG/M2 | DIASTOLIC BLOOD PRESSURE: 70 MMHG | HEART RATE: 68 BPM | HEIGHT: 62 IN

## 2025-06-10 DIAGNOSIS — E87.6 HYPOKALEMIA: ICD-10-CM

## 2025-06-10 DIAGNOSIS — I10 ESSENTIAL HYPERTENSION: Primary | ICD-10-CM

## 2025-06-10 DIAGNOSIS — R51.9 ACUTE NONINTRACTABLE HEADACHE, UNSPECIFIED HEADACHE TYPE: ICD-10-CM

## 2025-06-10 LAB
ANION GAP SERPL CALC-SCNC: 8 MMOL/L (ref 0–18)
BUN BLD-MCNC: 14 MG/DL (ref 9–23)
BUN/CREAT SERPL: 13.3 (ref 10–20)
CALCIUM BLD-MCNC: 10 MG/DL (ref 8.7–10.4)
CHLORIDE SERPL-SCNC: 106 MMOL/L (ref 98–112)
CO2 SERPL-SCNC: 30 MMOL/L (ref 21–32)
CREAT BLD-MCNC: 1.05 MG/DL (ref 0.55–1.02)
EGFRCR SERPLBLD CKD-EPI 2021: 58 ML/MIN/1.73M2 (ref 60–?)
FASTING STATUS PATIENT QL REPORTED: NO
GLUCOSE BLD-MCNC: 91 MG/DL (ref 70–99)
OSMOLALITY SERPL CALC.SUM OF ELEC: 298 MOSM/KG (ref 275–295)
POTASSIUM SERPL-SCNC: 3.5 MMOL/L (ref 3.5–5.1)
SODIUM SERPL-SCNC: 144 MMOL/L (ref 136–145)

## 2025-06-10 PROCEDURE — 3078F DIAST BP <80 MM HG: CPT | Performed by: FAMILY MEDICINE

## 2025-06-10 PROCEDURE — 80048 BASIC METABOLIC PNL TOTAL CA: CPT

## 2025-06-10 PROCEDURE — 36415 COLL VENOUS BLD VENIPUNCTURE: CPT

## 2025-06-10 PROCEDURE — 3077F SYST BP >= 140 MM HG: CPT | Performed by: FAMILY MEDICINE

## 2025-06-10 PROCEDURE — 1160F RVW MEDS BY RX/DR IN RCRD: CPT | Performed by: FAMILY MEDICINE

## 2025-06-10 PROCEDURE — 3008F BODY MASS INDEX DOCD: CPT | Performed by: FAMILY MEDICINE

## 2025-06-10 PROCEDURE — 1159F MED LIST DOCD IN RCRD: CPT | Performed by: FAMILY MEDICINE

## 2025-06-10 PROCEDURE — 99214 OFFICE O/P EST MOD 30 MIN: CPT | Performed by: FAMILY MEDICINE

## 2025-06-10 NOTE — PATIENT INSTRUCTIONS
-Purchase new Omron blood pressure cuff  -Check blood pressure daily and send a message or call with readings in the next 2 weeks  -Notify me if headaches not improving  -Try hibiscus tea and increased exercise with healthier diet for weight loss

## 2025-06-10 NOTE — PROGRESS NOTES
CC:    Chief Complaint   Patient presents with    Blood Pressure       HPI: 70 year old female here to follow-up on hypertension.  Started noticing her blood pressure was higher about two weeks ago as she always checks it routinely.  Was getting readings around 130-160/.   She had a slight headache, but no other symptoms.  Tylenol does help the headaches.   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 She did not pass out or lose consciousness, and this occurred May 12th.  Increased her Metoprolol to 100 mg daily on 6/4, and has continued with hydrochlorothiazide and Amlodipine.  She did notice her heart rate got as low as 51 on Saturday.   Has not felt dizzy or lightheaded.   Does not drink caffeine or alcohol.  Denies any dietary changes as well.   Has not been physically active.     ROS:  General:  No fever, no fatigue, no weight changes  HEENT:  Denies congestion or nasal discharge, no ear pain, no blurry vision or vision   Cardio:  No chest pain, no palpitations   Pulmonary:  No cough, no SOB  GI:  No N/V/D  :  No discharge, no dysuria, no polyuria, no hematuria  Dermatologic:  No rashes  Neuro: intermittent headaches, no dizziness     Past Medical History[1]    Social History     Socioeconomic History    Marital status:      Spouse name: Not on file    Number of children: Not on file    Years of education: Not on file    Highest education level: Not on file   Occupational History    Not on file   Tobacco Use    Smoking status: Never    Smokeless tobacco: Never   Vaping Use    Vaping status: Never Used   Substance and Sexual Activity    Alcohol use: Never    Drug use: Never    Sexual activity: Yes     Partners: Male   Other Topics Concern    Caffeine Concern Not Asked    Exercise Not Asked    Seat Belt Not Asked    Special Diet Not Asked    Stress Concern Not Asked    Weight Concern Not Asked   Social History Narrative    Emily  is  to Yesy x 46 yrs. Mother of 3 adult children. She works part time in home health care. Patient lives with  and two of her daughters in [place], [place].     Social Drivers of Health     Food Insecurity: No Food Insecurity (6/10/2025)    NCSS - Food Insecurity     Worried About Running Out of Food in the Last Year: No     Ran Out of Food in the Last Year: No   Transportation Needs: No Transportation Needs (6/10/2025)    NCSS - Transportation     Lack of Transportation: No   Stress: Not on file   Housing Stability: Not At Risk (6/10/2025)    NCSS - Housing/Utilities     Has Housing: Yes     Worried About Losing Housing: No     Unable to Get Utilities: No       Current Medications[2]    Lisinopril, Shrimp, and Radiology contrast iodinated dyes      Vitals:   Vitals:    06/10/25 1443 06/10/25 1514 06/10/25 1515   BP: 140/84 148/70 148/70   Pulse: 68     SpO2: 99%     Weight: 169 lb (76.7 kg)     Height: 5' 2\" (1.575 m)         Body mass index is 30.91 kg/m².    Physical:  General:  Alert, appropriate, no acute distress   HEENT: supple, no tonsillar erythema or exudate, no lymphadenopathy, bilateral TM's normal   Cardio:  RRR, no murmurs, S1, S2  Dermatologic:  No rashes or lesions  Neuro: CN II-XII intact, 5/5 muscle strength bilateral upper and lower extremities, normal cerebellar testing, no focal neurologic deficits       Assessment and Plan: 70-year-old female here to follow-up on hypertension and recent headaches.    1. Essential hypertension    - Blood pressure improved from her home readings, but let her know her cuff is inaccurate and she should purchase a new blood pressure machine  - Recently increased metoprolol to 100 mg daily so we will continue with this, hydrochlorothiazide, and amlodipine for now in addition to try trying to eat better and exercise more regularly to assist with weight loss efforts  - Will check her blood pressure over the next few weeks and send me a message with her  readings, and if still elevated consider switching to an ARB as she had swelling with lisinopril    2. Acute nonintractable headache, unspecified headache type    - Normal neurologic exam and headaches mild per patient, but concerned it is possibly related to her higher blood pressure lately  - Will continue to monitor headaches closely and notify me if they persist or worsen to consider MRI    3. Hypokalemia    - Repeat after potassium supplementation   - Basic Metabolic Panel (8) [E]; Future        Rachel DO Elías  06/10/25  3:02 PM         [1]   Past Medical History:   Anesthesia complication    Cancer of sigmoid colon (HCC)    High blood pressure    High cholesterol    PONV (postoperative nausea and vomiting)    Renal cell carcinoma (HCC)    Visual impairment    glasses   [2]   Current Outpatient Medications   Medication Sig Dispense Refill    metoprolol succinate  MG Oral Tablet 24 Hr Take 1 tablet (100 mg total) by mouth every evening. 90 tablet 0    pravastatin 40 MG Oral Tab Take 1 tablet (40 mg total) by mouth nightly. 90 tablet 3    Potassium Chloride ER 20 MEQ Oral Tab CR Take 20 mEq by mouth daily. 90 tablet 1    hydroCHLOROthiazide 25 MG Oral Tab Take 1 tablet (25 mg total) by mouth daily. 90 tablet 3    lidocaine 5 % External Patch Place 1 patch onto the skin Q24H PRN (pain on left thigh).      amLODIPine 10 MG Oral Tab Take 1 tablet (10 mg total) by mouth nightly. 90 tablet 2

## 2025-07-07 ENCOUNTER — HOSPITAL ENCOUNTER (EMERGENCY)
Facility: HOSPITAL | Age: 70
Discharge: HOME | End: 2025-07-08
Attending: EMERGENCY MEDICINE
Payer: MEDICARE

## 2025-07-07 DIAGNOSIS — R51.9 NONINTRACTABLE HEADACHE, UNSPECIFIED CHRONICITY PATTERN, UNSPECIFIED HEADACHE TYPE: Primary | ICD-10-CM

## 2025-07-07 PROCEDURE — 36415 COLL VENOUS BLD VENIPUNCTURE: CPT

## 2025-07-07 PROCEDURE — 99284 EMERGENCY DEPT VISIT MOD MDM: CPT

## 2025-07-07 RX ORDER — ONDANSETRON 2 MG/ML
4 INJECTION INTRAMUSCULAR; INTRAVENOUS ONCE
Status: COMPLETED | OUTPATIENT
Start: 2025-07-07 | End: 2025-07-08

## 2025-07-08 ENCOUNTER — APPOINTMENT (OUTPATIENT)
Dept: CT IMAGING | Facility: HOSPITAL | Age: 70
End: 2025-07-08
Attending: EMERGENCY MEDICINE
Payer: MEDICARE

## 2025-07-08 VITALS
RESPIRATION RATE: 15 BRPM | SYSTOLIC BLOOD PRESSURE: 156 MMHG | HEART RATE: 54 BPM | WEIGHT: 169 LBS | BODY MASS INDEX: 31.1 KG/M2 | OXYGEN SATURATION: 96 % | TEMPERATURE: 97 F | HEIGHT: 62 IN | DIASTOLIC BLOOD PRESSURE: 45 MMHG

## 2025-07-08 LAB
ANION GAP SERPL CALC-SCNC: 4 MMOL/L (ref 0–18)
BASOPHILS # BLD AUTO: 0.02 X10(3) UL (ref 0–0.2)
BASOPHILS NFR BLD AUTO: 0.2 %
BUN BLD-MCNC: 13 MG/DL (ref 9–23)
BUN/CREAT SERPL: 11.8 (ref 10–20)
CALCIUM BLD-MCNC: 9.7 MG/DL (ref 8.7–10.4)
CHLORIDE SERPL-SCNC: 107 MMOL/L (ref 98–112)
CO2 SERPL-SCNC: 28 MMOL/L (ref 21–32)
CREAT BLD-MCNC: 1.1 MG/DL (ref 0.55–1.02)
DEPRECATED RDW RBC AUTO: 40.4 FL (ref 35.1–46.3)
EGFRCR SERPLBLD CKD-EPI 2021: 55 ML/MIN/1.73M2 (ref 60–?)
EOSINOPHIL # BLD AUTO: 0.11 X10(3) UL (ref 0–0.7)
EOSINOPHIL NFR BLD AUTO: 1.3 %
ERYTHROCYTE [DISTWIDTH] IN BLOOD BY AUTOMATED COUNT: 12.1 % (ref 11–15)
GLUCOSE BLD-MCNC: 105 MG/DL (ref 70–99)
HCT VFR BLD AUTO: 37.6 % (ref 35–48)
HGB BLD-MCNC: 12.7 G/DL (ref 12–16)
IMM GRANULOCYTES # BLD AUTO: 0.02 X10(3) UL (ref 0–1)
IMM GRANULOCYTES NFR BLD: 0.2 %
LYMPHOCYTES # BLD AUTO: 2.99 X10(3) UL (ref 1–4)
LYMPHOCYTES NFR BLD AUTO: 35.9 %
MCH RBC QN AUTO: 30.8 PG (ref 26–34)
MCHC RBC AUTO-ENTMCNC: 33.8 G/DL (ref 31–37)
MCV RBC AUTO: 91.3 FL (ref 80–100)
MONOCYTES # BLD AUTO: 0.64 X10(3) UL (ref 0.1–1)
MONOCYTES NFR BLD AUTO: 7.7 %
NEUTROPHILS # BLD AUTO: 4.55 X10 (3) UL (ref 1.5–7.7)
NEUTROPHILS # BLD AUTO: 4.55 X10(3) UL (ref 1.5–7.7)
NEUTROPHILS NFR BLD AUTO: 54.7 %
OSMOLALITY SERPL CALC.SUM OF ELEC: 288 MOSM/KG (ref 275–295)
PLATELET # BLD AUTO: 267 10(3)UL (ref 150–450)
POTASSIUM SERPL-SCNC: 4 MMOL/L (ref 3.5–5.1)
RBC # BLD AUTO: 4.12 X10(6)UL (ref 3.8–5.3)
SODIUM SERPL-SCNC: 139 MMOL/L (ref 136–145)
WBC # BLD AUTO: 8.3 X10(3) UL (ref 4–11)

## 2025-07-08 PROCEDURE — 70450 CT HEAD/BRAIN W/O DYE: CPT | Performed by: RADIOLOGY

## 2025-07-08 PROCEDURE — 85025 COMPLETE CBC W/AUTO DIFF WBC: CPT | Performed by: EMERGENCY MEDICINE

## 2025-07-08 PROCEDURE — 80048 BASIC METABOLIC PNL TOTAL CA: CPT | Performed by: EMERGENCY MEDICINE

## 2025-07-08 NOTE — ED INITIAL ASSESSMENT (HPI)
Patient to the ED from home d/y HA. States she's been having headaches on and off for about a month. Noticed her Bp has been elevated recently, saw her primary and had her metoprolol dose increased. Denies any CP. A&Ox4.

## 2025-07-17 PROCEDURE — 99284 EMERGENCY DEPT VISIT MOD MDM: CPT

## 2025-07-18 ENCOUNTER — PATIENT OUTREACH (OUTPATIENT)
Age: 70
End: 2025-07-18

## 2025-07-18 ENCOUNTER — HOSPITAL ENCOUNTER (EMERGENCY)
Facility: HOSPITAL | Age: 70
Discharge: HOME | End: 2025-07-18
Attending: STUDENT IN AN ORGANIZED HEALTH CARE EDUCATION/TRAINING PROGRAM
Payer: MEDICARE

## 2025-07-18 VITALS
DIASTOLIC BLOOD PRESSURE: 55 MMHG | WEIGHT: 169 LBS | RESPIRATION RATE: 17 BRPM | OXYGEN SATURATION: 99 % | SYSTOLIC BLOOD PRESSURE: 132 MMHG | BODY MASS INDEX: 31.1 KG/M2 | TEMPERATURE: 98 F | HEIGHT: 62 IN | HEART RATE: 58 BPM

## 2025-07-18 DIAGNOSIS — G44.209 TENSION HEADACHE: Primary | ICD-10-CM

## 2025-07-18 PROCEDURE — 96361 HYDRATE IV INFUSION ADD-ON: CPT

## 2025-07-18 PROCEDURE — 96375 TX/PRO/DX INJ NEW DRUG ADDON: CPT

## 2025-07-18 PROCEDURE — 96374 THER/PROPH/DIAG INJ IV PUSH: CPT

## 2025-07-18 RX ORDER — KETOROLAC TROMETHAMINE 15 MG/ML
15 INJECTION, SOLUTION INTRAMUSCULAR; INTRAVENOUS ONCE
Status: COMPLETED | OUTPATIENT
Start: 2025-07-18 | End: 2025-07-18

## 2025-07-18 RX ORDER — PROCHLORPERAZINE EDISYLATE 5 MG/ML
10 INJECTION INTRAMUSCULAR; INTRAVENOUS ONCE
Status: COMPLETED | OUTPATIENT
Start: 2025-07-18 | End: 2025-07-18

## 2025-07-18 RX ORDER — DEXAMETHASONE SODIUM PHOSPHATE 4 MG/ML
10 VIAL (ML) INJECTION ONCE
Status: COMPLETED | OUTPATIENT
Start: 2025-07-18 | End: 2025-07-18

## 2025-07-18 RX ORDER — DIPHENHYDRAMINE HYDROCHLORIDE 50 MG/ML
25 INJECTION, SOLUTION INTRAMUSCULAR; INTRAVENOUS ONCE
Status: COMPLETED | OUTPATIENT
Start: 2025-07-18 | End: 2025-07-18

## 2025-07-18 RX ORDER — ACETAMINOPHEN 500 MG
1000 TABLET ORAL EVERY 8 HOURS PRN
COMMUNITY

## 2025-07-18 NOTE — ED INITIAL ASSESSMENT (HPI)
Patient to the ED from home d/t headache. Patient states that she's had a 7/10 pain to her left temple for a few hours, BP elevated at home and states when her pressure gets high she gets a headache. Denies N/V. Denies blurred vision.

## 2025-07-18 NOTE — ED PROVIDER NOTES
[facility] Emergency Department Note  Patient: Emily Smith Age: 70 year old Sex: female      MRN: [id number]  : 3/30/1955    Patient Seen in: [facility] Emergency Department    History     Chief Complaint   Patient presents with    Headache     Stated Complaint: Headaches    History obtained from: Patient    Is a 70-year-old female with past medical history of hypertension, hyperlipidemia, colon and renal cell carcinoma currently in remission presenting today for evaluation of headache.  She states that over the past 3 hours, she has been having a throbbing pain in the left side of her head as well as a pressure sensation in a bandlike distribution across her forehead.  She denies any nausea, vomiting, numbness, tingling, weakness, slurred speech or vision changes.  She states that her headache is similar to her previous headaches that she occasionally gets.  She states that she checked her blood pressure and noted to be elevated with systolic blood pressures in the 180s prompting evaluation in the emergency department.  She states that she checks her blood pressure on a daily basis at home over the past several weeks and follows closely with her PCP for these fluctuating blood pressures.  She states that she recently had her blood pressure medications increased within the past 1 month.  Denies any chest pain or difficulty breathing.  Denies any neck pain or fevers.    Review of Systems:  Review of Systems  Positive for stated complaint: Headaches. Constitutional and vital signs reviewed. All other systems reviewed and negative except as noted above.    Patient History:  Past Medical History[1]    Past Surgical History[2]     Family History[3]    Specific Social Determinants of Health:   Short Social Hx on File[4]        PSFH elements reviewed from today and agreed except as otherwise stated in HPI.    Physical Exam     ED Triage Vitals [25 2311]   BP (!) 186/66   Pulse 63   Resp 18   Temp  97.9 °F (36.6 °C)   Temp src Temporal   SpO2 98 %   O2 Device None (Room air)       Current:/55   Pulse 58   Temp 97.9 °F (36.6 °C) (Temporal)   Resp 17   Ht 157.5 cm (5' 2\")   Wt 76.7 kg   SpO2 99%   BMI 30.91 kg/m²         Physical Exam  Constitutional:       General: She is not in acute distress.  HENT:      Head: Normocephalic and atraumatic.      Mouth/Throat:      Mouth: Mucous membranes are moist.   Eyes:      Extraocular Movements: Extraocular movements intact.   Cardiovascular:      Rate and Rhythm: Normal rate and regular rhythm.      Heart sounds: Normal heart sounds.   Pulmonary:      Effort: Pulmonary effort is normal. No respiratory distress.      Breath sounds: Normal breath sounds.   Abdominal:      Palpations: Abdomen is soft.      Tenderness: There is no abdominal tenderness.   Skin:     General: Skin is warm and dry.      Capillary Refill: Capillary refill takes less than 2 seconds.      Findings: No rash.   Neurological:      General: No focal deficit present.      Mental Status: She is alert and oriented to person, place, and time.      Cranial Nerves: No cranial nerve deficit.      Sensory: No sensory deficit.      Motor: No weakness.   Psychiatric:         Mood and Affect: Mood normal.         Behavior: Behavior normal.         ED Course   Labs:   Labs Reviewed   RAINBOW DRAW LAVENDER   RAINBOW DRAW LIGHT GREEN     Radiology findings:  I personally reviewed the images.   No results found.      Cardiac Monitor: Interpreted by me.   Pulse Readings from Last 1 Encounters:   07/18/25 58   , sinus,     External non-ED records reviewed independently by me: Note from 6/10/2025 reviewed confirming patient's metoprolol was recently increased to 100 mg daily    MDM   70-year-old female with a past medical history of hypertension, hyperlipidemia, colon and renal cell carcinoma currently in remission presenting today for evaluation of headache and elevated blood pressure readings.  Upon  arrival to emergency department, she is hypertensive otherwise hemodynamically stable.  No focal neurologic deficits on examination    Differential diagnoses considered includes, but is not limited to: Tension headache, migraine headache, hypertension, hypertensive emergency    Will obtain the following tests: None  Please see ED course for my independent review of these tests/imaging results.    Initial Medications/Therapeutics administered: migraine cocktail including NS bolus, IV Toradol, IV Decadron, IV Benadryl, IV Compazine    Chronic conditions affecting care: Hypertension, hyperlipidemia, colon and renal cell carcinoma    Workup and medications considered but not ordered: No indication for brain imaging at this time given no associated neurologic deficits and no red flags of headache (pain is not maximal at onset, not sudden onset, not most severe HA of patient's life, is not on immunosuppressive therapy, not immunocompromised, no meningismus, no fevers, no recent trauma)      Social Determinants of Health that impacted care: None     ED course: Upon reevaluation, patient's headache is resolved.  Blood pressure significantly improved following pain control.  Suspect hypertension likely secondary to her chronic hypertension versus reactive in setting of pain.  Discussed continued blood pressure monitoring at home with blood pressure diary and close PCP follow-up.  Return precautions were discussed and all questions answered.  Patient expressed understanding and agreement with plan    Disposition and Plan     Clinical Impression:  1. Tension headache        Disposition:  Discharge    Follow-up:  Rachel Mckinley DO  [address] [facility]  [address] [place] [address] [id number] [phone]    Schedule an appointment as soon as possible for a visit in 2 day(s)  for repeat BP check      Medications Prescribed:  Discharge Medication List as of 7/18/2025  3:01 AM            This note may have been created using voice dictation  technology and may include inadvertent errors.      Leanne Kendall MD  Emergency Medicine             [1]   Past Medical History:   Anesthesia complication    Cancer of sigmoid colon (HCC)    High blood pressure    High cholesterol    PONV (postoperative nausea and vomiting)    Renal cell carcinoma (HCC)    Visual impairment    glasses   [2]   Past Surgical History:  Procedure Laterality Date    Breast surgery  1998    Benign breast cyst    Colonoscopy N/A 08/10/2021    Procedure: COLONOSCOPY;  Surgeon: ROSALINA Mccray MD;  Location: [facility] ENDOSCOPY    Colonoscopy N/A 11/15/2021    Procedure: COLONOSCOPY;  Surgeon: Juan Mahan MD;  Location: [facility] ENDOSCOPY    Colonoscopy N/A 10/20/2022    Procedure: COLONOSCOPY;  Surgeon: Orlando Dumas MD;  Location: [facility] ENDO    Kidney surgery  11/23/2021    Tumor removed (Right side)    Laparoscopic cholecystectomy  03/07/2025    Laparoscopy, surgical; partial nephrectomy Right 11/23/2021    right robotic partial nephrectomy with intraoperative ultrasound, Dr. Villegas @ [facility]    Lumpectomy left      Monet localization wire 1 site left (cpt=19281)      1998    Tubal ligation  1990   [3]   Family History  Problem Relation Age of Onset    Colon Cancer Self     Renal Disease Self     Diabetes Mother     Hypertension Mother     Hypertension Father     Diabetes Brother     Diabetes Half-Sister     Diabetes Half-Sister     Cancer Maternal Cousin Female 50        breast cancer    Cancer Maternal Cousin Female         unknown cancer    Bleeding Disorders Neg     Sickle Cell Neg     DVT/VTE Neg    [4]   Social History  Socioeconomic History    Marital status:    Tobacco Use    Smoking status: Never    Smokeless tobacco: Never   Vaping Use    Vaping status: Never Used   Substance and Sexual Activity    Alcohol use: Never    Drug use: Never    Sexual activity: Yes     Partners: Male   Social History Narrative    Emily is  to Yesy x 46 yrs. Mother of 3 adult children. She works  part time in home health care. Patient lives with  and two of her daughters in [place], [place].     Social Drivers of Health     Food Insecurity: No Food Insecurity (6/10/2025)    NCSS - Food Insecurity     Worried About Running Out of Food in the Last Year: No     Ran Out of Food in the Last Year: No   Transportation Needs: No Transportation Needs (6/10/2025)    NCSS - Transportation     Lack of Transportation: No   Housing Stability: Not At Risk (6/10/2025)    NCSS - Housing/Utilities     Has Housing: Yes     Worried About Losing Housing: No     Unable to Get Utilities: No

## 2025-07-18 NOTE — PROGRESS NOTES
25 1402   ALISON Assessment   Assessment Type ALISON Initial   Assessment completed with Patient   Patient Subjective Spoke with patient for initial assessment Transitions of Care navigation call. Patient reports she is feeling better since emergency room discharge.  Patient reports her headache has resolved and when she checked her blood pressure today it was not as high as it was in the emergency room.  She was not home and couldn't provide blood pressure reading to nurse navigator.  Patient denies the following symptoms:  memory loss, confusion, slurred speech,  vision changes, or dizziness.  No medications prescribed at discharge. Remaining medications reviewed.  Patient does take tylenol 500 mg 2 tablets every 8 hours only as needed for headache. Patient has follow up appointment with primary care physician    on 25.   Patient was instructed to go to emergency room or call 911 if she begins experiencing:  (x)  Chest pain   (x)  Confusion/memory loss  (x)  Shortness of breath  (x)  Slurred speech  (x)  Vision changes   (x)  Weakness of upper/lower extremities   (x)  Severe headache     Patient did not have any additional questions or concerns.   Chief Complaint Tension headache   ALISON Navigation Initial Assessment   Verify patient name and  with patient/ caregiver Yes   Tell me what you understand of why you were in the hospital or emergency department Issues with my blood pressure and headaches   Prior to leaving the hospital were your Discharge Instructions reviewed with you? Yes   Did you receive a copy of your written Discharge Instructions? Yes   What questions do you have about your Discharge Instructions? Patient did not have any additional questions   Do you feel better or worse since you left the hospital or emergency department? Better   Do you have a follow-up appointment? No   Were you able to schedule the appt? Scheduled   Are there any barriers to getting to your follow-up appointment? No    Prior to leaving the hospital was Home Health (HH) arranged for you? N/A   Are HH needs identified by staff during the assessment? No   Prior to leaving the hospital or emergency department was Durable Medical Equipment (DME), medical supplies, or infusions arranged for you? N/A   Are DME/medical supply/infusions needs identified by staff during this assessment? No   Did any of your medications change, during or after your hospital stay or ED visit? No   Do you understand what your medications are for and possible side effects? Yes   Are there any reasons that keep you from taking your medication as prescribed? No   Any concerns about medication refills? No   Were you given a different diet per your Discharge Instructions? No   Reason n/a   Do you have any questions or concerns that have not been discussed? No

## 2025-07-18 NOTE — DISCHARGE INSTRUCTIONS
Thank you for seeking care at [facility] Emergency Department.  You have been seen and evaluated for a headache.    We reviewed the results from your visit in the emergency department.    Please read the instructions provided   If provided, take prescriptions as instructed.     Remember, your care process does not end after your visit today. Please follow-up with your doctor within 1-2 days for a follow-up check to ensure you are  improving, to see if you need any further evaluation/testing, or to evaluate for any alternate diagnoses.     Please return to the emergency department if you develop worsening of your headache or a new headache which is severe, associated with vision changes, difficulty with walking or coordination, difficulty with speech, numbness, tingling or weakness, associated with neck stiffness or fever, nausea or vomiting, if the headache is different from any other headache that you have had before, confusion, or if you develop any other new or concerning symptoms as these could be signs of more serious medical illness.    We hope you feel better.

## 2025-07-19 RX ORDER — POTASSIUM CHLORIDE 1500 MG/1
1 TABLET, EXTENDED RELEASE ORAL DAILY
Qty: 90 TABLET | Refills: 1 | Status: SHIPPED | OUTPATIENT
Start: 2025-07-19

## 2025-07-19 NOTE — TELEPHONE ENCOUNTER
Please Review. Protocol Failed; No Protocol     Requested Prescriptions   Pending Prescriptions Disp Refills    POTASSIUM CHLORIDE ER 20 MEQ Oral Tab CR [Pharmacy Med Name: POTASSIUM CHLORIDE 20MEQ ER TABLETS] 90 tablet 1     Sig: TAKE 1 TABLET BY MOUTH DAILY       There is no refill protocol information for this order

## 2025-07-22 ENCOUNTER — OFFICE VISIT (OUTPATIENT)
Facility: CLINIC | Age: 70
End: 2025-07-22
Payer: MEDICARE

## 2025-07-22 VITALS
WEIGHT: 170 LBS | HEIGHT: 62 IN | DIASTOLIC BLOOD PRESSURE: 60 MMHG | BODY MASS INDEX: 31.28 KG/M2 | OXYGEN SATURATION: 98 % | SYSTOLIC BLOOD PRESSURE: 152 MMHG | HEART RATE: 55 BPM

## 2025-07-22 DIAGNOSIS — I10 ESSENTIAL HYPERTENSION: Primary | ICD-10-CM

## 2025-07-22 DIAGNOSIS — G44.229 CHRONIC TENSION-TYPE HEADACHE, NOT INTRACTABLE: ICD-10-CM

## 2025-07-22 PROBLEM — N18.31 STAGE 3A CHRONIC KIDNEY DISEASE (HCC): Status: ACTIVE | Noted: 2025-07-22

## 2025-07-22 PROCEDURE — 3008F BODY MASS INDEX DOCD: CPT | Performed by: FAMILY MEDICINE

## 2025-07-22 PROCEDURE — 3078F DIAST BP <80 MM HG: CPT | Performed by: FAMILY MEDICINE

## 2025-07-22 PROCEDURE — 99214 OFFICE O/P EST MOD 30 MIN: CPT | Performed by: FAMILY MEDICINE

## 2025-07-22 PROCEDURE — 1111F DSCHRG MED/CURRENT MED MERGE: CPT | Performed by: FAMILY MEDICINE

## 2025-07-22 PROCEDURE — 1159F MED LIST DOCD IN RCRD: CPT | Performed by: FAMILY MEDICINE

## 2025-07-22 PROCEDURE — 1160F RVW MEDS BY RX/DR IN RCRD: CPT | Performed by: FAMILY MEDICINE

## 2025-07-22 PROCEDURE — 3077F SYST BP >= 140 MM HG: CPT | Performed by: FAMILY MEDICINE

## 2025-07-22 RX ORDER — METOPROLOL SUCCINATE 50 MG/1
50 TABLET, EXTENDED RELEASE ORAL EVERY EVENING
COMMUNITY
Start: 2025-07-22

## 2025-07-22 RX ORDER — LOSARTAN POTASSIUM 25 MG/1
25 TABLET ORAL DAILY
Qty: 90 TABLET | Refills: 0 | Status: SHIPPED | OUTPATIENT
Start: 2025-07-22

## 2025-07-22 NOTE — PROGRESS NOTES
CC:    Chief Complaint   Patient presents with    Hospital F/U       HPI: 70-year-old female here for ER follow-up due to headaches.  Seen in the ER on 7/18 due to headaches and elevated blood pressure.  Was treated with a headache cocktail and sent home to follow-up with me.  Did have a CT on 7/8 that was negative for acute changes but showed possible empty sella.  Her blood pressure has been 140-160/50-60 when she checks at home, and did get a new cuff.  Her diastolic is always low, but the systolic high.  Headaches have been better since her ER visit, and had to take Tylenol only once.  The headaches feel like a tightness around her head, and Tylenol does help.   Headaches were occurring daily prior to this for several weeks.   Trying to walk more.   Does not add salt to food, and plans to cut out meat.   Does not drink caffeine or alcohol.     ROS:  General:  No fever, no fatigue, no weight changes  HEENT:  Denies congestion or nasal discharge, no vision changes   Cardio:  No chest pain  Pulmonary:  No cough, no SOB  GI:  No N/V/D  :  No discharge, no dysuria, no polyuria, no hematuria  Dermatologic:  No rashes  Neuro: improved headaches, no dizziness, no weakness     Past Medical History[1]    Social History     Socioeconomic History    Marital status:      Spouse name: Not on file    Number of children: Not on file    Years of education: Not on file    Highest education level: Not on file   Occupational History    Not on file   Tobacco Use    Smoking status: Never    Smokeless tobacco: Never   Vaping Use    Vaping status: Never Used   Substance and Sexual Activity    Alcohol use: Never    Drug use: Never    Sexual activity: Yes     Partners: Male   Other Topics Concern    Caffeine Concern Not Asked    Exercise Not Asked    Seat Belt Not Asked    Special Diet Not Asked    Stress Concern Not Asked    Weight Concern Not Asked   Social History Narrative    Emily is  to Yesy x 46 yrs. Mother of 3  adult children. She works part time in home health care. Patient lives with  and two of her daughters in [place], [place].     Social Drivers of Health     Food Insecurity: No Food Insecurity (7/22/2025)    NCSS - Food Insecurity     Worried About Running Out of Food in the Last Year: No     Ran Out of Food in the Last Year: No   Transportation Needs: No Transportation Needs (7/22/2025)    NCSS - Transportation     Lack of Transportation: No   Stress: Not on file   Housing Stability: Not At Risk (7/22/2025)    NCSS - Housing/Utilities     Has Housing: Yes     Worried About Losing Housing: No     Unable to Get Utilities: No       Current Medications[2]    Lisinopril, Shrimp, and Radiology contrast iodinated dyes      Vitals:   Vitals:    07/22/25 1319 07/22/25 1341   BP: 150/64 152/60   Pulse: 55    SpO2: 98%    Weight: 170 lb (77.1 kg)    Height: 5' 2\" (1.575 m)        Body mass index is 31.09 kg/m².    Physical:  General:  Alert, appropriate, no acute distress   HEENT: supple, no tonsillar erythema or exudate, no lymphadenopathy   Cardio:  RRR, no murmurs, S1, S2  Dermatologic:  No rashes or lesions  Neuro: CN II-XII intact, 5/5 muscle strength bilateral upper and lower extremities, normal cerebellar testing, no focal neurologic deficits   Ext: no cyanosis or edema     Assessment and Plan: 70-year-old female here for ER follow-up due to persistent hypertension and headaches.    1. Essential hypertension    - Blood pressure remains elevated, and given persistent but improved headaches despite increasing metoprolol dose we will adjust blood pressure medication  - Decrease metoprolol to 50 mg nightly with Amlodipine and will add losartan 25 mg in the morning with hydrochlorothiazide  - Patient did have angioedema with lisinopril, but explained that this reaction to ARB's is far less common  - Nevertheless gave her strict warning signs and ED precautions and notify me if any concerns with new medication  - Will  continue to monitor her blood pressure at home and notify me of her readings in the next few weeks  - If still elevated plan to increase losartan further    2. Chronic tension-type headache, not intractable    - Likely related to difficult to control blood pressure, and improved since ER visit  - Normal neurologic exam and note unremarkable CT head as well, but if headaches persist or worsen despite controlling blood pressure will plan MRI      Rachel MckinleyDO  07/22/25  1:24 PM         [1]   Past Medical History:   Anesthesia complication    Cancer of sigmoid colon (HCC)    High blood pressure    High cholesterol    PONV (postoperative nausea and vomiting)    Renal cell carcinoma (HCC)    Visual impairment    glasses   [2]   Current Outpatient Medications   Medication Sig Dispense Refill    Potassium Chloride ER 20 MEQ Oral Tab CR Take 1 tablet by mouth daily. 90 tablet 1    acetaminophen 500 MG Oral Tab Take 2 tablets (1,000 mg total) by mouth every 8 (eight) hours as needed for Pain (headache).      metoprolol succinate  MG Oral Tablet 24 Hr Take 1 tablet (100 mg total) by mouth every evening. 90 tablet 0    pravastatin 40 MG Oral Tab Take 1 tablet (40 mg total) by mouth nightly. 90 tablet 3    hydroCHLOROthiazide 25 MG Oral Tab Take 1 tablet (25 mg total) by mouth daily. 90 tablet 3    lidocaine 5 % External Patch Place 1 patch onto the skin as needed in the morning (pain on left thigh).      amLODIPine 10 MG Oral Tab Take 1 tablet (10 mg total) by mouth nightly. 90 tablet 2

## (undated) DIAGNOSIS — D12.5 BENIGN NEOPLASM OF SIGMOID COLON: Primary | ICD-10-CM

## (undated) DIAGNOSIS — D12.5 ADENOMATOUS POLYP OF SIGMOID COLON: Primary | ICD-10-CM

## (undated) DEVICE — 3M™ TEGADERM™ HP TRANSPARENT FILM DRESSING FRAME STYLE, 9534HP, 2-3/8 X 2-3/4 IN (6 CM X 7 CM), 100/CT 4CT/CASE: Brand: 3M™ TEGADERM™

## (undated) DEVICE — CATHETER CHOLGM 4.5FR L18IN W/ MTL SUPP TB

## (undated) DEVICE — TROCAR: Brand: KII® SLEEVE

## (undated) DEVICE — APPLCTR ENDO FLOSEAL DISP

## (undated) DEVICE — LAPAROVUE VISIBILITY SYSTEM LAPAROSCOPIC SOLUTIONS: Brand: LAPAROVUE

## (undated) DEVICE — SOLO FLEX HYBRID GUIDEWIRE .03

## (undated) DEVICE — SUCTION CANISTER, 3000CC,SAFELINER: Brand: DEROYAL

## (undated) DEVICE — SUTURE SILK 2-0 SA85H

## (undated) DEVICE — 35 ML SYRINGE REGULAR TIP: Brand: MONOJECT

## (undated) DEVICE — Device: Brand: CUSTOM PROCEDURE KIT

## (undated) DEVICE — E-Z CLEAN, PTFE COATED, ELECTROSURGICAL LAPAROSCOPIC ELECTRODE, L-HOOK, 33 CM., SINGLE-USE, FOR USE WITH HAND CONTROL PENCIL: Brand: MEGADYNE

## (undated) DEVICE — SUTURE PDS II 0 CTX

## (undated) DEVICE — UNDYED BRAIDED (POLYGLACTIN 910), SYNTHETIC ABSORBABLE SUTURE: Brand: COATED VICRYL

## (undated) DEVICE — DRESSING BIOPATCH 1X4 CNTR

## (undated) DEVICE — LARGE NEEDLE DRIVER: Brand: ENDOWRIST

## (undated) DEVICE — DRAPE SHEET LAPCHOLE 124X100X7

## (undated) DEVICE — MEDI-VAC NON-CONDUCTIVE SUCTION TUBING: Brand: CARDINAL HEALTH

## (undated) DEVICE — [HIGH FLOW INSUFFLATOR,  DO NOT USE IF PACKAGE IS DAMAGED,  KEEP DRY,  KEEP AWAY FROM SUNLIGHT,  PROTECT FROM HEAT AND RADIOACTIVE SOURCES.]: Brand: PNEUMOSURE

## (undated) DEVICE — SYRINGE MED 10ML LL CTRL W/ FNGR GRP CLR BRL

## (undated) DEVICE — ADHESIVE SKIN TOP FOR WND CLSR DERMBND ADV

## (undated) DEVICE — PURSE STRING DEVICE: Brand: PURSTRING

## (undated) DEVICE — CANNULA SEAL

## (undated) DEVICE — ORISE GEL

## (undated) DEVICE — CAUTERY BLADE 2IN INS E1455

## (undated) DEVICE — PDS II VLT 0 107CM AG ST3: Brand: ENDOLOOP

## (undated) DEVICE — SOLUTION IV 1000ML 0.9% NACL PRESERVATIVE

## (undated) DEVICE — SUTURE VICRYL 0

## (undated) DEVICE — SUTURE PDS II 4-0 SH

## (undated) DEVICE — ELECTRO LUBE IS A SINGLE PATIENT USE DEVICE THAT IS INTENDED TO BE USED ON ELECTROSURGICAL ELECTRODES TO REDUCE STICKING.: Brand: KEY SURGICAL ELECTRO LUBE

## (undated) DEVICE — VIOLET POLYDIOXANONE POLYMER, SYNTHETIC ABSORBABLE SUTURE CLIPS: Brand: LAPRA-TY

## (undated) DEVICE — BLAKE SILICONE DRAIN, 15 FR ROUND, HUBLESS: Brand: BLAKE

## (undated) DEVICE — CLIP HEMOLOK LARGE PURPLE

## (undated) DEVICE — SNARE ENDOSCOPIC 10MM ROUND

## (undated) DEVICE — MEDI-VAC NON-CONDUCTIVE SUCTION TUBING 6MM X 1.8M (6FT.) L: Brand: CARDINAL HEALTH

## (undated) DEVICE — BLADELESS OBTURATOR: Brand: WECK VISTA

## (undated) DEVICE — TROCAR: Brand: KII FIOS FIRST ENTRY

## (undated) DEVICE — SUTURE VLOC 180 3-0 6\" 0604

## (undated) DEVICE — VISUALIZATION SYSTEM: Brand: CLEARIFY

## (undated) DEVICE — LAP CHOLE: Brand: MEDLINE INDUSTRIES, INC.

## (undated) DEVICE — SUTURE SILK 3-0 SH

## (undated) DEVICE — ABSORBABLE HEMOSTAT (OXIDIZED REGENERATED CELLULOSE, U.S.P.): Brand: SURGICEL

## (undated) DEVICE — SYRINGE MED 20ML STD CLR PLAS LL TIP N CTRL

## (undated) DEVICE — KIT ENDO ORCAPOD 160/180/190

## (undated) DEVICE — TISSUE RETRIEVAL SYSTEM: Brand: INZII RETRIEVAL SYSTEM

## (undated) DEVICE — ABC PROBE 10 MM FOOTSWITCHING PROBE: Brand: ABC

## (undated) DEVICE — SUTURE VICRYL 0 UR-6

## (undated) DEVICE — SUTURE SILK 2-0 FS

## (undated) DEVICE — [URETERAL KIT: 2 - URETERAL CATHETERS, 6 FR OUTER DIAMETER, 70 CM LENGTH,  2 - EMITTING FIBER, 0.75 OUTER DIAMETER, 370 CM LENGTH,  DO NOT USE IF PACKAGE IS DAMAGED]: Brand: IRIS

## (undated) DEVICE — COLUMN DRAPE

## (undated) DEVICE — GLOVE SUR 8.5 SENSICARE NEOPR PWD F

## (undated) DEVICE — SOLUTION IRRIG 1000ML 0.9% NACL USP BTL

## (undated) DEVICE — APPLICATOR ENDOSCP FOR ADJUNCTIVE HEMSTAS

## (undated) DEVICE — CLIP LGT 11MM OPEN 2.8MM 235CM

## (undated) DEVICE — DISPOSABLE SUCTION/IRRIGATOR TUBE SET: Brand: AHTO

## (undated) DEVICE — Device

## (undated) DEVICE — 20 ML SYRINGE LUER-LOCK TIP: Brand: MONOJECT

## (undated) DEVICE — PENCIL ESURG 10FT 3/32IN SS

## (undated) DEVICE — FLOSEAL HEMOSTATIC MATRIX, 5ML: Brand: FLOSEAL HEMOSTATIC MATRIX

## (undated) DEVICE — STERILE LATEX POWDER-FREE SURGICAL GLOVESWITH NITRILE COATING: Brand: PROTEXIS

## (undated) DEVICE — DALE FOLEY CATHETER HOLDER, LEGBAND, FITS UP TO 30": Brand: DALE FOLEY CATHETER HOLDER

## (undated) DEVICE — HEXAGONAL CAPTIVATOR STIFF 13M

## (undated) DEVICE — Device: Brand: SPOT EX ENDOSCOPIC TATTOO

## (undated) DEVICE — KIT CLEAN ENDOKIT 1.1OZ GOWNX2

## (undated) DEVICE — SUTURE VICRYL 3-0 SH

## (undated) DEVICE — ABC PROBE 5 MM HANDSWITCHING PROBE, 44 CM: Brand: ABC

## (undated) DEVICE — INSUFFLATION NEEDLE TO ESTABLISH PNEUMOPERITONEUM.: Brand: INSUFFLATION NEEDLE

## (undated) DEVICE — STERILE SURGICAL LUBRICANT, METAL TUBE: Brand: SURGILUBE

## (undated) DEVICE — SUTURE VICRYL 0 CT-1

## (undated) DEVICE — SUT MCRYL 4-0 18IN PS-2 ABSRB UD 19MM 3/8 CIR

## (undated) DEVICE — TIP COVER ACCESSORY

## (undated) DEVICE — GAMMEX® PI HYBRID SIZE 7.5, STERILE POWDER-FREE SURGICAL GLOVE, POLYISOPRENE AND NEOPRENE BLEND: Brand: GAMMEX

## (undated) DEVICE — MONOPOLAR CURVED SCISSORS: Brand: ENDOWRIST

## (undated) DEVICE — 3M™ STERI-DRAPE™ PATIENT ISOLATION DRAPE 1014: Brand: STERI-DRAPE™

## (undated) DEVICE — MASK PROC W/VISOR ANTIGLARE

## (undated) DEVICE — SUT COAT VCRL+ 0 27IN UR-5 ABSRB VLT ANTIBACT

## (undated) DEVICE — ROBOTIC: Brand: MEDLINE INDUSTRIES, INC.

## (undated) DEVICE — SOL  .9 3000ML

## (undated) DEVICE — STAPLER CIRCULAR ENDO 29MM

## (undated) DEVICE — BIPOLAR SEALER 23-301-1 AQM MBS: Brand: AQUAMANTYS™

## (undated) DEVICE — SUTURE VICRYL 3-0 RB-1

## (undated) DEVICE — LINE MNTR ADLT SET O2 INTMD

## (undated) DEVICE — SNARE OPTMZ PLPCTM TRP

## (undated) DEVICE — DRAPE SRG 131X112X63IN GYN URO

## (undated) DEVICE — COVER SGL STRL LGHT HNDL BLU

## (undated) DEVICE — ANCHOR TISSUE RETRIEVAL SYSTEM, BAG SIZE 175 ML, PORT SIZE 10 MM: Brand: ANCHOR TISSUE RETRIEVAL SYSTEM

## (undated) DEVICE — FORCEP RADIAL JAW 4

## (undated) DEVICE — CATH IV SFTY 14GA X 2IN WNG FEP INTROCAN

## (undated) DEVICE — CYSTO PACK: Brand: MEDLINE INDUSTRIES, INC.

## (undated) DEVICE — POWDER HEMSTAT 3GM OXIDIZED REGENERATED CELOS

## (undated) DEVICE — SOL  .9 1000ML BTL

## (undated) DEVICE — VASCULAR LOOPS, STERILE, RED, MAXI, 30" LONG, 2/PKG: Brand: KEY SURGICAL VASCULAR LOOPS

## (undated) DEVICE — LUBRICANT JLY EZ 4OZ BCTRST H2

## (undated) DEVICE — LUBRICANT JLY SURGILUBE 2OZ

## (undated) DEVICE — SUTURE MONOCRYL 4-0 PS-2

## (undated) DEVICE — GOWN SURGICAL MICROCOOL XL LNG

## (undated) DEVICE — AIRSEAL 12 MM ACCESS PORT AND PALM GRIP OBTURATOR WITH BLADELESS OPTICAL TIP, 120 MM LENGTH: Brand: AIRSEAL

## (undated) DEVICE — EXOFIN TISSUE ADHESIVE 1.0ML

## (undated) DEVICE — DEV CLOS VLOC 2/0 V 9 V-30

## (undated) DEVICE — ENSEAL X1 TISSUE SEALER, CURVED JAW, 37 CM SHAFT LENGTH: Brand: ENSEAL

## (undated) DEVICE — PROGRASP FORCEPS: Brand: ENDOWRIST

## (undated) DEVICE — ECHELON FLEX POWERED PLUS ARTICULATING ENDOSCOPIC LINEAR CUTTER , 60MM: Brand: ECHELON FLEX

## (undated) DEVICE — LAPAROSCOPIC ACCESS SYSTEM: Brand: ALEXIS LAPAROSCOPIC SYSTEM WITH KII FIOS FIRST ENTRY

## (undated) DEVICE — DRAIN RESERVOIR RELIAVAC 100CC

## (undated) DEVICE — ARM DRAPE

## (undated) DEVICE — DISPOSABLE GRASPER CARTRIDGE: Brand: DIRECT DRIVE REPOSABLE GRASPERS

## (undated) DEVICE — ADAPTER URETERAL CATH CONCT

## (undated) DEVICE — SUTURE VLOC 90 2-0 9\" 2145

## (undated) DEVICE — TRI-LUMEN FILTERED TUBE SET WITH ACTIVATED CHARCOAL FILTER: Brand: AIRSEAL

## (undated) DEVICE — ENDOPATH ECHELON ENDOSCOPIC LINEAR CUTTER RELOADS, BLUE, 60MM: Brand: ECHELON ENDOPATH

## (undated) DEVICE — ENCORE® LATEX MICRO SIZE 8.5, STERILE LATEX POWDER-FREE SURGICAL GLOVE: Brand: ENCORE

## (undated) DEVICE — SOL  .9 1000ML BAG

## (undated) DEVICE — SUT COAT VCRL + 0 54IN ABSRB UD ANTIBACT

## (undated) DEVICE — GAMMEX® PI HYBRID SIZE 8, STERILE POWDER-FREE SURGICAL GLOVE, POLYISOPRENE AND NEOPRENE BLEND: Brand: GAMMEX

## (undated) DEVICE — SUTURE PROLENE 4-0 RB-1

## (undated) DEVICE — NEEDLE CONTRAST INTERJECT 25G

## (undated) DEVICE — TOWEL SURG OR 17X30IN BLUE

## (undated) DEVICE — MARYLAND BIPOLAR FORCEPS: Brand: ENDOWRIST

## (undated) DEVICE — SUBMUCOSAL LIFTING AGENT WITH NEEDLE.: Brand: ORISE™ GEL

## (undated) DEVICE — CAP SEALING, REVEAL 13.4MM

## (undated) DEVICE — MEGADYNE E-Z CLEAN BLADE 2.75"

## (undated) NOTE — LETTER
[address] [place] [address]      Authorization for Surgical Operation and Procedure     Date:___________                                                                                                         Time:__________  1. I hereby authorize Roderick Villegas MD, my physician and his/her assistants (if applicable), which may include medical students, residents, and/or fellows, to perform the following surgical operation/ procedure and administer such anesthesia as may be determined necessary by my physician:  Operation/Procedure name (s)  INSERTION OF  LEFT LIGHTED URETERAL PLACEMENT   on [facility]   2. I recognize that during the surgical operation/procedure, unforeseen conditions may necessitate additional or different procedures than those listed above. I, therefore, further authorize and request that the above-named surgeon, assistants, or designees perform such procedures as are, in their judgment, necessary and desirable. 3.   My surgeon/physician has discussed prior to my surgery the potential benefits, risks and side effects of this procedure; the likelihood of achieving goals; and potential problems that might occur during recuperation. They also discussed reasonable alternatives to the procedure, including risks, benefits, and side effects related to the alternatives and risks related to not receiving this procedure. I have had all my questions answered and I acknowledge that no guarantee has been made as to the result that may be obtained. 4.   Should the need arise during my operation or immediate post-operative period, I also consent to the administration of blood and/or blood products. Further, I understand that despite careful testing and screening of blood or blood products by collecting agencies, I may still be subject to ill effects as a result of receiving a blood transfusion and/or blood products.   The following are some, but not all, of the potential risks that can occur: fever and allergic reactions, hemolytic reactions, transmission of diseases such as Hepatitis, AIDS and Cytomegalovirus (CMV) and fluid overload. In the event that I wish to have an autologous transfusion of my own blood, or a directed donor transfusion. I will discuss this with my physician. 5.   I authorize the use of any specimen, organs, tissues, body parts or foreign objects that may be removed from my body during the operation/procedure for diagnosis, research or teaching purposes and their subsequent disposal by hospital authorities. I also authorize the release of specimen test results and/or written reports to my treating physician on the hospital medical staff or other referring or consulting physicians involved in my care, at the discretion of the Pathologist or my treating physician. 6.   I consent to the photographing or videotaping of the operations or procedures to be performed, including appropriate portions of my body for medical, scientific, or educational purposes, provided my identity is not revealed by the pictures or by descriptive texts accompanying them. If the procedure has been photographed/videotaped, the surgeon will obtain the original picture, image, videotape or CD. The hospital will not be responsible for storage, release or maintenance of the picture, image, tape or CD.    7.   I consent to the presence of a  or observers in the operating room as deemed necessary by my physician or their designees. 8.   I recognize that in the event my procedure results in extended X-Ray/fluoroscopy time, I may develop a skin reaction. 9. If I have a Do Not Attempt Resuscitation (DNAR) order in place, that status will be suspended while in the operating room, procedural suite, and during the recovery period unless otherwise explicitly stated by me (or a person authorized to consent on my behalf).  The surgeon or my attending physician will determine when the applicable recovery period ends for purposes of reinstating the DNAR order. 10. Patients having a sterilization procedure: I understand that if the procedure is successful the results will be permanent and it will therefore be impossible for me to inseminate, conceive, or bear children. I also understand that the procedure is intended to result in sterility, although the result has not been guaranteed. 11. I acknowledge that my physician has explained sedation/analgesia administration to me including the risk and benefits I consent to the administration of sedation/analgesia as may be necessary or desirable in the judgment of my physician. I CERTIFY THAT I HAVE READ AND FULLY UNDERSTAND THE ABOVE CONSENT TO OPERATION and/or OTHER PROCEDURE.    _________________________________________  __________________________________  Signature of Patient     Signature of Responsible Person         ___________________________________         Printed Name of Responsible Person           _________________________________                  Relationship to Patient  _________________________________________  ______________________________  Signature of Witness          Date  Time    STATEMENT OF PHYSICIAN My signature below affirms that prior to the time of the procedure; I have explained to the patient and/or his/her legal representative, the risks and benefits involved in the proposed treatment and any reasonable alternative to the proposed treatment. I have also explained the risks and benefits involved in refusal of the proposed treatment and alternatives to the proposed treatment and have answered the patient's questions. If I have a significant financial interest in a co-management agreement or a significant financial interest in any product or implant, or other significant relationship used in this procedure/surgery, I have disclosed this and had a discussion with my patient. _______________________________________________________________ _____________________________  Tara Marino of Physician)                                                                                         (Date)                                   (Time)        Patient Name: Rosalind Greene    : 3/30/1955   Printed: 3/21/2022      Medical Record #: [id number]                                              Page 1 of 1

## (undated) NOTE — LETTER
Vikki Daugherty Do  [facility]  [facility]  [place]       10/05/21        Patient: Abby Number   YOB: 1955   Date of Visit: 10/5/2021       Dear  Dr. Blanca Enriquez DO,      Thank you for referring Meghna Valles nocturia x3. She is able to fall back asleep with ease and does not feel this is an issue. The patient is not currently taking any medication for this. She feels this is stable and chooses to continue observation.  Patient will submit a urine specimen for U MD

## (undated) NOTE — LETTER
[address] [place], [place]      Authorization for Surgical Operation and Procedure     Date:___________                                                                                                         Time:__________  1. I hereby Ten Murray MD, Jay Jay Calvo MD, my physician and his/her assistants (if applicable), which may include medical students, residents, and/or fellows, to perform the following surgical operation/ procedure and administer such anesthesia as may be determined necessary by my physician:  Operation/Procedure name (s) LAPAROSCOPIC SIGMOIDECTOMY, SPLENIC FLEXURE MOBILIZATION,   LEFT LIGHTED URETERAL STENT PLACEMENT,  on [facility]   2. I recognize that during the surgical operation/procedure, unforeseen conditions may necessitate additional or different procedures than those listed above. I, therefore, further authorize and request that the above-named surgeon, assistants, or designees perform such procedures as are, in their judgment, necessary and desirable. 3.   My surgeon/physician has discussed prior to my surgery the potential benefits, risks and side effects of this procedure; the likelihood of achieving goals; and potential problems that might occur during recuperation. They also discussed reasonable alternatives to the procedure, including risks, benefits, and side effects related to the alternatives and risks related to not receiving this procedure. I have had all my questions answered and I acknowledge that no guarantee has been made as to the result that may be obtained. 4.   Should the need arise during my operation or immediate post-operative period, I also consent to the administration of blood and/or blood products.   Further, I understand that despite careful testing and screening of blood or blood products by collecting agencies, I may still be subject to ill effects as a result of receiving a blood transfusion and/or blood products. The following are some, but not all, of the potential risks that can occur: fever and allergic reactions, hemolytic reactions, transmission of diseases such as Hepatitis, AIDS and Cytomegalovirus (CMV) and fluid overload. In the event that I wish to have an autologous transfusion of my own blood, or a directed donor transfusion. I will discuss this with my physician. 5.   I authorize the use of any specimen, organs, tissues, body parts or foreign objects that may be removed from my body during the operation/procedure for diagnosis, research or teaching purposes and their subsequent disposal by hospital authorities. I also authorize the release of specimen test results and/or written reports to my treating physician on the hospital medical staff or other referring or consulting physicians involved in my care, at the discretion of the Pathologist or my treating physician. 6.   I consent to the photographing or videotaping of the operations or procedures to be performed, including appropriate portions of my body for medical, scientific, or educational purposes, provided my identity is not revealed by the pictures or by descriptive texts accompanying them. If the procedure has been photographed/videotaped, the surgeon will obtain the original picture, image, videotape or CD. The hospital will not be responsible for storage, release or maintenance of the picture, image, tape or CD.    7.   I consent to the presence of a  or observers in the operating room as deemed necessary by my physician or their designees. 8.   I recognize that in the event my procedure results in extended X-Ray/fluoroscopy time, I may develop a skin reaction. 9.  If I have a Do Not Attempt Resuscitation (DNAR) order in place, that status will be suspended while in the operating room, procedural suite, and during the recovery period unless otherwise explicitly stated by me (or a person authorized to consent on my behalf). The surgeon or my attending physician will determine when the applicable recovery period ends for purposes of reinstating the DNAR order. 10. Patients having a sterilization procedure: I understand that if the procedure is successful the results will be permanent and it will therefore be impossible for me to inseminate, conceive, or bear children. I also understand that the procedure is intended to result in sterility, although the result has not been guaranteed. 11. I acknowledge that my physician has explained sedation/analgesia administration to me including the risk and benefits I consent to the administration of sedation/analgesia as may be necessary or desirable in the judgment of my physician. I CERTIFY THAT I HAVE READ AND FULLY UNDERSTAND THE ABOVE CONSENT TO OPERATION and/or OTHER PROCEDURE.    _________________________________________  __________________________________  Signature of Patient     Signature of Responsible Person         ___________________________________         Printed Name of Responsible Person           _________________________________                  Relationship to Patient  _________________________________________  ______________________________  Signature of Witness          Date  Time    STATEMENT OF PHYSICIAN My signature below affirms that prior to the time of the procedure; I have explained to the patient and/or his/her legal representative, the risks and benefits involved in the proposed treatment and any reasonable alternative to the proposed treatment. I have also explained the risks and benefits involved in refusal of the proposed treatment and alternatives to the proposed treatment and have answered the patient's questions.  If I have a significant financial interest in a co-management agreement or a significant financial interest in any product or implant, or other significant relationship used in this procedure/surgery, I have disclosed this and had a discussion with my patient.     _______________________________________________________________ _____________________________  Fe Alvarez)                                                                                         (Date)                                   (Time)        Patient Name: Jamison Logn    : 3/30/1955   Printed: 3/22/2022      Medical Record #: [id number]                                              Page 1 of 1

## (undated) NOTE — LETTER
[address], [place]  Authorization for Invasive Procedures  1.  I hereby authorize Dr. Live Smalls , my physician and whomever may be designated as the doctor's assistant, to perform the following operation and/or procedure:  [place] of the potential risks that can occur: fever and allergic reactions, hemolytic reactions, transmission of disease such as hepatitis, AIDS, cytomegalovirus (CMV), and flluid overload.  In the event that I wish to have autologous transfusions of my own blood, the judgment of my physician.      Signature of Patient:  ________________________________________________ Date: _________Time: _________    Responsible person in case of minor or unconscious: _____________________________Relationship: ____________     Allison Bills

## (undated) NOTE — LETTER
Hospital Discharge Documentation  Please phone to schedule a hospital follow up appointment.     From: [address] [facility] Office  Phone: [phone]    Patient discharged time/date: 11/25/2021  4:03 PM  Patient discharge disposition:  Home or Peggy Musculoskeletal: Moves all extremities. Hospital Course:   Right renal mass  S/P RIGHT XI ROBOTIC ASSISTED LAPAROSCOPIC PARTIAL NEPHRECTOMY POD 1 INTRAOPERATIVE ULTRASOUND, PAIN CONTROL, MONITOR HEMODYNAMIC STATUS, DVT PROPHYLAXIS, PATHOLOGY PENDING. Stop taking on: December 8, 2021  Quantity: 28 tablet  Refills: 0        CHANGE how you take these medications      Instructions Prescription details   amLODIPine 10 MG Tabs  Commonly known as: NORVASC  What changed:   · how much to take  · how to take [facility] You should take this medication with food. Do not drive or operate machinery while taking this medication. This medication can cause constipation so you should be taking a stool softener (prescribed to your pharmacy) to avoid constipation.   Keep in mind th diarrhea. Increased pain not relieved by Racquel Hacking smelling drainage and/or redness to your incisions. If you have chest pain, calf pain/swelling, or shortness of breath please go to the emergency room.     We wish you a speedy, safe, and uneventful re

## (undated) NOTE — LETTER
[address] [place], [place]      Authorization for Surgical Operation and Procedure     Date:___________                                                                                                         Time:__________  1. I hereby Lina Sutherland MD, my physician and his/her assistants (if applicable), which may include medical students, residents, and/or fellows, to perform the following surgical operation/ procedure and administer such anesthesia as may be determined necessary by my physician:  Operation/Procedure name (s) LAPAROSCOPIC SIGMOIDECTOMY, [address]  on [facility]   2. I recognize that during the surgical operation/procedure, unforeseen conditions may necessitate additional or different procedures than those listed above. I, therefore, further authorize and request that the above-named surgeon, assistants, or designees perform such procedures as are, in their judgment, necessary and desirable. 3.   My surgeon/physician has discussed prior to my surgery the potential benefits, risks and side effects of this procedure; the likelihood of achieving goals; and potential problems that might occur during recuperation. They also discussed reasonable alternatives to the procedure, including risks, benefits, and side effects related to the alternatives and risks related to not receiving this procedure. I have had all my questions answered and I acknowledge that no guarantee has been made as to the result that may be obtained. 4.   Should the need arise during my operation or immediate post-operative period, I also consent to the administration of blood and/or blood products. Further, I understand that despite careful testing and screening of blood or blood products by collecting agencies, I may still be subject to ill effects as a result of receiving a blood transfusion and/or blood products.   The following are some, but not all, of the potential risks that can occur: fever and allergic reactions, hemolytic reactions, transmission of diseases such as Hepatitis, AIDS and Cytomegalovirus (CMV) and fluid overload. In the event that I wish to have an autologous transfusion of my own blood, or a directed donor transfusion. I will discuss this with my physician. 5.   I authorize the use of any specimen, organs, tissues, body parts or foreign objects that may be removed from my body during the operation/procedure for diagnosis, research or teaching purposes and their subsequent disposal by hospital authorities. I also authorize the release of specimen test results and/or written reports to my treating physician on the hospital medical staff or other referring or consulting physicians involved in my care, at the discretion of the Pathologist or my treating physician. 6.   I consent to the photographing or videotaping of the operations or procedures to be performed, including appropriate portions of my body for medical, scientific, or educational purposes, provided my identity is not revealed by the pictures or by descriptive texts accompanying them. If the procedure has been photographed/videotaped, the surgeon will obtain the original picture, image, videotape or CD. The hospital will not be responsible for storage, release or maintenance of the picture, image, tape or CD.    7.   I consent to the presence of a  or observers in the operating room as deemed necessary by my physician or their designees. 8.   I recognize that in the event my procedure results in extended X-Ray/fluoroscopy time, I may develop a skin reaction. 9. If I have a Do Not Attempt Resuscitation (DNAR) order in place, that status will be suspended while in the operating room, procedural suite, and during the recovery period unless otherwise explicitly stated by me (or a person authorized to consent on my behalf).  The surgeon or my attending physician will determine when the applicable recovery period ends for purposes of reinstating the DNAR order. 10. Patients having a sterilization procedure: I understand that if the procedure is successful the results will be permanent and it will therefore be impossible for me to inseminate, conceive, or bear children. I also understand that the procedure is intended to result in sterility, although the result has not been guaranteed. 11. I acknowledge that my physician has explained sedation/analgesia administration to me including the risk and benefits I consent to the administration of sedation/analgesia as may be necessary or desirable in the judgment of my physician. I CERTIFY THAT I HAVE READ AND FULLY UNDERSTAND THE ABOVE CONSENT TO OPERATION and/or OTHER PROCEDURE.    _________________________________________  __________________________________  Signature of Patient     Signature of Responsible Person         ___________________________________         Printed Name of Responsible Person           _________________________________                  Relationship to Patient  _________________________________________  ______________________________  Signature of Witness          Date  Time    STATEMENT OF PHYSICIAN My signature below affirms that prior to the time of the procedure; I have explained to the patient and/or his/her legal representative, the risks and benefits involved in the proposed treatment and any reasonable alternative to the proposed treatment. I have also explained the risks and benefits involved in refusal of the proposed treatment and alternatives to the proposed treatment and have answered the patient's questions. If I have a significant financial interest in a co-management agreement or a significant financial interest in any product or implant, or other significant relationship used in this procedure/surgery, I have disclosed this and had a discussion with my patient. _______________________________________________________________ _____________________________  Avila Perez Physician)                                                                                         (Date)                                   (Time)        Patient Name: Milad Tinoco    : 3/30/1955   Printed: 3/21/2022      Medical Record #: [id number]                                              Page 1 of 1

## (undated) NOTE — LETTER
[address] [place] [address]      Authorization for Surgical Operation and Procedure     Date:___________                                                                                                         Time:__________  1. I hereby Naresh Salazar MD, my physician and his/her assistants (if applicable), which may include medical students, residents, and/or fellows, to perform the following surgical operation/ procedure and administer such anesthesia as may be determined necessary by my physician:  Operation/Procedure name (s)  INSERTION OF  LEFT LIGHTED URETERAL PLACEMENT   on [facility]   2. I recognize that during the surgical operation/procedure, unforeseen conditions may necessitate additional or different procedures than those listed above. I, therefore, further authorize and request that the above-named surgeon, assistants, or designees perform such procedures as are, in their judgment, necessary and desirable. 3.   My surgeon/physician has discussed prior to my surgery the potential benefits, risks and side effects of this procedure; the likelihood of achieving goals; and potential problems that might occur during recuperation. They also discussed reasonable alternatives to the procedure, including risks, benefits, and side effects related to the alternatives and risks related to not receiving this procedure. I have had all my questions answered and I acknowledge that no guarantee has been made as to the result that may be obtained. 4.   Should the need arise during my operation or immediate post-operative period, I also consent to the administration of blood and/or blood products. Further, I understand that despite careful testing and screening of blood or blood products by collecting agencies, I may still be subject to ill effects as a result of receiving a blood transfusion and/or blood products.   The following are some, but not all, of the potential risks that can occur: fever and allergic reactions, hemolytic reactions, transmission of diseases such as Hepatitis, AIDS and Cytomegalovirus (CMV) and fluid overload. In the event that I wish to have an autologous transfusion of my own blood, or a directed donor transfusion. I will discuss this with my physician. 5.   I authorize the use of any specimen, organs, tissues, body parts or foreign objects that may be removed from my body during the operation/procedure for diagnosis, research or teaching purposes and their subsequent disposal by hospital authorities. I also authorize the release of specimen test results and/or written reports to my treating physician on the hospital medical staff or other referring or consulting physicians involved in my care, at the discretion of the Pathologist or my treating physician. 6.   I consent to the photographing or videotaping of the operations or procedures to be performed, including appropriate portions of my body for medical, scientific, or educational purposes, provided my identity is not revealed by the pictures or by descriptive texts accompanying them. If the procedure has been photographed/videotaped, the surgeon will obtain the original picture, image, videotape or CD. The hospital will not be responsible for storage, release or maintenance of the picture, image, tape or CD.    7.   I consent to the presence of a  or observers in the operating room as deemed necessary by my physician or their designees. 8.   I recognize that in the event my procedure results in extended X-Ray/fluoroscopy time, I may develop a skin reaction. 9. If I have a Do Not Attempt Resuscitation (DNAR) order in place, that status will be suspended while in the operating room, procedural suite, and during the recovery period unless otherwise explicitly stated by me (or a person authorized to consent on my behalf).  The surgeon or my attending physician will determine when the applicable recovery period ends for purposes of reinstating the DNAR order. 10. Patients having a sterilization procedure: I understand that if the procedure is successful the results will be permanent and it will therefore be impossible for me to inseminate, conceive, or bear children. I also understand that the procedure is intended to result in sterility, although the result has not been guaranteed. 11. I acknowledge that my physician has explained sedation/analgesia administration to me including the risk and benefits I consent to the administration of sedation/analgesia as may be necessary or desirable in the judgment of my physician. I CERTIFY THAT I HAVE READ AND FULLY UNDERSTAND THE ABOVE CONSENT TO OPERATION and/or OTHER PROCEDURE.    _________________________________________  __________________________________  Signature of Patient     Signature of Responsible Person         ___________________________________         Printed Name of Responsible Person           _________________________________                  Relationship to Patient  _________________________________________  ______________________________  Signature of Witness          Date  Time    STATEMENT OF PHYSICIAN My signature below affirms that prior to the time of the procedure; I have explained to the patient and/or his/her legal representative, the risks and benefits involved in the proposed treatment and any reasonable alternative to the proposed treatment. I have also explained the risks and benefits involved in refusal of the proposed treatment and alternatives to the proposed treatment and have answered the patient's questions. If I have a significant financial interest in a co-management agreement or a significant financial interest in any product or implant, or other significant relationship used in this procedure/surgery, I have disclosed this and had a discussion with my patient. _______________________________________________________________ _____________________________  Sanchez Smith Physician)                                                                                         (Date)                                   (Time)        Patient Name: Willem Domingo    : 3/30/1955   Printed: 3/21/2022      Medical Record #: [id number]                                              Page 1 of

## (undated) NOTE — Clinical Note
She will follow up with Dr. Nisha Mejia for the colon cancer and Dr. Rayne Wilde for the renal.  Thanks for the referral.

## (undated) NOTE — LETTER
[facility]  [address], [place], [place]    Authorization for Surgical Operation and Procedure                               I hereby authorize Osmany Slade MD, my physician and his/her assistants (if applicable), which may include medical students, residents, and/or fellows, to perform the following surgical operation/ procedure and administer such anesthesia as may be determined necessary by my physician: Operation/Procedure name (s) LAPAROSCOPIC CHOLECYSTECTOMY on [address] Smith   2.   I recognize that during the surgical operation/procedure, unforeseen conditions may necessitate additional or different procedures than those listed above.  I, therefore, further authorize and request that the above-named surgeon, assistants, or designees perform such procedures as are, in their judgment, necessary and desirable.    3.   My surgeon/physician has discussed prior to my surgery the potential benefits, risks and side effects of this procedure; the likelihood of achieving goals; and potential problems that might occur during recuperation.  They also discussed reasonable alternatives to the procedure, including risks, benefits, and side effects related to the alternatives and risks related to not receiving this procedure.  I have had all my questions answered and I acknowledge that no guarantee has been made as to the result that may be obtained.    4.   Should the need arise during my operation/procedure, which includes change of level of care prior to discharge, I also consent to the administration of blood and/or blood products.  Further, I understand that despite careful testing and screening of blood or blood products by collecting agencies, I may still be subject to ill effects as a result of receiving a blood transfusion and/or blood products.  The following are some, but not all, of the potential risks that can occur: fever and allergic reactions, hemolytic reactions,  transmission of diseases such as Hepatitis, AIDS and Cytomegalovirus (CMV) and fluid overload.  In the event that I wish to have an autologous transfusion of my own blood, or a directed donor transfusion, I will discuss this with my physician.  Check only if Refusing Blood or Blood Products  I understand refusal of blood or blood products as deemed necessary by my physician may have serious consequences to my condition to include possible death. I hereby assume responsibility for my refusal and release the hospital, its personnel, and my physicians from any responsibility for the consequences of my refusal.    o  Refuse   5.   I authorize the use of any specimen, organs, tissues, body parts or foreign objects that may be removed from my body during the operation/procedure for diagnosis, research or teaching purposes and their subsequent disposal by hospital authorities.  I also authorize the release of specimen test results and/or written reports to my treating physician on the hospital medical staff or other referring or consulting physicians involved in my care, at the discretion of the Pathologist or my treating physician.    6.   I consent to the photographing or videotaping of the operations or procedures to be performed, including appropriate portions of my body for medical, scientific, or educational purposes, provided my identity is not revealed by the pictures or by descriptive texts accompanying them.  If the procedure has been photographed/videotaped, the surgeon will obtain the original picture, image, videotape or CD.  The hospital will not be responsible for storage, release or maintenance of the picture, image, tape or CD.    7.   I consent to the presence of a  or observers in the operating room as deemed necessary by my physician or their designees.    8.   I recognize that in the event my procedure results in extended X-Ray/fluoroscopy time, I may develop a skin reaction.    9. If  I have a Do Not Attempt Resuscitation (DNAR) order in place, that status will be suspended while in the operating room, procedural suite, and during the recovery period unless otherwise explicitly stated by me (or a person authorized to consent on my behalf). The surgeon or my attending physician will determine when the applicable recovery period ends for purposes of reinstating the DNAR order.  10. Patients having a sterilization procedure: I understand that if the procedure is successful the results will be permanent and it will therefore be impossible for me to inseminate, conceive, or bear children.  I also understand that the procedure is intended to result in sterility, although the result has not been guaranteed.   11. I acknowledge that my physician has explained sedation/analgesia administration to me including the risk and benefits I consent to the administration of sedation/analgesia as may be necessary or desirable in the judgment of my physician.    I CERTIFY THAT I HAVE READ AND FULLY UNDERSTAND THE ABOVE CONSENT TO OPERATION and/or OTHER PROCEDURE.     ____________________________________  _________________________________        ______________________________  Signature of Patient    Signature of Responsible Person                Printed Name of Responsible Person                                      ____________________________________  _____________________________                ________________________________  Signature of Witness        Date  Time         Relationship to Patient    STATEMENT OF PHYSICIAN My signature below affirms that prior to the time of the procedure; I have explained to the patient and/or his/her legal representative, the risks and benefits involved in the proposed treatment and any reasonable alternative to the proposed treatment. I have also explained the risks and benefits involved in refusal of the proposed treatment and alternatives to the proposed treatment and have  answered the patient's questions. If I have a significant financial interest in a co-management agreement or a significant financial interest in any product or implant, or other significant relationship used in this procedure/surgery, I have disclosed this and had a discussion with my patient.     _____________________________________________________              _____________________________  (Signature of Physician)                                                                                         (Date)                                   (Time)  Patient Name: Emily Smith      : 3/30/1955      Printed: 3/6/2025     Medical Record #: [id number]                                      Page 1 of 1

## (undated) NOTE — LETTER
[facility], [place]  Authorization for Invasive Procedures  1.  I hereby authorize Dr. Anna Iglesias , my physician and whomever may be designated as the doctor's assistant, to perform the following operation and/or procedure occur: fever and allergic reactions, hemolytic reactions, transmission of disease such as hepatitis, AIDS, cytomegalovirus (CMV), and flluid overload.  In the event that I wish to have autologous transfusions of my own blood, or a directed donor transfusion Signature of Patient:  ________________________________________________ Date: _________Time: _________    Responsible person in case of minor or unconscious: _____________________________Relationship: ____________     Witness Signature: _______________

## (undated) NOTE — LETTER
[facility]  Administration of Anesthesia  1. I, Jeimy Fields, or _________________________________ acting on her behalf, (Patient) (Dependent/Representative) request to receive anesthesia for my pending procedure/operation/treatm spinal bleeding, seizure, cardiac arrest and death. 7. AWARENESS: I understand that it is possible (but unlikely) to have explicit memory of events from the operating room while under general anesthesia.   8. ELECTROCONVULSIVE THERAPY PATIENTS: This consen signature below affirms that prior to the time of the procedure, I have explained to the patient and/or his/her guardian, the risks and benefits of undergoing anesthesia, as well as any reasonable alternatives.     __________________________________________